# Patient Record
Sex: MALE | Race: WHITE | NOT HISPANIC OR LATINO | Employment: OTHER | URBAN - METROPOLITAN AREA
[De-identification: names, ages, dates, MRNs, and addresses within clinical notes are randomized per-mention and may not be internally consistent; named-entity substitution may affect disease eponyms.]

---

## 2021-06-25 ENCOUNTER — APPOINTMENT (OUTPATIENT)
Dept: GENERAL RADIOLOGY | Facility: HOSPITAL | Age: 46
End: 2021-06-25

## 2021-06-25 ENCOUNTER — APPOINTMENT (OUTPATIENT)
Dept: CT IMAGING | Facility: HOSPITAL | Age: 46
End: 2021-06-25

## 2021-06-25 ENCOUNTER — HOSPITAL ENCOUNTER (INPATIENT)
Facility: HOSPITAL | Age: 46
LOS: 6 days | Discharge: HOME | End: 2021-07-01
Attending: EMERGENCY MEDICINE | Admitting: INTERNAL MEDICINE

## 2021-06-25 DIAGNOSIS — D72.829 LEUKOCYTOSIS, UNSPECIFIED TYPE: ICD-10-CM

## 2021-06-25 DIAGNOSIS — N17.9 ACUTE KIDNEY INJURY (HCC): ICD-10-CM

## 2021-06-25 DIAGNOSIS — R41.82 ALTERED MENTAL STATUS, UNSPECIFIED ALTERED MENTAL STATUS TYPE: Primary | ICD-10-CM

## 2021-06-25 PROBLEM — R41.0 CONFUSION: Status: ACTIVE | Noted: 2021-06-25

## 2021-06-25 LAB
ALBUMIN SERPL-MCNC: 4.5 G/DL (ref 3.5–5.2)
ALBUMIN/GLOB SERPL: 1.5 G/DL
ALP SERPL-CCNC: 84 U/L (ref 39–117)
ALT SERPL W P-5'-P-CCNC: 21 U/L (ref 1–41)
AMPHET+METHAMPHET UR QL: NEGATIVE
ANION GAP SERPL CALCULATED.3IONS-SCNC: 22 MMOL/L (ref 5–15)
AST SERPL-CCNC: 18 U/L (ref 1–40)
BACTERIA UR QL AUTO: ABNORMAL /HPF
BARBITURATES UR QL SCN: NEGATIVE
BASOPHILS # BLD AUTO: 0.1 10*3/MM3 (ref 0–0.2)
BASOPHILS NFR BLD AUTO: 0.6 % (ref 0–1.5)
BENZODIAZ UR QL SCN: NEGATIVE
BILIRUB SERPL-MCNC: 0.5 MG/DL (ref 0–1.2)
BILIRUB UR QL STRIP: NEGATIVE
BUN SERPL-MCNC: 29 MG/DL (ref 6–20)
BUN/CREAT SERPL: 8.9 (ref 7–25)
CALCIUM SPEC-SCNC: 9.3 MG/DL (ref 8.6–10.5)
CANNABINOIDS SERPL QL: NEGATIVE
CHLORIDE SERPL-SCNC: 94 MMOL/L (ref 98–107)
CLARITY UR: CLEAR
CO2 SERPL-SCNC: 19 MMOL/L (ref 22–29)
COCAINE UR QL: NEGATIVE
COLOR UR: YELLOW
CREAT SERPL-MCNC: 3.26 MG/DL (ref 0.76–1.27)
DEPRECATED RDW RBC AUTO: 39.8 FL (ref 37–54)
EOSINOPHIL # BLD AUTO: 0 10*3/MM3 (ref 0–0.4)
EOSINOPHIL NFR BLD AUTO: 0.1 % (ref 0.3–6.2)
ERYTHROCYTE [DISTWIDTH] IN BLOOD BY AUTOMATED COUNT: 13.3 % (ref 12.3–15.4)
ETHANOL UR QL: <0.01 %
GFR SERPL CREATININE-BSD FRML MDRD: 21 ML/MIN/1.73
GLOBULIN UR ELPH-MCNC: 3.1 GM/DL
GLUCOSE SERPL-MCNC: 85 MG/DL (ref 65–99)
GLUCOSE UR STRIP-MCNC: NEGATIVE MG/DL
HCT VFR BLD AUTO: 44.4 % (ref 37.5–51)
HGB BLD-MCNC: 15.4 G/DL (ref 13–17.7)
HGB UR QL STRIP.AUTO: NEGATIVE
HYALINE CASTS UR QL AUTO: ABNORMAL /LPF
KETONES UR QL STRIP: ABNORMAL
LEUKOCYTE ESTERASE UR QL STRIP.AUTO: NEGATIVE
LYMPHOCYTES # BLD AUTO: 1.7 10*3/MM3 (ref 0.7–3.1)
LYMPHOCYTES NFR BLD AUTO: 8.4 % (ref 19.6–45.3)
MCH RBC QN AUTO: 29.8 PG (ref 26.6–33)
MCHC RBC AUTO-ENTMCNC: 34.6 G/DL (ref 31.5–35.7)
MCV RBC AUTO: 86.1 FL (ref 79–97)
METHADONE UR QL SCN: NEGATIVE
MONOCYTES # BLD AUTO: 1.5 10*3/MM3 (ref 0.1–0.9)
MONOCYTES NFR BLD AUTO: 7.3 % (ref 5–12)
MUCOUS THREADS URNS QL MICRO: ABNORMAL /HPF
NEUTROPHILS NFR BLD AUTO: 16.8 10*3/MM3 (ref 1.7–7)
NEUTROPHILS NFR BLD AUTO: 83.6 % (ref 42.7–76)
NITRITE UR QL STRIP: NEGATIVE
NRBC BLD AUTO-RTO: 0 /100 WBC (ref 0–0.2)
OPIATES UR QL: POSITIVE
OXYCODONE UR QL SCN: NEGATIVE
PH UR STRIP.AUTO: 5.5 [PH] (ref 5–8)
PLATELET # BLD AUTO: 261 10*3/MM3 (ref 140–450)
PMV BLD AUTO: 8.6 FL (ref 6–12)
POTASSIUM SERPL-SCNC: 3.9 MMOL/L (ref 3.5–5.2)
PROT SERPL-MCNC: 7.6 G/DL (ref 6–8.5)
PROT UR QL STRIP: ABNORMAL
RBC # BLD AUTO: 5.16 10*6/MM3 (ref 4.14–5.8)
RBC # UR: ABNORMAL /HPF
REF LAB TEST METHOD: ABNORMAL
SODIUM SERPL-SCNC: 135 MMOL/L (ref 136–145)
SP GR UR STRIP: 1.02 (ref 1–1.03)
SQUAMOUS #/AREA URNS HPF: ABNORMAL /HPF
TROPONIN T SERPL-MCNC: <0.01 NG/ML (ref 0–0.03)
UROBILINOGEN UR QL STRIP: ABNORMAL
WBC # BLD AUTO: 20.1 10*3/MM3 (ref 3.4–10.8)
WBC UR QL AUTO: ABNORMAL /HPF

## 2021-06-25 PROCEDURE — 80307 DRUG TEST PRSMV CHEM ANLYZR: CPT | Performed by: EMERGENCY MEDICINE

## 2021-06-25 PROCEDURE — 82077 ASSAY SPEC XCP UR&BREATH IA: CPT | Performed by: EMERGENCY MEDICINE

## 2021-06-25 PROCEDURE — 99285 EMERGENCY DEPT VISIT HI MDM: CPT

## 2021-06-25 PROCEDURE — 81001 URINALYSIS AUTO W/SCOPE: CPT | Performed by: EMERGENCY MEDICINE

## 2021-06-25 PROCEDURE — 99223 1ST HOSP IP/OBS HIGH 75: CPT | Performed by: INTERNAL MEDICINE

## 2021-06-25 PROCEDURE — 87086 URINE CULTURE/COLONY COUNT: CPT | Performed by: EMERGENCY MEDICINE

## 2021-06-25 PROCEDURE — 93005 ELECTROCARDIOGRAM TRACING: CPT | Performed by: EMERGENCY MEDICINE

## 2021-06-25 PROCEDURE — 71045 X-RAY EXAM CHEST 1 VIEW: CPT

## 2021-06-25 PROCEDURE — 85025 COMPLETE CBC W/AUTO DIFF WBC: CPT | Performed by: EMERGENCY MEDICINE

## 2021-06-25 PROCEDURE — 80053 COMPREHEN METABOLIC PANEL: CPT | Performed by: EMERGENCY MEDICINE

## 2021-06-25 PROCEDURE — 70450 CT HEAD/BRAIN W/O DYE: CPT

## 2021-06-25 PROCEDURE — 84484 ASSAY OF TROPONIN QUANT: CPT | Performed by: EMERGENCY MEDICINE

## 2021-06-25 RX ORDER — SODIUM CHLORIDE 0.9 % (FLUSH) 0.9 %
3-10 SYRINGE (ML) INJECTION AS NEEDED
Status: DISCONTINUED | OUTPATIENT
Start: 2021-06-25 | End: 2021-07-01 | Stop reason: HOSPADM

## 2021-06-25 RX ORDER — MORPHINE SULFATE 60 MG/1
30 TABLET, FILM COATED, EXTENDED RELEASE ORAL 3 TIMES DAILY
COMMUNITY
End: 2022-05-13

## 2021-06-25 RX ORDER — NORTRIPTYLINE HYDROCHLORIDE 25 MG/1
25 CAPSULE ORAL NIGHTLY
COMMUNITY
End: 2022-03-07

## 2021-06-25 RX ORDER — BISACODYL 5 MG/1
5 TABLET, DELAYED RELEASE ORAL DAILY PRN
Status: DISCONTINUED | OUTPATIENT
Start: 2021-06-25 | End: 2021-07-01 | Stop reason: HOSPADM

## 2021-06-25 RX ORDER — AMOXICILLIN 250 MG
2 CAPSULE ORAL 2 TIMES DAILY
Status: DISCONTINUED | OUTPATIENT
Start: 2021-06-25 | End: 2021-07-01 | Stop reason: HOSPADM

## 2021-06-25 RX ORDER — RENAGEL 800 MG/1
14 TABLET ORAL DAILY
COMMUNITY
End: 2022-03-07

## 2021-06-25 RX ORDER — POLYETHYLENE GLYCOL 3350 17 G/17G
17 POWDER, FOR SOLUTION ORAL DAILY PRN
Status: DISCONTINUED | OUTPATIENT
Start: 2021-06-25 | End: 2021-07-01 | Stop reason: HOSPADM

## 2021-06-25 RX ORDER — RIZATRIPTAN BENZOATE 10 MG/1
10 TABLET ORAL ONCE AS NEEDED
COMMUNITY

## 2021-06-25 RX ORDER — SODIUM CHLORIDE 0.9 % (FLUSH) 0.9 %
10 SYRINGE (ML) INJECTION AS NEEDED
Status: DISCONTINUED | OUTPATIENT
Start: 2021-06-25 | End: 2021-07-01 | Stop reason: HOSPADM

## 2021-06-25 RX ORDER — SODIUM CHLORIDE 9 MG/ML
125 INJECTION, SOLUTION INTRAVENOUS CONTINUOUS
Status: DISCONTINUED | OUTPATIENT
Start: 2021-06-25 | End: 2021-06-29

## 2021-06-25 RX ORDER — MELATONIN
2000 DAILY
COMMUNITY
End: 2021-06-26

## 2021-06-25 RX ORDER — PHENOL 1.4 %
600 AEROSOL, SPRAY (ML) MUCOUS MEMBRANE DAILY
COMMUNITY
End: 2021-06-26

## 2021-06-25 RX ORDER — BACLOFEN 20 MG/1
20 TABLET ORAL 3 TIMES DAILY
COMMUNITY
End: 2021-07-01 | Stop reason: HOSPADM

## 2021-06-25 RX ORDER — LISINOPRIL 10 MG/1
10 TABLET ORAL DAILY
COMMUNITY
End: 2021-07-01 | Stop reason: HOSPADM

## 2021-06-25 RX ORDER — AMLODIPINE BESYLATE 10 MG/1
10 TABLET ORAL DAILY
COMMUNITY

## 2021-06-25 RX ORDER — SODIUM CHLORIDE 0.9 % (FLUSH) 0.9 %
3 SYRINGE (ML) INJECTION EVERY 12 HOURS SCHEDULED
Status: DISCONTINUED | OUTPATIENT
Start: 2021-06-25 | End: 2021-07-01 | Stop reason: HOSPADM

## 2021-06-25 RX ORDER — MORPHINE SULFATE 15 MG/1
15 TABLET ORAL EVERY 6 HOURS PRN
COMMUNITY
End: 2022-05-13 | Stop reason: SDUPTHER

## 2021-06-25 RX ORDER — ONDANSETRON 2 MG/ML
4 INJECTION INTRAMUSCULAR; INTRAVENOUS EVERY 6 HOURS PRN
Status: DISCONTINUED | OUTPATIENT
Start: 2021-06-25 | End: 2021-06-28

## 2021-06-25 RX ORDER — BISACODYL 10 MG
10 SUPPOSITORY, RECTAL RECTAL DAILY PRN
Status: DISCONTINUED | OUTPATIENT
Start: 2021-06-25 | End: 2021-07-01 | Stop reason: HOSPADM

## 2021-06-25 RX ORDER — BETHANECHOL CHLORIDE 25 MG/1
25 TABLET ORAL 3 TIMES DAILY
COMMUNITY
End: 2023-01-02 | Stop reason: SDDI

## 2021-06-25 RX ORDER — ONDANSETRON 4 MG/1
4 TABLET, FILM COATED ORAL EVERY 6 HOURS PRN
Status: DISCONTINUED | OUTPATIENT
Start: 2021-06-25 | End: 2021-07-01 | Stop reason: HOSPADM

## 2021-06-25 RX ORDER — MECLIZINE HCL 12.5 MG/1
12.5 TABLET ORAL 3 TIMES DAILY PRN
COMMUNITY

## 2021-06-25 RX ORDER — PREGABALIN 225 MG/1
225 CAPSULE ORAL 2 TIMES DAILY
COMMUNITY
End: 2021-06-26

## 2021-06-25 RX ORDER — CYCLOBENZAPRINE HCL 10 MG
10 TABLET ORAL 3 TIMES DAILY PRN
COMMUNITY
End: 2021-06-26

## 2021-06-25 RX ORDER — ALUMINA, MAGNESIA, AND SIMETHICONE 2400; 2400; 240 MG/30ML; MG/30ML; MG/30ML
15 SUSPENSION ORAL EVERY 6 HOURS PRN
Status: DISCONTINUED | OUTPATIENT
Start: 2021-06-25 | End: 2021-07-01 | Stop reason: HOSPADM

## 2021-06-25 RX ADMIN — SODIUM CHLORIDE 1000 ML: 9 INJECTION, SOLUTION INTRAVENOUS at 22:22

## 2021-06-26 ENCOUNTER — APPOINTMENT (OUTPATIENT)
Dept: GENERAL RADIOLOGY | Facility: HOSPITAL | Age: 46
End: 2021-06-26

## 2021-06-26 ENCOUNTER — APPOINTMENT (OUTPATIENT)
Dept: ULTRASOUND IMAGING | Facility: HOSPITAL | Age: 46
End: 2021-06-26

## 2021-06-26 PROBLEM — R65.10 SIRS (SYSTEMIC INFLAMMATORY RESPONSE SYNDROME) (HCC): Status: ACTIVE | Noted: 2021-06-26

## 2021-06-26 PROBLEM — G93.41 ACUTE METABOLIC ENCEPHALOPATHY: Status: ACTIVE | Noted: 2021-06-26

## 2021-06-26 PROBLEM — F32.A DEPRESSION: Status: ACTIVE | Noted: 2021-06-26

## 2021-06-26 PROBLEM — G89.29 CHRONIC PAIN: Status: ACTIVE | Noted: 2021-06-26

## 2021-06-26 PROBLEM — N17.9 AKI (ACUTE KIDNEY INJURY): Status: ACTIVE | Noted: 2021-06-26

## 2021-06-26 LAB
AMMONIA BLD-SCNC: 21 UMOL/L (ref 16–60)
ANION GAP SERPL CALCULATED.3IONS-SCNC: 19 MMOL/L (ref 5–15)
BUN SERPL-MCNC: 28 MG/DL (ref 6–20)
BUN/CREAT SERPL: 10.9 (ref 7–25)
CALCIUM SPEC-SCNC: 8.8 MG/DL (ref 8.6–10.5)
CHLORIDE SERPL-SCNC: 95 MMOL/L (ref 98–107)
CK SERPL-CCNC: 379 U/L (ref 20–200)
CO2 SERPL-SCNC: 21 MMOL/L (ref 22–29)
CREAT SERPL-MCNC: 2.58 MG/DL (ref 0.76–1.27)
CRP SERPL-MCNC: 1.92 MG/DL (ref 0–0.5)
D-LACTATE SERPL-SCNC: 2 MMOL/L (ref 0.5–2)
D-LACTATE SERPL-SCNC: 3.4 MMOL/L (ref 0.5–2)
DEPRECATED RDW RBC AUTO: 40.7 FL (ref 37–54)
EOSINOPHIL # BLD MANUAL: 0.18 10*3/MM3 (ref 0–0.4)
EOSINOPHIL NFR BLD MANUAL: 1 % (ref 0.3–6.2)
ERYTHROCYTE [DISTWIDTH] IN BLOOD BY AUTOMATED COUNT: 13.4 % (ref 12.3–15.4)
ERYTHROCYTE [SEDIMENTATION RATE] IN BLOOD: 25 MM/HR (ref 0–15)
GFR SERPL CREATININE-BSD FRML MDRD: 27 ML/MIN/1.73
GLUCOSE SERPL-MCNC: 99 MG/DL (ref 65–99)
HCT VFR BLD AUTO: 43.3 % (ref 37.5–51)
HGB BLD-MCNC: 14.6 G/DL (ref 13–17.7)
LYMPHOCYTES # BLD MANUAL: 1.1 10*3/MM3 (ref 0.7–3.1)
LYMPHOCYTES NFR BLD MANUAL: 3 % (ref 5–12)
LYMPHOCYTES NFR BLD MANUAL: 6 % (ref 19.6–45.3)
MCH RBC QN AUTO: 29.1 PG (ref 26.6–33)
MCHC RBC AUTO-ENTMCNC: 33.7 G/DL (ref 31.5–35.7)
MCV RBC AUTO: 86.4 FL (ref 79–97)
MONOCYTES # BLD AUTO: 0.55 10*3/MM3 (ref 0.1–0.9)
NEUTROPHILS # BLD AUTO: 16.56 10*3/MM3 (ref 1.7–7)
NEUTROPHILS NFR BLD MANUAL: 83 % (ref 42.7–76)
NEUTS BAND NFR BLD MANUAL: 7 % (ref 0–5)
NEUTS VAC BLD QL SMEAR: ABNORMAL
PLAT MORPH BLD: NORMAL
PLATELET # BLD AUTO: 258 10*3/MM3 (ref 140–450)
PMV BLD AUTO: 8.7 FL (ref 6–12)
POTASSIUM SERPL-SCNC: 3.4 MMOL/L (ref 3.5–5.2)
QT INTERVAL: 296 MS
RBC # BLD AUTO: 5.01 10*6/MM3 (ref 4.14–5.8)
RBC MORPH BLD: NORMAL
SARS-COV-2 RNA PNL SPEC NAA+PROBE: NOT DETECTED
SODIUM SERPL-SCNC: 135 MMOL/L (ref 136–145)
URATE SERPL-MCNC: 9.2 MG/DL (ref 3.4–7)
VIT B12 BLD-MCNC: 413 PG/ML (ref 211–946)
WBC # BLD AUTO: 18.4 10*3/MM3 (ref 3.4–10.8)

## 2021-06-26 PROCEDURE — 82140 ASSAY OF AMMONIA: CPT | Performed by: HOSPITALIST

## 2021-06-26 PROCEDURE — 80048 BASIC METABOLIC PNL TOTAL CA: CPT | Performed by: INTERNAL MEDICINE

## 2021-06-26 PROCEDURE — 99222 1ST HOSP IP/OBS MODERATE 55: CPT | Performed by: PSYCHIATRY & NEUROLOGY

## 2021-06-26 PROCEDURE — 25010000002 ONDANSETRON PER 1 MG: Performed by: INTERNAL MEDICINE

## 2021-06-26 PROCEDURE — 85652 RBC SED RATE AUTOMATED: CPT | Performed by: HOSPITALIST

## 2021-06-26 PROCEDURE — 99232 SBSQ HOSP IP/OBS MODERATE 35: CPT | Performed by: HOSPITALIST

## 2021-06-26 PROCEDURE — 87635 SARS-COV-2 COVID-19 AMP PRB: CPT | Performed by: EMERGENCY MEDICINE

## 2021-06-26 PROCEDURE — 85007 BL SMEAR W/DIFF WBC COUNT: CPT | Performed by: INTERNAL MEDICINE

## 2021-06-26 PROCEDURE — 84550 ASSAY OF BLOOD/URIC ACID: CPT | Performed by: HOSPITALIST

## 2021-06-26 PROCEDURE — 82550 ASSAY OF CK (CPK): CPT | Performed by: HOSPITALIST

## 2021-06-26 PROCEDURE — 86140 C-REACTIVE PROTEIN: CPT | Performed by: HOSPITALIST

## 2021-06-26 PROCEDURE — G0378 HOSPITAL OBSERVATION PER HR: HCPCS

## 2021-06-26 PROCEDURE — 85027 COMPLETE CBC AUTOMATED: CPT | Performed by: INTERNAL MEDICINE

## 2021-06-26 PROCEDURE — 25010000002 CEFTRIAXONE PER 250 MG: Performed by: HOSPITALIST

## 2021-06-26 PROCEDURE — 76775 US EXAM ABDO BACK WALL LIM: CPT

## 2021-06-26 PROCEDURE — 82607 VITAMIN B-12: CPT | Performed by: HOSPITALIST

## 2021-06-26 PROCEDURE — 83605 ASSAY OF LACTIC ACID: CPT | Performed by: HOSPITALIST

## 2021-06-26 PROCEDURE — 87040 BLOOD CULTURE FOR BACTERIA: CPT | Performed by: HOSPITALIST

## 2021-06-26 PROCEDURE — 36415 COLL VENOUS BLD VENIPUNCTURE: CPT | Performed by: INTERNAL MEDICINE

## 2021-06-26 PROCEDURE — 74018 RADEX ABDOMEN 1 VIEW: CPT

## 2021-06-26 RX ORDER — AMLODIPINE BESYLATE 5 MG/1
10 TABLET ORAL DAILY
Status: DISCONTINUED | OUTPATIENT
Start: 2021-06-26 | End: 2021-07-01 | Stop reason: HOSPADM

## 2021-06-26 RX ORDER — PREGABALIN 150 MG/1
150 CAPSULE ORAL 3 TIMES DAILY
COMMUNITY
End: 2022-05-13 | Stop reason: SDUPTHER

## 2021-06-26 RX ORDER — SUMATRIPTAN 25 MG/1
25 TABLET, FILM COATED ORAL
Status: COMPLETED | OUTPATIENT
Start: 2021-06-26 | End: 2021-06-30

## 2021-06-26 RX ORDER — LORAZEPAM 2 MG/ML
INJECTION INTRAMUSCULAR
Status: COMPLETED
Start: 2021-06-26 | End: 2021-06-27

## 2021-06-26 RX ORDER — NORTRIPTYLINE HYDROCHLORIDE 25 MG/1
25 CAPSULE ORAL NIGHTLY
Status: DISCONTINUED | OUTPATIENT
Start: 2021-06-26 | End: 2021-07-01 | Stop reason: HOSPADM

## 2021-06-26 RX ORDER — PREGABALIN 100 MG/1
100 CAPSULE ORAL EVERY 8 HOURS SCHEDULED
Status: DISCONTINUED | OUTPATIENT
Start: 2021-06-26 | End: 2021-07-01 | Stop reason: HOSPADM

## 2021-06-26 RX ORDER — MORPHINE SULFATE 15 MG/1
15 TABLET ORAL EVERY 6 HOURS PRN
Status: DISCONTINUED | OUTPATIENT
Start: 2021-06-26 | End: 2021-07-01 | Stop reason: HOSPADM

## 2021-06-26 RX ORDER — LORAZEPAM 2 MG/ML
0.5 INJECTION INTRAMUSCULAR ONCE
Status: COMPLETED | OUTPATIENT
Start: 2021-06-27 | End: 2021-06-27

## 2021-06-26 RX ORDER — CHOLECALCIFEROL (VITAMIN D3) 125 MCG
5 CAPSULE ORAL NIGHTLY PRN
Status: DISCONTINUED | OUTPATIENT
Start: 2021-06-26 | End: 2021-07-01 | Stop reason: HOSPADM

## 2021-06-26 RX ORDER — MORPHINE SULFATE 15 MG/1
60 TABLET, FILM COATED, EXTENDED RELEASE ORAL 2 TIMES DAILY
Status: DISCONTINUED | OUTPATIENT
Start: 2021-06-26 | End: 2021-07-01 | Stop reason: HOSPADM

## 2021-06-26 RX ORDER — SUMATRIPTAN 50 MG/1
25 TABLET, FILM COATED ORAL ONCE AS NEEDED
Status: COMPLETED | OUTPATIENT
Start: 2021-06-26 | End: 2021-06-26

## 2021-06-26 RX ORDER — MECLIZINE HYDROCHLORIDE 25 MG/1
12.5 TABLET ORAL 3 TIMES DAILY PRN
Status: DISCONTINUED | OUTPATIENT
Start: 2021-06-26 | End: 2021-07-01 | Stop reason: HOSPADM

## 2021-06-26 RX ADMIN — MORPHINE SULFATE 60 MG: 15 TABLET, FILM COATED, EXTENDED RELEASE ORAL at 02:59

## 2021-06-26 RX ADMIN — PREGABALIN 100 MG: 100 CAPSULE ORAL at 21:00

## 2021-06-26 RX ADMIN — SODIUM CHLORIDE 125 ML/HR: 9 INJECTION, SOLUTION INTRAVENOUS at 20:36

## 2021-06-26 RX ADMIN — PREGABALIN 100 MG: 100 CAPSULE ORAL at 08:38

## 2021-06-26 RX ADMIN — DOCUSATE SODIUM 50 MG AND SENNOSIDES 8.6 MG 2 TABLET: 8.6; 5 TABLET, FILM COATED ORAL at 20:34

## 2021-06-26 RX ADMIN — MORPHINE SULFATE 15 MG: 15 TABLET ORAL at 08:07

## 2021-06-26 RX ADMIN — MORPHINE SULFATE 60 MG: 15 TABLET, FILM COATED, EXTENDED RELEASE ORAL at 15:25

## 2021-06-26 RX ADMIN — NORTRIPTYLINE HYDROCHLORIDE 25 MG: 25 CAPSULE ORAL at 06:52

## 2021-06-26 RX ADMIN — Medication 5 MG: at 21:00

## 2021-06-26 RX ADMIN — AMLODIPINE BESYLATE 10 MG: 5 TABLET ORAL at 08:07

## 2021-06-26 RX ADMIN — BISACODYL 5 MG: 5 TABLET, COATED ORAL at 02:22

## 2021-06-26 RX ADMIN — Medication 3 ML: at 20:37

## 2021-06-26 RX ADMIN — DOCUSATE SODIUM 50 MG AND SENNOSIDES 8.6 MG 2 TABLET: 8.6; 5 TABLET, FILM COATED ORAL at 11:17

## 2021-06-26 RX ADMIN — ONDANSETRON 4 MG: 2 INJECTION INTRAMUSCULAR; INTRAVENOUS at 02:24

## 2021-06-26 RX ADMIN — SUMATRIPTAN SUCCINATE 25 MG: 50 TABLET ORAL at 05:48

## 2021-06-26 RX ADMIN — NORTRIPTYLINE HYDROCHLORIDE 25 MG: 25 CAPSULE ORAL at 20:37

## 2021-06-26 RX ADMIN — PREGABALIN 100 MG: 100 CAPSULE ORAL at 15:25

## 2021-06-26 RX ADMIN — SODIUM CHLORIDE 125 ML/HR: 9 INJECTION, SOLUTION INTRAVENOUS at 20:34

## 2021-06-26 RX ADMIN — SODIUM CHLORIDE 125 ML/HR: 9 INJECTION, SOLUTION INTRAVENOUS at 02:25

## 2021-06-26 RX ADMIN — WATER 1 G: 1000 INJECTION, SOLUTION INTRAVENOUS at 08:07

## 2021-06-26 RX ADMIN — MECLIZINE HYDROCHLORIDE 12.5 MG: 25 TABLET ORAL at 20:34

## 2021-06-27 PROBLEM — R41.82 ALTERED MENTAL STATUS: Status: ACTIVE | Noted: 2021-06-27

## 2021-06-27 LAB
ALBUMIN SERPL-MCNC: 3.9 G/DL (ref 3.5–5.2)
ALBUMIN/GLOB SERPL: 1.6 G/DL
ALP SERPL-CCNC: 63 U/L (ref 39–117)
ALT SERPL W P-5'-P-CCNC: 15 U/L (ref 1–41)
ANION GAP SERPL CALCULATED.3IONS-SCNC: 15 MMOL/L (ref 5–15)
AST SERPL-CCNC: 20 U/L (ref 1–40)
BACTERIA SPEC AEROBE CULT: NO GROWTH
BASOPHILS # BLD AUTO: 0.1 10*3/MM3 (ref 0–0.2)
BASOPHILS NFR BLD AUTO: 0.5 % (ref 0–1.5)
BILIRUB SERPL-MCNC: 0.4 MG/DL (ref 0–1.2)
BUN SERPL-MCNC: 24 MG/DL (ref 6–20)
BUN/CREAT SERPL: 13 (ref 7–25)
CALCIUM SPEC-SCNC: 7.9 MG/DL (ref 8.6–10.5)
CHLORIDE SERPL-SCNC: 98 MMOL/L (ref 98–107)
CO2 SERPL-SCNC: 21 MMOL/L (ref 22–29)
CREAT SERPL-MCNC: 1.85 MG/DL (ref 0.76–1.27)
CRP SERPL-MCNC: 1.99 MG/DL (ref 0–0.5)
D-LACTATE SERPL-SCNC: 3.5 MMOL/L (ref 0.5–2)
DEPRECATED RDW RBC AUTO: 40.7 FL (ref 37–54)
EOSINOPHIL # BLD AUTO: 0.1 10*3/MM3 (ref 0–0.4)
EOSINOPHIL NFR BLD AUTO: 0.6 % (ref 0.3–6.2)
ERYTHROCYTE [DISTWIDTH] IN BLOOD BY AUTOMATED COUNT: 13.4 % (ref 12.3–15.4)
ERYTHROCYTE [SEDIMENTATION RATE] IN BLOOD: 18 MM/HR (ref 0–15)
GFR SERPL CREATININE-BSD FRML MDRD: 40 ML/MIN/1.73
GLOBULIN UR ELPH-MCNC: 2.5 GM/DL
GLUCOSE BLDC GLUCOMTR-MCNC: 95 MG/DL (ref 70–105)
GLUCOSE SERPL-MCNC: 98 MG/DL (ref 65–99)
HCT VFR BLD AUTO: 37.1 % (ref 37.5–51)
HGB BLD-MCNC: 12.4 G/DL (ref 13–17.7)
LYMPHOCYTES # BLD AUTO: 1.8 10*3/MM3 (ref 0.7–3.1)
LYMPHOCYTES NFR BLD AUTO: 11.5 % (ref 19.6–45.3)
MAGNESIUM SERPL-MCNC: 1.7 MG/DL (ref 1.6–2.6)
MCH RBC QN AUTO: 29.1 PG (ref 26.6–33)
MCHC RBC AUTO-ENTMCNC: 33.5 G/DL (ref 31.5–35.7)
MCV RBC AUTO: 86.9 FL (ref 79–97)
MONOCYTES # BLD AUTO: 1.7 10*3/MM3 (ref 0.1–0.9)
MONOCYTES NFR BLD AUTO: 10.9 % (ref 5–12)
NEUTROPHILS NFR BLD AUTO: 12.2 10*3/MM3 (ref 1.7–7)
NEUTROPHILS NFR BLD AUTO: 76.5 % (ref 42.7–76)
NRBC BLD AUTO-RTO: 0.1 /100 WBC (ref 0–0.2)
PLATELET # BLD AUTO: 199 10*3/MM3 (ref 140–450)
PMV BLD AUTO: 9.3 FL (ref 6–12)
POTASSIUM SERPL-SCNC: 3.6 MMOL/L (ref 3.5–5.2)
PROCALCITONIN SERPL-MCNC: 0.38 NG/ML (ref 0–0.25)
PROT SERPL-MCNC: 6.4 G/DL (ref 6–8.5)
RBC # BLD AUTO: 4.27 10*6/MM3 (ref 4.14–5.8)
SODIUM SERPL-SCNC: 134 MMOL/L (ref 136–145)
WBC # BLD AUTO: 15.9 10*3/MM3 (ref 3.4–10.8)

## 2021-06-27 PROCEDURE — 25010000002 VANCOMYCIN PER 500 MG: Performed by: HOSPITALIST

## 2021-06-27 PROCEDURE — 82962 GLUCOSE BLOOD TEST: CPT

## 2021-06-27 PROCEDURE — 80053 COMPREHEN METABOLIC PANEL: CPT | Performed by: INTERNAL MEDICINE

## 2021-06-27 PROCEDURE — 25010000002 HALOPERIDOL LACTATE PER 5 MG: Performed by: INTERNAL MEDICINE

## 2021-06-27 PROCEDURE — 99232 SBSQ HOSP IP/OBS MODERATE 35: CPT | Performed by: HOSPITALIST

## 2021-06-27 PROCEDURE — 94799 UNLISTED PULMONARY SVC/PX: CPT

## 2021-06-27 PROCEDURE — 83735 ASSAY OF MAGNESIUM: CPT | Performed by: HOSPITALIST

## 2021-06-27 PROCEDURE — 25010000002 VANCOMYCIN 10 G RECONSTITUTED SOLUTION: Performed by: HOSPITALIST

## 2021-06-27 PROCEDURE — 84145 PROCALCITONIN (PCT): CPT | Performed by: HOSPITALIST

## 2021-06-27 PROCEDURE — 85652 RBC SED RATE AUTOMATED: CPT | Performed by: INTERNAL MEDICINE

## 2021-06-27 PROCEDURE — 85025 COMPLETE CBC W/AUTO DIFF WBC: CPT | Performed by: HOSPITALIST

## 2021-06-27 PROCEDURE — 99232 SBSQ HOSP IP/OBS MODERATE 35: CPT | Performed by: PSYCHIATRY & NEUROLOGY

## 2021-06-27 PROCEDURE — 25010000002 CEFTRIAXONE PER 250 MG: Performed by: HOSPITALIST

## 2021-06-27 PROCEDURE — 99222 1ST HOSP IP/OBS MODERATE 55: CPT | Performed by: PSYCHIATRY & NEUROLOGY

## 2021-06-27 PROCEDURE — 25010000002 LORAZEPAM PER 2 MG: Performed by: HOSPITALIST

## 2021-06-27 PROCEDURE — 83605 ASSAY OF LACTIC ACID: CPT | Performed by: INTERNAL MEDICINE

## 2021-06-27 PROCEDURE — 25010000002 LORAZEPAM PER 2 MG

## 2021-06-27 PROCEDURE — 86140 C-REACTIVE PROTEIN: CPT | Performed by: HOSPITALIST

## 2021-06-27 PROCEDURE — 25010000002 LORAZEPAM PER 2 MG: Performed by: INTERNAL MEDICINE

## 2021-06-27 RX ORDER — LORAZEPAM 2 MG/ML
1 INJECTION INTRAMUSCULAR EVERY 4 HOURS PRN
Status: DISCONTINUED | OUTPATIENT
Start: 2021-06-27 | End: 2021-06-28

## 2021-06-27 RX ORDER — LORAZEPAM 2 MG/ML
2 INJECTION INTRAMUSCULAR
Status: DISCONTINUED | OUTPATIENT
Start: 2021-06-27 | End: 2021-06-27

## 2021-06-27 RX ORDER — LORAZEPAM 2 MG/ML
2 INJECTION INTRAMUSCULAR ONCE
Status: COMPLETED | OUTPATIENT
Start: 2021-06-27 | End: 2021-06-27

## 2021-06-27 RX ORDER — LORAZEPAM 2 MG/ML
1 INJECTION INTRAMUSCULAR ONCE
Status: COMPLETED | OUTPATIENT
Start: 2021-06-27 | End: 2021-06-27

## 2021-06-27 RX ORDER — VANCOMYCIN 1.75 GRAM/500 ML IN 0.9 % SODIUM CHLORIDE INTRAVENOUS
20 ONCE
Status: COMPLETED | OUTPATIENT
Start: 2021-06-27 | End: 2021-06-27

## 2021-06-27 RX ORDER — HALOPERIDOL 5 MG/ML
5 INJECTION INTRAMUSCULAR ONCE
Status: COMPLETED | OUTPATIENT
Start: 2021-06-27 | End: 2021-06-27

## 2021-06-27 RX ORDER — OLANZAPINE 10 MG/2ML
5 INJECTION, POWDER, LYOPHILIZED, FOR SOLUTION INTRAMUSCULAR 2 TIMES DAILY PRN
Status: DISCONTINUED | OUTPATIENT
Start: 2021-06-27 | End: 2021-06-28

## 2021-06-27 RX ORDER — LORAZEPAM 2 MG/ML
INJECTION INTRAMUSCULAR
Status: COMPLETED
Start: 2021-06-27 | End: 2021-06-27

## 2021-06-27 RX ORDER — LORAZEPAM 2 MG/ML
INJECTION INTRAMUSCULAR
Status: DISPENSED
Start: 2021-06-27 | End: 2021-06-27

## 2021-06-27 RX ADMIN — WATER 1 G: 1000 INJECTION, SOLUTION INTRAVENOUS at 12:24

## 2021-06-27 RX ADMIN — LORAZEPAM 2 MG: 2 INJECTION INTRAMUSCULAR; INTRAVENOUS at 01:29

## 2021-06-27 RX ADMIN — LORAZEPAM 0.5 MG: 2 INJECTION INTRAMUSCULAR at 00:07

## 2021-06-27 RX ADMIN — PREGABALIN 100 MG: 100 CAPSULE ORAL at 14:13

## 2021-06-27 RX ADMIN — HALOPERIDOL LACTATE 5 MG: 5 INJECTION INTRAMUSCULAR at 00:30

## 2021-06-27 RX ADMIN — WATER 1 G: 1000 INJECTION, SOLUTION INTRAVENOUS at 08:35

## 2021-06-27 RX ADMIN — LORAZEPAM 2 MG: 2 INJECTION INTRAMUSCULAR; INTRAVENOUS at 02:41

## 2021-06-27 RX ADMIN — WATER 5 MG: 1 INJECTION INTRAMUSCULAR; INTRAVENOUS; SUBCUTANEOUS at 14:07

## 2021-06-27 RX ADMIN — LORAZEPAM 2 MG: 2 INJECTION INTRAMUSCULAR; INTRAVENOUS at 12:24

## 2021-06-27 RX ADMIN — VANCOMYCIN HYDROCHLORIDE 1750 MG: 500 INJECTION, POWDER, LYOPHILIZED, FOR SOLUTION INTRAVENOUS at 17:46

## 2021-06-27 RX ADMIN — NORTRIPTYLINE HYDROCHLORIDE 25 MG: 25 CAPSULE ORAL at 21:34

## 2021-06-27 RX ADMIN — CEFTRIAXONE SODIUM 2 G: 2 INJECTION, POWDER, FOR SOLUTION INTRAMUSCULAR; INTRAVENOUS at 20:16

## 2021-06-27 RX ADMIN — SODIUM CHLORIDE 125 ML/HR: 9 INJECTION, SOLUTION INTRAVENOUS at 14:11

## 2021-06-27 RX ADMIN — LORAZEPAM 0.5 MG: 2 INJECTION INTRAMUSCULAR; INTRAVENOUS at 00:07

## 2021-06-27 RX ADMIN — Medication 3 ML: at 08:58

## 2021-06-27 RX ADMIN — MORPHINE SULFATE 60 MG: 15 TABLET, FILM COATED, EXTENDED RELEASE ORAL at 14:13

## 2021-06-27 RX ADMIN — LORAZEPAM 2 MG: 2 INJECTION INTRAMUSCULAR at 01:29

## 2021-06-27 RX ADMIN — LORAZEPAM 1 MG: 2 INJECTION INTRAMUSCULAR; INTRAVENOUS at 17:56

## 2021-06-27 RX ADMIN — SODIUM CHLORIDE 125 ML/HR: 9 INJECTION, SOLUTION INTRAVENOUS at 06:12

## 2021-06-27 RX ADMIN — Medication 3 ML: at 21:34

## 2021-06-27 RX ADMIN — DOCUSATE SODIUM 50 MG AND SENNOSIDES 8.6 MG 2 TABLET: 8.6; 5 TABLET, FILM COATED ORAL at 21:34

## 2021-06-27 RX ADMIN — PREGABALIN 100 MG: 100 CAPSULE ORAL at 21:34

## 2021-06-28 LAB
ANION GAP SERPL CALCULATED.3IONS-SCNC: 12 MMOL/L (ref 5–15)
BASOPHILS # BLD AUTO: 0 10*3/MM3 (ref 0–0.2)
BASOPHILS NFR BLD AUTO: 0.6 % (ref 0–1.5)
BUN SERPL-MCNC: 12 MG/DL (ref 6–20)
BUN/CREAT SERPL: 11.2 (ref 7–25)
CALCIUM SPEC-SCNC: 7.6 MG/DL (ref 8.6–10.5)
CHLORIDE SERPL-SCNC: 105 MMOL/L (ref 98–107)
CO2 SERPL-SCNC: 23 MMOL/L (ref 22–29)
CREAT SERPL-MCNC: 1.07 MG/DL (ref 0.76–1.27)
CRP SERPL-MCNC: 1.76 MG/DL (ref 0–0.5)
DEPRECATED RDW RBC AUTO: 41.6 FL (ref 37–54)
EOSINOPHIL # BLD AUTO: 0.1 10*3/MM3 (ref 0–0.4)
EOSINOPHIL NFR BLD AUTO: 1.7 % (ref 0.3–6.2)
ERYTHROCYTE [DISTWIDTH] IN BLOOD BY AUTOMATED COUNT: 13.5 % (ref 12.3–15.4)
GFR SERPL CREATININE-BSD FRML MDRD: 75 ML/MIN/1.73
GLUCOSE SERPL-MCNC: 88 MG/DL (ref 65–99)
HCT VFR BLD AUTO: 35.2 % (ref 37.5–51)
HGB BLD-MCNC: 11.8 G/DL (ref 13–17.7)
LYMPHOCYTES # BLD AUTO: 1.4 10*3/MM3 (ref 0.7–3.1)
LYMPHOCYTES NFR BLD AUTO: 17 % (ref 19.6–45.3)
MAGNESIUM SERPL-MCNC: 2 MG/DL (ref 1.6–2.6)
MCH RBC QN AUTO: 29.2 PG (ref 26.6–33)
MCHC RBC AUTO-ENTMCNC: 33.4 G/DL (ref 31.5–35.7)
MCV RBC AUTO: 87.5 FL (ref 79–97)
MONOCYTES # BLD AUTO: 1 10*3/MM3 (ref 0.1–0.9)
MONOCYTES NFR BLD AUTO: 11.8 % (ref 5–12)
NEUTROPHILS NFR BLD AUTO: 5.6 10*3/MM3 (ref 1.7–7)
NEUTROPHILS NFR BLD AUTO: 68.9 % (ref 42.7–76)
NRBC BLD AUTO-RTO: 0.1 /100 WBC (ref 0–0.2)
PLATELET # BLD AUTO: 161 10*3/MM3 (ref 140–450)
PMV BLD AUTO: 8.2 FL (ref 6–12)
POTASSIUM SERPL-SCNC: 3.4 MMOL/L (ref 3.5–5.2)
RBC # BLD AUTO: 4.03 10*6/MM3 (ref 4.14–5.8)
SODIUM SERPL-SCNC: 140 MMOL/L (ref 136–145)
VANCOMYCIN SERPL-MCNC: 13.4 MCG/ML (ref 5–40)
VANCOMYCIN SERPL-MCNC: 14.7 MCG/ML (ref 5–40)
WBC # BLD AUTO: 8.1 10*3/MM3 (ref 3.4–10.8)

## 2021-06-28 PROCEDURE — 25010000002 ZIPRASIDONE MESYLATE PER 10 MG: Performed by: PHYSICIAN ASSISTANT

## 2021-06-28 PROCEDURE — 25010000002 VANCOMYCIN 10 G RECONSTITUTED SOLUTION: Performed by: HOSPITALIST

## 2021-06-28 PROCEDURE — 25010000002 CEFTRIAXONE PER 250 MG: Performed by: HOSPITALIST

## 2021-06-28 PROCEDURE — 85025 COMPLETE CBC W/AUTO DIFF WBC: CPT | Performed by: HOSPITALIST

## 2021-06-28 PROCEDURE — 99232 SBSQ HOSP IP/OBS MODERATE 35: CPT | Performed by: NURSE PRACTITIONER

## 2021-06-28 PROCEDURE — 99232 SBSQ HOSP IP/OBS MODERATE 35: CPT | Performed by: PHYSICIAN ASSISTANT

## 2021-06-28 PROCEDURE — 25010000002 LORAZEPAM PER 2 MG: Performed by: INTERNAL MEDICINE

## 2021-06-28 PROCEDURE — 83735 ASSAY OF MAGNESIUM: CPT | Performed by: HOSPITALIST

## 2021-06-28 PROCEDURE — 80048 BASIC METABOLIC PNL TOTAL CA: CPT | Performed by: HOSPITALIST

## 2021-06-28 PROCEDURE — 80202 ASSAY OF VANCOMYCIN: CPT | Performed by: HOSPITALIST

## 2021-06-28 PROCEDURE — 25010000002 VANCOMYCIN PER 500 MG: Performed by: HOSPITALIST

## 2021-06-28 PROCEDURE — 86140 C-REACTIVE PROTEIN: CPT | Performed by: HOSPITALIST

## 2021-06-28 PROCEDURE — 25010000002 LORAZEPAM PER 2 MG: Performed by: HOSPITALIST

## 2021-06-28 PROCEDURE — 99233 SBSQ HOSP IP/OBS HIGH 50: CPT | Performed by: INTERNAL MEDICINE

## 2021-06-28 RX ORDER — LORAZEPAM 2 MG/ML
1 INJECTION INTRAMUSCULAR
Status: DISCONTINUED | OUTPATIENT
Start: 2021-06-28 | End: 2021-07-01 | Stop reason: HOSPADM

## 2021-06-28 RX ORDER — OLANZAPINE 10 MG/2ML
2.5 INJECTION, POWDER, LYOPHILIZED, FOR SOLUTION INTRAMUSCULAR ONCE
Status: COMPLETED | OUTPATIENT
Start: 2021-06-28 | End: 2021-06-28

## 2021-06-28 RX ORDER — POTASSIUM CHLORIDE 20 MEQ/1
40 TABLET, EXTENDED RELEASE ORAL EVERY 4 HOURS
Status: COMPLETED | OUTPATIENT
Start: 2021-06-28 | End: 2021-06-28

## 2021-06-28 RX ADMIN — POTASSIUM CHLORIDE 40 MEQ: 1500 TABLET, EXTENDED RELEASE ORAL at 08:57

## 2021-06-28 RX ADMIN — CEFTRIAXONE SODIUM 2 G: 2 INJECTION, POWDER, FOR SOLUTION INTRAMUSCULAR; INTRAVENOUS at 16:23

## 2021-06-28 RX ADMIN — LORAZEPAM 1 MG: 2 INJECTION INTRAMUSCULAR; INTRAVENOUS at 23:35

## 2021-06-28 RX ADMIN — DOCUSATE SODIUM 50 MG AND SENNOSIDES 8.6 MG 2 TABLET: 8.6; 5 TABLET, FILM COATED ORAL at 08:56

## 2021-06-28 RX ADMIN — VANCOMYCIN HYDROCHLORIDE 1500 MG: 500 INJECTION, POWDER, LYOPHILIZED, FOR SOLUTION INTRAVENOUS at 10:06

## 2021-06-28 RX ADMIN — AMLODIPINE BESYLATE 10 MG: 5 TABLET ORAL at 08:57

## 2021-06-28 RX ADMIN — CEFTRIAXONE SODIUM 2 G: 2 INJECTION, POWDER, FOR SOLUTION INTRAMUSCULAR; INTRAVENOUS at 05:55

## 2021-06-28 RX ADMIN — MORPHINE SULFATE 60 MG: 15 TABLET, FILM COATED, EXTENDED RELEASE ORAL at 15:08

## 2021-06-28 RX ADMIN — NORTRIPTYLINE HYDROCHLORIDE 25 MG: 25 CAPSULE ORAL at 21:33

## 2021-06-28 RX ADMIN — PREGABALIN 100 MG: 100 CAPSULE ORAL at 13:41

## 2021-06-28 RX ADMIN — POTASSIUM CHLORIDE 40 MEQ: 1500 TABLET, EXTENDED RELEASE ORAL at 13:41

## 2021-06-28 RX ADMIN — TERIFLUNOMIDE 14 MG: 14 TABLET, FILM COATED ORAL at 08:57

## 2021-06-28 RX ADMIN — LORAZEPAM 1 MG: 2 INJECTION INTRAMUSCULAR; INTRAVENOUS at 18:01

## 2021-06-28 RX ADMIN — WATER 20 MG: 1 INJECTION INTRAMUSCULAR; INTRAVENOUS; SUBCUTANEOUS at 11:45

## 2021-06-28 RX ADMIN — VANCOMYCIN HYDROCHLORIDE 1250 MG: 100 INJECTION, POWDER, LYOPHILIZED, FOR SOLUTION INTRAVENOUS at 23:35

## 2021-06-28 RX ADMIN — MORPHINE SULFATE 60 MG: 15 TABLET, FILM COATED, EXTENDED RELEASE ORAL at 02:52

## 2021-06-28 RX ADMIN — PREGABALIN 100 MG: 100 CAPSULE ORAL at 05:55

## 2021-06-28 RX ADMIN — LORAZEPAM 1 MG: 2 INJECTION INTRAMUSCULAR; INTRAVENOUS at 08:57

## 2021-06-28 RX ADMIN — WATER 20 MG: 1 INJECTION INTRAMUSCULAR; INTRAVENOUS; SUBCUTANEOUS at 16:23

## 2021-06-28 RX ADMIN — PREGABALIN 100 MG: 100 CAPSULE ORAL at 21:33

## 2021-06-28 RX ADMIN — WATER 5 MG: 1 INJECTION INTRAMUSCULAR; INTRAVENOUS; SUBCUTANEOUS at 07:38

## 2021-06-28 RX ADMIN — Medication 3 ML: at 08:57

## 2021-06-28 RX ADMIN — SODIUM CHLORIDE 125 ML/HR: 9 INJECTION, SOLUTION INTRAVENOUS at 13:41

## 2021-06-28 RX ADMIN — Medication 3 ML: at 21:34

## 2021-06-28 RX ADMIN — WATER 2.5 MG: 1 INJECTION INTRAMUSCULAR; INTRAVENOUS; SUBCUTANEOUS at 10:06

## 2021-06-29 ENCOUNTER — APPOINTMENT (OUTPATIENT)
Dept: MRI IMAGING | Facility: HOSPITAL | Age: 46
End: 2021-06-29

## 2021-06-29 LAB
ANION GAP SERPL CALCULATED.3IONS-SCNC: 13 MMOL/L (ref 5–15)
BUN SERPL-MCNC: 7 MG/DL (ref 6–20)
BUN/CREAT SERPL: 6.4 (ref 7–25)
CALCIUM SPEC-SCNC: 8.1 MG/DL (ref 8.6–10.5)
CHLORIDE SERPL-SCNC: 108 MMOL/L (ref 98–107)
CO2 SERPL-SCNC: 22 MMOL/L (ref 22–29)
CREAT SERPL-MCNC: 1.1 MG/DL (ref 0.76–1.27)
DEPRECATED RDW RBC AUTO: 42.4 FL (ref 37–54)
ERYTHROCYTE [DISTWIDTH] IN BLOOD BY AUTOMATED COUNT: 13.5 % (ref 12.3–15.4)
GFR SERPL CREATININE-BSD FRML MDRD: 73 ML/MIN/1.73
GLUCOSE SERPL-MCNC: 82 MG/DL (ref 65–99)
HCT VFR BLD AUTO: 34.9 % (ref 37.5–51)
HGB BLD-MCNC: 11.5 G/DL (ref 13–17.7)
MCH RBC QN AUTO: 29.3 PG (ref 26.6–33)
MCHC RBC AUTO-ENTMCNC: 33.1 G/DL (ref 31.5–35.7)
MCV RBC AUTO: 88.7 FL (ref 79–97)
PLATELET # BLD AUTO: 181 10*3/MM3 (ref 140–450)
PMV BLD AUTO: 8.9 FL (ref 6–12)
POTASSIUM SERPL-SCNC: 3.9 MMOL/L (ref 3.5–5.2)
RBC # BLD AUTO: 3.94 10*6/MM3 (ref 4.14–5.8)
SODIUM SERPL-SCNC: 143 MMOL/L (ref 136–145)
TSH SERPL DL<=0.05 MIU/L-ACNC: 1.05 UIU/ML (ref 0.27–4.2)
VANCOMYCIN TROUGH SERPL-MCNC: 14.2 MCG/ML (ref 5–20)
WBC # BLD AUTO: 8.3 10*3/MM3 (ref 3.4–10.8)

## 2021-06-29 PROCEDURE — 70551 MRI BRAIN STEM W/O DYE: CPT

## 2021-06-29 PROCEDURE — 80048 BASIC METABOLIC PNL TOTAL CA: CPT | Performed by: INTERNAL MEDICINE

## 2021-06-29 PROCEDURE — 80202 ASSAY OF VANCOMYCIN: CPT | Performed by: INTERNAL MEDICINE

## 2021-06-29 PROCEDURE — 25010000002 CEFTRIAXONE PER 250 MG: Performed by: INTERNAL MEDICINE

## 2021-06-29 PROCEDURE — 25010000002 LORAZEPAM PER 2 MG: Performed by: INTERNAL MEDICINE

## 2021-06-29 PROCEDURE — 25010000002 ZIPRASIDONE MESYLATE PER 10 MG: Performed by: PHYSICIAN ASSISTANT

## 2021-06-29 PROCEDURE — 99231 SBSQ HOSP IP/OBS SF/LOW 25: CPT | Performed by: PHYSICIAN ASSISTANT

## 2021-06-29 PROCEDURE — 25010000002 CEFTRIAXONE PER 250 MG: Performed by: HOSPITALIST

## 2021-06-29 PROCEDURE — 84443 ASSAY THYROID STIM HORMONE: CPT | Performed by: NURSE PRACTITIONER

## 2021-06-29 PROCEDURE — 99232 SBSQ HOSP IP/OBS MODERATE 35: CPT | Performed by: NURSE PRACTITIONER

## 2021-06-29 PROCEDURE — 85027 COMPLETE CBC AUTOMATED: CPT | Performed by: INTERNAL MEDICINE

## 2021-06-29 PROCEDURE — 25010000002 VANCOMYCIN 10 G RECONSTITUTED SOLUTION: Performed by: HOSPITALIST

## 2021-06-29 PROCEDURE — 99233 SBSQ HOSP IP/OBS HIGH 50: CPT | Performed by: INTERNAL MEDICINE

## 2021-06-29 RX ADMIN — PREGABALIN 100 MG: 100 CAPSULE ORAL at 21:22

## 2021-06-29 RX ADMIN — CEFTRIAXONE SODIUM 2 G: 2 INJECTION, POWDER, FOR SOLUTION INTRAMUSCULAR; INTRAVENOUS at 16:24

## 2021-06-29 RX ADMIN — VANCOMYCIN HYDROCHLORIDE 1250 MG: 100 INJECTION, POWDER, LYOPHILIZED, FOR SOLUTION INTRAVENOUS at 23:30

## 2021-06-29 RX ADMIN — TERIFLUNOMIDE 14 MG: 14 TABLET, FILM COATED ORAL at 09:52

## 2021-06-29 RX ADMIN — CEFTRIAXONE SODIUM 2 G: 2 INJECTION, POWDER, FOR SOLUTION INTRAMUSCULAR; INTRAVENOUS at 04:41

## 2021-06-29 RX ADMIN — LORAZEPAM 1 MG: 2 INJECTION INTRAMUSCULAR; INTRAVENOUS at 04:45

## 2021-06-29 RX ADMIN — VANCOMYCIN HYDROCHLORIDE 1250 MG: 100 INJECTION, POWDER, LYOPHILIZED, FOR SOLUTION INTRAVENOUS at 11:54

## 2021-06-29 RX ADMIN — NORTRIPTYLINE HYDROCHLORIDE 25 MG: 25 CAPSULE ORAL at 21:22

## 2021-06-29 RX ADMIN — WATER 20 MG: 1 INJECTION INTRAMUSCULAR; INTRAVENOUS; SUBCUTANEOUS at 10:32

## 2021-06-29 RX ADMIN — Medication 3 ML: at 21:21

## 2021-06-29 RX ADMIN — MORPHINE SULFATE 15 MG: 15 TABLET ORAL at 21:26

## 2021-06-29 RX ADMIN — AMLODIPINE BESYLATE 10 MG: 5 TABLET ORAL at 09:52

## 2021-06-29 RX ADMIN — LORAZEPAM 1 MG: 2 INJECTION INTRAMUSCULAR; INTRAVENOUS at 12:10

## 2021-06-29 RX ADMIN — PREGABALIN 100 MG: 100 CAPSULE ORAL at 04:48

## 2021-06-30 LAB
ALBUMIN SERPL-MCNC: 3.5 G/DL (ref 3.5–5.2)
ALBUMIN/GLOB SERPL: 1.5 G/DL
ALP SERPL-CCNC: 60 U/L (ref 39–117)
ALT SERPL W P-5'-P-CCNC: 14 U/L (ref 1–41)
ANION GAP SERPL CALCULATED.3IONS-SCNC: 13 MMOL/L (ref 5–15)
APPEARANCE CSF: CLEAR
AST SERPL-CCNC: 11 U/L (ref 1–40)
BILIRUB SERPL-MCNC: 0.3 MG/DL (ref 0–1.2)
BUN SERPL-MCNC: 7 MG/DL (ref 6–20)
BUN/CREAT SERPL: 7 (ref 7–25)
C GATTII+NEOFOR DNA CSF QL NAA+NON-PROBE: NOT DETECTED
CALCIUM SPEC-SCNC: 8.2 MG/DL (ref 8.6–10.5)
CHLORIDE SERPL-SCNC: 106 MMOL/L (ref 98–107)
CMV DNA CSF QL NAA+PROBE: NOT DETECTED
CO2 SERPL-SCNC: 22 MMOL/L (ref 22–29)
COLOR CSF: COLORLESS
COLOR SPUN CSF: COLORLESS
CREAT SERPL-MCNC: 1 MG/DL (ref 0.76–1.27)
E COLI K1 DNA CSF QL NAA+NON-PROBE: NOT DETECTED
EV RNA CSF QL NAA+PROBE: NOT DETECTED
GFR SERPL CREATININE-BSD FRML MDRD: 81 ML/MIN/1.73
GLOBULIN UR ELPH-MCNC: 2.3 GM/DL
GLUCOSE CSF-MCNC: 60 MG/DL (ref 40–70)
GLUCOSE SERPL-MCNC: 77 MG/DL (ref 65–99)
GP B STREP DNA SPEC QL NAA+PROBE: NOT DETECTED
HAEM INFLU SEROTYP DNA SPEC NAA+PROBE: NOT DETECTED
HHV6 DNA CSF QL NAA+PROBE: NOT DETECTED
HSV1 DNA CSF QL NAA+PROBE: NOT DETECTED
HSV2 DNA CSF QL NAA+PROBE: NOT DETECTED
L MONOCYTOG RRNA SPEC QL PROBE: NOT DETECTED
METHOD: NORMAL
N MEN DNA SPEC QL NAA+PROBE: NOT DETECTED
NUC CELL # CSF MANUAL: 2 /MM3 (ref 0–5)
PARECHOVIRUS A RNA CSF QL NAA+NON-PROBE: NOT DETECTED
POTASSIUM SERPL-SCNC: 3.3 MMOL/L (ref 3.5–5.2)
PROT CSF-MCNC: 45.3 MG/DL (ref 15–45)
PROT SERPL-MCNC: 5.8 G/DL (ref 6–8.5)
RBC # CSF MANUAL: 0 /MM3 (ref 0–0)
S PNEUM DNA CSF QL NAA+NON-PROBE: NOT DETECTED
SODIUM SERPL-SCNC: 141 MMOL/L (ref 136–145)
SPECIMEN VOL CSF: 4 ML
TUBE # CSF: 3
VZV DNA CSF QL NAA+PROBE: NOT DETECTED
WHOLE BLOOD HOLD SPECIMEN: NORMAL

## 2021-06-30 PROCEDURE — 87483 CNS DNA AMP PROBE TYPE 12-25: CPT | Performed by: NURSE PRACTITIONER

## 2021-06-30 PROCEDURE — 87070 CULTURE OTHR SPECIMN AEROBIC: CPT | Performed by: NURSE PRACTITIONER

## 2021-06-30 PROCEDURE — 87015 SPECIMEN INFECT AGNT CONCNTJ: CPT | Performed by: NURSE PRACTITIONER

## 2021-06-30 PROCEDURE — 87102 FUNGUS ISOLATION CULTURE: CPT | Performed by: NURSE PRACTITIONER

## 2021-06-30 PROCEDURE — 99231 SBSQ HOSP IP/OBS SF/LOW 25: CPT | Performed by: PHYSICIAN ASSISTANT

## 2021-06-30 PROCEDURE — 99232 SBSQ HOSP IP/OBS MODERATE 35: CPT | Performed by: INTERNAL MEDICINE

## 2021-06-30 PROCEDURE — 84157 ASSAY OF PROTEIN OTHER: CPT | Performed by: NURSE PRACTITIONER

## 2021-06-30 PROCEDURE — 99233 SBSQ HOSP IP/OBS HIGH 50: CPT | Performed by: NURSE PRACTITIONER

## 2021-06-30 PROCEDURE — 87205 SMEAR GRAM STAIN: CPT | Performed by: NURSE PRACTITIONER

## 2021-06-30 PROCEDURE — 62270 DX LMBR SPI PNXR: CPT | Performed by: NURSE PRACTITIONER

## 2021-06-30 PROCEDURE — 87798 DETECT AGENT NOS DNA AMP: CPT | Performed by: NURSE PRACTITIONER

## 2021-06-30 PROCEDURE — 89050 BODY FLUID CELL COUNT: CPT | Performed by: NURSE PRACTITIONER

## 2021-06-30 PROCEDURE — 009U3ZX DRAINAGE OF SPINAL CANAL, PERCUTANEOUS APPROACH, DIAGNOSTIC: ICD-10-PCS | Performed by: NURSE PRACTITIONER

## 2021-06-30 PROCEDURE — 25010000002 CEFTRIAXONE PER 250 MG: Performed by: INTERNAL MEDICINE

## 2021-06-30 PROCEDURE — 80053 COMPREHEN METABOLIC PANEL: CPT | Performed by: INTERNAL MEDICINE

## 2021-06-30 PROCEDURE — 82945 GLUCOSE OTHER FLUID: CPT | Performed by: NURSE PRACTITIONER

## 2021-06-30 PROCEDURE — 25010000002 VANCOMYCIN 10 G RECONSTITUTED SOLUTION: Performed by: HOSPITALIST

## 2021-06-30 RX ADMIN — TERIFLUNOMIDE 14 MG: 14 TABLET, FILM COATED ORAL at 09:31

## 2021-06-30 RX ADMIN — MORPHINE SULFATE 15 MG: 15 TABLET ORAL at 05:35

## 2021-06-30 RX ADMIN — MORPHINE SULFATE 15 MG: 15 TABLET ORAL at 11:37

## 2021-06-30 RX ADMIN — DOCUSATE SODIUM 50 MG AND SENNOSIDES 8.6 MG 2 TABLET: 8.6; 5 TABLET, FILM COATED ORAL at 20:19

## 2021-06-30 RX ADMIN — AMLODIPINE BESYLATE 10 MG: 5 TABLET ORAL at 09:32

## 2021-06-30 RX ADMIN — CEFTRIAXONE SODIUM 2 G: 2 INJECTION, POWDER, FOR SOLUTION INTRAMUSCULAR; INTRAVENOUS at 05:32

## 2021-06-30 RX ADMIN — CEFTRIAXONE SODIUM 2 G: 2 INJECTION, POWDER, FOR SOLUTION INTRAMUSCULAR; INTRAVENOUS at 17:16

## 2021-06-30 RX ADMIN — DOCUSATE SODIUM 50 MG AND SENNOSIDES 8.6 MG 2 TABLET: 8.6; 5 TABLET, FILM COATED ORAL at 09:32

## 2021-06-30 RX ADMIN — VANCOMYCIN HYDROCHLORIDE 1250 MG: 100 INJECTION, POWDER, LYOPHILIZED, FOR SOLUTION INTRAVENOUS at 23:26

## 2021-06-30 RX ADMIN — MORPHINE SULFATE 15 MG: 15 TABLET ORAL at 20:19

## 2021-06-30 RX ADMIN — MORPHINE SULFATE 60 MG: 15 TABLET, FILM COATED, EXTENDED RELEASE ORAL at 15:27

## 2021-06-30 RX ADMIN — Medication 3 ML: at 20:19

## 2021-06-30 RX ADMIN — VANCOMYCIN HYDROCHLORIDE 1250 MG: 100 INJECTION, POWDER, LYOPHILIZED, FOR SOLUTION INTRAVENOUS at 11:37

## 2021-06-30 RX ADMIN — PREGABALIN 100 MG: 100 CAPSULE ORAL at 13:38

## 2021-06-30 RX ADMIN — SUMATRIPTAN SUCCINATE 25 MG: 25 TABLET ORAL at 13:38

## 2021-06-30 RX ADMIN — PREGABALIN 100 MG: 100 CAPSULE ORAL at 23:25

## 2021-06-30 RX ADMIN — PREGABALIN 100 MG: 100 CAPSULE ORAL at 05:32

## 2021-06-30 RX ADMIN — Medication 3 ML: at 09:33

## 2021-06-30 RX ADMIN — MORPHINE SULFATE 60 MG: 15 TABLET, FILM COATED, EXTENDED RELEASE ORAL at 03:35

## 2021-06-30 RX ADMIN — NORTRIPTYLINE HYDROCHLORIDE 25 MG: 25 CAPSULE ORAL at 20:19

## 2021-06-30 RX ADMIN — Medication 5 MG: at 20:19

## 2021-07-01 VITALS
OXYGEN SATURATION: 96 % | WEIGHT: 250.88 LBS | BODY MASS INDEX: 37.16 KG/M2 | HEIGHT: 69 IN | DIASTOLIC BLOOD PRESSURE: 91 MMHG | SYSTOLIC BLOOD PRESSURE: 127 MMHG | HEART RATE: 98 BPM | RESPIRATION RATE: 15 BRPM | TEMPERATURE: 97.9 F

## 2021-07-01 PROBLEM — R41.82 ALTERED MENTAL STATUS: Status: RESOLVED | Noted: 2021-06-27 | Resolved: 2021-07-01

## 2021-07-01 PROBLEM — G93.41 ACUTE METABOLIC ENCEPHALOPATHY: Status: RESOLVED | Noted: 2021-06-26 | Resolved: 2021-07-01

## 2021-07-01 PROBLEM — R65.10 SIRS (SYSTEMIC INFLAMMATORY RESPONSE SYNDROME) (HCC): Status: RESOLVED | Noted: 2021-06-26 | Resolved: 2021-07-01

## 2021-07-01 PROBLEM — N17.9 AKI (ACUTE KIDNEY INJURY) (HCC): Status: RESOLVED | Noted: 2021-06-26 | Resolved: 2021-07-01

## 2021-07-01 PROBLEM — R41.0 CONFUSION: Status: RESOLVED | Noted: 2021-06-25 | Resolved: 2021-07-01

## 2021-07-01 LAB
ANION GAP SERPL CALCULATED.3IONS-SCNC: 13 MMOL/L (ref 5–15)
BACTERIA SPEC AEROBE CULT: NORMAL
BACTERIA SPEC AEROBE CULT: NORMAL
BUN SERPL-MCNC: 8 MG/DL (ref 6–20)
BUN/CREAT SERPL: 8.1 (ref 7–25)
CALCIUM SPEC-SCNC: 8.4 MG/DL (ref 8.6–10.5)
CHLORIDE SERPL-SCNC: 104 MMOL/L (ref 98–107)
CO2 SERPL-SCNC: 23 MMOL/L (ref 22–29)
CREAT SERPL-MCNC: 0.99 MG/DL (ref 0.76–1.27)
DEPRECATED RDW RBC AUTO: 40.3 FL (ref 37–54)
ERYTHROCYTE [DISTWIDTH] IN BLOOD BY AUTOMATED COUNT: 13.5 % (ref 12.3–15.4)
GFR SERPL CREATININE-BSD FRML MDRD: 82 ML/MIN/1.73
GLUCOSE SERPL-MCNC: 79 MG/DL (ref 65–99)
HCT VFR BLD AUTO: 34.5 % (ref 37.5–51)
HGB BLD-MCNC: 11.9 G/DL (ref 13–17.7)
MCH RBC QN AUTO: 29.6 PG (ref 26.6–33)
MCHC RBC AUTO-ENTMCNC: 34.4 G/DL (ref 31.5–35.7)
MCV RBC AUTO: 86.1 FL (ref 79–97)
PLATELET # BLD AUTO: 185 10*3/MM3 (ref 140–450)
PMV BLD AUTO: 8.5 FL (ref 6–12)
POTASSIUM SERPL-SCNC: 3.1 MMOL/L (ref 3.5–5.2)
RBC # BLD AUTO: 4.01 10*6/MM3 (ref 4.14–5.8)
SODIUM SERPL-SCNC: 140 MMOL/L (ref 136–145)
VANCOMYCIN TROUGH SERPL-MCNC: 25 MCG/ML (ref 5–20)
WBC # BLD AUTO: 6.7 10*3/MM3 (ref 3.4–10.8)

## 2021-07-01 PROCEDURE — 99239 HOSP IP/OBS DSCHRG MGMT >30: CPT | Performed by: INTERNAL MEDICINE

## 2021-07-01 PROCEDURE — 80202 ASSAY OF VANCOMYCIN: CPT | Performed by: HOSPITALIST

## 2021-07-01 PROCEDURE — 80048 BASIC METABOLIC PNL TOTAL CA: CPT | Performed by: INTERNAL MEDICINE

## 2021-07-01 PROCEDURE — 25010000002 CEFTRIAXONE PER 250 MG: Performed by: INTERNAL MEDICINE

## 2021-07-01 PROCEDURE — 99233 SBSQ HOSP IP/OBS HIGH 50: CPT | Performed by: NURSE PRACTITIONER

## 2021-07-01 PROCEDURE — 85027 COMPLETE CBC AUTOMATED: CPT | Performed by: INTERNAL MEDICINE

## 2021-07-01 RX ORDER — POTASSIUM CHLORIDE 20 MEQ/1
40 TABLET, EXTENDED RELEASE ORAL EVERY 4 HOURS
Status: COMPLETED | OUTPATIENT
Start: 2021-07-01 | End: 2021-07-01

## 2021-07-01 RX ORDER — POTASSIUM CHLORIDE 20 MEQ/1
40 TABLET, EXTENDED RELEASE ORAL ONCE
Status: DISCONTINUED | OUTPATIENT
Start: 2021-07-01 | End: 2021-07-01

## 2021-07-01 RX ADMIN — MORPHINE SULFATE 60 MG: 15 TABLET, FILM COATED, EXTENDED RELEASE ORAL at 03:59

## 2021-07-01 RX ADMIN — POTASSIUM CHLORIDE 40 MEQ: 1500 TABLET, EXTENDED RELEASE ORAL at 12:15

## 2021-07-01 RX ADMIN — POTASSIUM CHLORIDE 40 MEQ: 1500 TABLET, EXTENDED RELEASE ORAL at 09:19

## 2021-07-01 RX ADMIN — Medication 3 ML: at 09:20

## 2021-07-01 RX ADMIN — MORPHINE SULFATE 15 MG: 15 TABLET ORAL at 09:25

## 2021-07-01 RX ADMIN — PREGABALIN 100 MG: 100 CAPSULE ORAL at 05:54

## 2021-07-01 RX ADMIN — TERIFLUNOMIDE 14 MG: 14 TABLET, FILM COATED ORAL at 09:20

## 2021-07-01 RX ADMIN — MORPHINE SULFATE 60 MG: 15 TABLET, FILM COATED, EXTENDED RELEASE ORAL at 13:47

## 2021-07-01 RX ADMIN — PREGABALIN 100 MG: 100 CAPSULE ORAL at 13:47

## 2021-07-01 RX ADMIN — CEFTRIAXONE SODIUM 2 G: 2 INJECTION, POWDER, FOR SOLUTION INTRAMUSCULAR; INTRAVENOUS at 05:54

## 2021-07-01 RX ADMIN — AMLODIPINE BESYLATE 10 MG: 5 TABLET ORAL at 09:19

## 2021-07-01 NOTE — PROGRESS NOTES
LOS: 4 days     Chief Complaint: Confusion and hallucinations       SUBJECTIVE:  History taken from: chart RN    Interval History: Pt with hx of chronic pain and MS admitted on 6/25 with confusion and hallucinations. Pt reported N/V and diarrhea prior to admission. He was found to have a low grade fever, leukocytosis, SULTANA, elevated ESR/CRP/lactate. Workup has not revealed a clear source of infection. Plans were made for LP on Sunday, but pt was too delirious and not willing to do the LP. He has been in restraints at times due to combative behavior and concerns for safety. Per outside records, pt has been on steroids in the past, but the last documented steroid pack was in April 2021. No recent med changes that are noted. Pt has been a poor historian so further history has been limited.     Pt treated with Geodon on Monday and Tuesday. We were able to obtain an MRI brain which showed chronic changes from MS, but no acute abnormalities.      Pt has improved and feels he is neurologically at baseline. It is noted that he has been taking nortriptyline which can cause hallucinations and psychosis, but it appears he has been taking this for well over a year with no recent dose changes documented. Pt states he has not taken any extra than the prescribed dose and it has not changed.     Review of Systems   Unable to perform ROS: Mental status change   Constitutional: Positive for fatigue.   Neurological: Positive for numbness (chronic). Negative for dizziness, tremors, seizures, syncope, facial asymmetry, speech difficulty, weakness, light-headedness and headaches.        Pertinent PMH:  has a past medical history of Hypertension, Migraine, and MS (multiple sclerosis) (CMS/Formerly [facility]) (2015).   ________________________________________________     OBJECTIVE:  Pt remains awake and alert today. Encephalopathy resolved.     On exam:  GENERAL: NAD, awake and alert  HEENT: Normocephalic, Atraumatic. Oral mucosa clear and moist.    RESP: Breathing unlabored, breath sounds normal  CARDIO: RRR  SKIN: Warm, dry. No apparent rash.   PSYCH: Mood/affect normal    NEURO:  Oriented x3  EOMI, PERRL, no visual field deficits  No facial asymmetry  Speech clear without dysarthria  Sensations decreased bilaterally, especially the left (chronic from MS)  Strength 5/5 and equal in all extremities  No ataxia      ________________________________________________   RESULTS REVIEW    VITAL SIGNS:  Temp:  [97.9 °F (36.6 °C)-98.6 °F (37 °C)] 98.6 °F (37 °C)  Heart Rate:  [] 96  Resp:  [16-20] 16  BP: (131-147)/(89-98) 131/89    LABS:   Lab Results   Component Value Date    WBC 6.70 07/01/2021    HGB 11.9 (L) 07/01/2021    HCT 34.5 (L) 07/01/2021    MCV 86.1 07/01/2021     07/01/2021     Lab Results   Component Value Date    GLUCOSE 79 07/01/2021    BUN 8 07/01/2021    CREATININE 0.99 07/01/2021    EGFRIFNONA 82 07/01/2021    BCR 8.1 07/01/2021    K 3.1 (L) 07/01/2021    CO2 23.0 07/01/2021    CALCIUM 8.4 (L) 07/01/2021    ALBUMIN 3.50 06/30/2021    AST 11 06/30/2021    ALT 14 06/30/2021       Lab Results   Component Value Date    TSH 1.050 06/29/2021    HOVROMFX50 413 06/26/2021         IMAGING STUDIES:  MRI Brain Without Contrast    Result Date: 6/29/2021  1.Patchy areas of periventricular and subcortical white matter FLAIR hyperintensities, compatible with clinical history of multiple sclerosis. 2.No acute infarction, intracranial hemorrhage, herniation, or hydrocephalus.  Electronically Signed By-Dusty Sibley MD On:6/29/2021 1:42 PM This report was finalized on [id number] by  Dusty Sibley MD.      I reviewed the patient's new clinical results.    ________________________________________________      PROBLEM LIST:    SULTANA (acute kidney injury) (CMS/HCC)    Confusion    SIRS (systemic inflammatory response syndrome) (CMS/HCC)    Acute metabolic encephalopathy    Depression    Chronic pain    Migraine    Hypertension    Altered mental  status        Assessment/Plan   ASSESSMENT/PLAN:  1. Delirium, psychosis, hallucinations, and multifocal myoclonus--resolved. This is not an MS exacerbation.  Patient had acute kidney injury with elevated lactic and leukocytosis on admission and had been sleep deprived. No signs of CNS infection on LP.   - Labs: B12: 413, TSH: 1.050, UA neg for infection, Ammonia: 21, CRP 1.76, ESR: 18  - Medications & labs reviewed  - Correction from previous documentation: It had been reported that pt was taking steroids prior to this admission, but record review from pt's outpt neurologist shows his last steroid dose pack was in April 2021.  - Psychiatry following  - LP done 7/1: CSF unremarkable except mildly elevated protein. Meningitis/encephalitis  panel negative. Cultures and toxoplasma pending.   - Pt is taking nortriptyline outpt which can cause hallucinations/psychosis, but no recent changes and pt denies taking any extra doses.   - Pt has improved and appears at baseline.      2. Recent history of diarrhea and vomiting, resolved   - Monitor, IVF as necessary, KUB normal      3. Leukocytosis (resolved) & elevated lactic   - Blood cultures negative at 3 days  - White count 20 on admission, now 8.3  - Afebrile today  - Antibiotics started prophylactically to cover potential CNS infection     4. Chronic relapsing-remitting MS  - Continue routine follow up with Neurologist, continue home meds     5. SULTANA--- resolved  - Cr >3 on admission, now 1.0  - nephrology following    6. Tachycardia   - Maintaining sinus tachycardia with HR  this admission  - Telemetry  - per primary    **Please refer to previous notes for further details and recommendations.     Will sign off. Please call with questions or concerns.       I discussed the patient's findings and my recommendations with patient, nursing staff and Dr. Dunbar.     Deanne Romero, KATHY  07/01/21  10:24 EDT

## 2021-07-01 NOTE — PROGRESS NOTES
Name: Jorge Luis Nuno  Age: 46 y.o.  : 1975  Sex: male    23    Subjective   Pt admitted with altered mental status      Interval History   Chart reviewed.  S/P LP.      Objective:    Vital Signs  Temp:  [97.9 °F (36.6 °C)-98.6 °F (37 °C)] 97.9 °F (36.6 °C)  Heart Rate:  [] 98  Resp:  [15-20] 15  BP: (127-147)/(89-98) 127/91        Intake/Output Summary (Last 24 hours) at 2021 1344  Last data filed at 2021 0554  Gross per 24 hour   Intake 570 ml   Output --   Net 570 ml           Physical Exam  Physical Exam  Constitutional:       General: He is not in acute distress.     Appearance: He is not diaphoretic.   HENT:      Head: Normocephalic and atraumatic.      Nose: Nose normal.   Eyes:      Pupils: Pupils are equal, round, and reactive to light.   Neck:      Thyroid: No thyromegaly.      Vascular: No JVD.      Trachea: No tracheal deviation.   Cardiovascular:      Rate and Rhythm: Normal rate and regular rhythm.      Heart sounds: No murmur heard.   No friction rub. No gallop.    Pulmonary:      Effort: Pulmonary effort is normal. No respiratory distress.      Breath sounds: No stridor. No wheezing or rales.   Chest:      Chest wall: No tenderness.   Abdominal:      General: Bowel sounds are normal.      Palpations: Abdomen is soft. There is no mass.      Tenderness: There is no abdominal tenderness. There is no guarding or rebound.      Hernia: No hernia is present.   Musculoskeletal:         General: No tenderness or deformity. Normal range of motion.      Cervical back: Normal range of motion and neck supple.   Skin:     General: Skin is warm and dry.      Coloration: Skin is not pale.      Findings: No rash.   Neurological:      Mental Status: He is alert and oriented to person, place, and time.      Cranial Nerves: No cranial nerve deficit.      Motor: No abnormal muscle tone.      Coordination: Coordination normal.      Deep Tendon Reflexes: Reflexes are normal and symmetric. Reflexes  normal.   Psychiatric:         Behavior: Behavior normal.         Thought Content: Thought content normal.         Judgment: Judgment normal.            Results Review:      Results from last 7 days   Lab Units 07/01/21  0326 06/30/21  0706 06/29/21  0630 06/28/21  0406 06/27/21  0124 06/25/21  2219   SODIUM mmol/L 140 141 143 140 134* 135*   CO2 mmol/L 23.0 22.0 22.0 23.0 21.0* 19.0*   BUN mg/dL 8 7 7 12 24* 29*   CREATININE mg/dL 0.99 1.00 1.10 1.07 1.85* 3.26*   CALCIUM mg/dL 8.4* 8.2* 8.1* 7.6* 7.9* 9.3   ALBUMIN g/dL  --  3.50  --   --  3.90 4.50   AST (SGOT) U/L  --  11  --   --  20 18   ALT (SGPT) U/L  --  14  --   --  15 21       Results from last 7 days   Lab Units 07/01/21  0326 06/29/21  0256 06/28/21  0406 06/27/21  0124 06/26/21  0907 06/25/21  2219   WBC 10*3/mm3 6.70 8.30 8.10 15.90* 18.40* 20.10*       Imaging studies: I personally reviewed the patient's most recent pertinent imaging studies       Medication Review:   amLODIPine, 10 mg, Oral, Daily  Morphine, 60 mg, Oral, BID  nortriptyline, 25 mg, Oral, Nightly  pregabalin, 100 mg, Oral, Q8H  senna-docusate sodium, 2 tablet, Oral, BID  sodium chloride, 3 mL, Intravenous, Q12H  Teriflunomide, 14 mg, Oral, Daily          Assesment  SULTANA - Pre-renal + ACE    Hypokalemia     History of multiple sclerosis     Patient with confusion and hallucinations elevated white blood cell count sepsis being ruled out         Plan:  SULTANA resolved  Replace K  Encourage PO  S/P LP    Dusty Castelan MD  07/01/21  13:44 EDT  Tel: [phone]  Fax: [phone]

## 2021-07-01 NOTE — DISCHARGE SUMMARY
Date of Admission: 6/25/2021    Date of Discharge:  7/1/2021    Length of stay:  LOS: 4 days     Presenting Problem:   Confusion [R41.0]  Acute kidney injury (CMS/HCC) [N17.9]  Altered mental status, unspecified altered mental status type [R41.82]  Leukocytosis, unspecified type [D72.829]      Active Diagnosis During Hospital Stay/Discharge Diagnoses/Course by Diagnoses:    Acute toxic metabolic encephalopathy with psychosis -resolved and now seems at baseline  -possibly d/t SULTANA, medication (nortriptyline/baclofen), sleep deprivation  -MRI 6/29/21: patchy white matter densisites c/w h/o MS, no acute infarction, herniation, hydrocephalus, hemorrhage  -LP 7/1/21: no signs of CNS infection   -neurology followed appreciate assistance   -Psych followed     SIRS:  -blood culture no growth 4 days  -Chest x-ray 6/25/2021 -->without acute cardiopulmonary process  -KUB 6/26/2020--> no acute findings  -Urine culture no growth  -COVID-19 ruled out in the ED  -stopped abx based on LP above     AK d/t volume depletion +ACE inhibitor   -Cr now normal   -Nephrology followed     Hypokalemia  -Replaced prior to discharge    Chronic pain with opiate dependence:  -On MS Contin, Lyrica and morphine IR PRN at home  -Follows with pain management physician  -was recently started/increased baclofen but this will be stopped in case was contributing to mental status above, has not beed getting here   -Mostly housebound due to chronic pain and lower extremity weakness     Relapsing remitting multiple sclerosis gait dysfunction and chronic dizziness being treated with meclizine  -Follows with neurology at [facility]  -Claims to be compliant with Aubagio   -originally noted he had been on steroids but after more extensive chart review of outpt Neuro note 6/3/21 the steroid taper was from April '21     Hypertension:  -lisinopril stopped  -continue amlodipine     Depression:  -On nortriptyline     Migraine headache:  -On Maxalt        Active Hospital Problems    Diagnosis  POA   • Depression [F32.9]  Yes   • Chronic pain [G89.29]  Yes   • Migraine [G43.909]  Yes   • Hypertension [I10]  Yes      Resolved Hospital Problems    Diagnosis Date Resolved POA   • **SULTANA (acute kidney injury) (CMS/[facility]) [N17.9] 07/01/2021 Yes   • Altered mental status [R41.82] 07/01/2021 Yes   • SIRS (systemic inflammatory response syndrome) (CMS/[facility]) [R65.10] 07/01/2021 Yes   • Acute metabolic encephalopathy [G93.41] 07/01/2021 Yes   • Confusion [R41.0] 07/01/2021 Yes         Hospital Course  The patient is a 46-years-old male with relapsing remitting MS with 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  Although Bethanechol is listed on his home medication list, the patient claims not to take it regularly.     In addition, the patient usually has left-sided numbness/ pain and is on morphine at home and managed by pain management physician.  He was recently prescribed on 6/3/2021 Flexeril and has baclofen 4 times daily by his neurologist at [facility] and had complained of left-sided numbness and lower extremity tightness during that office visit.  He apparently was on a steroid taper during that visit.     The patient also has history of depression, chronic opiate use, gynecomastia, hypertension, low testosterone, migraine headache, osteopenia and T12 compression fraction.     The patient was brought to the [facility] emergency room on 6/25/2021 for evaluation of visual hallucination and confusion that was witnessed by his family.  The patient is a poor historian but claimed to have nausea, vomiting, fevers, chills and possible diarrhea at home.  He was cooperative but did not know the year or the month during my visit on 6/27/2021.     In the ED, CT of the head ruled out acute intracranial pathology. SIRS was met with leukocytosis and tachycardia.     Date:      6/26/21: Aunt  at the bedside.  Poor historian.  Consulted nephrology.  Ordered KUB.  Started on Rocephin.    6/27/21: Fast call for agitation.Received Ativan Haldol and then  transferred to PCU. Tmax 100.1F. Sitter at the bedside.  Neurology and psychiatry consulted.  Discussed case with the patient's aunt and mother.  No history of psychiatric hospitalization.  Vancomycin for possible CNS infection.  6/28/21: more agitated this am HPI not really obtainable. Nursing reports again issues with agitation and uncooperative, had to be placed in restraints     6/29/21:seemed slightly more calm today and was able to answer some questions. HPI still limited however      6/30/21: marked improvement in mental status, nurse reports has been stable after sleeping most of yesterday into today. Does not really remember the past few days. Feels weak. No fever.  Reports he feels at baseline now    7/1/2021: Still feels back to baseline.  Has been getting up to the bathroom with minimal help.  Mental status improved.  Patient wanting to be discharged home.  Since work-up has been unrevealing and he seems back to his mental status felt stable to do so but will need to continue close outpatient follow-up      Procedures Performed:    06/30 1434 BEDSIDE LUMBAR PUNCTURE    Consults:   Consults     Date and Time Order Name Status Description    6/26/2021 10:37 AM Inpatient Nephrology Consult      6/26/2021  3:11 AM Inpatient Neurology Consult General Completed     6/26/2021  3:11 AM Inpatient Psychiatrist Consult Completed     6/25/2021 11:32 PM Hospitalist (on-call MD unless specified) Completed           Pertinent Test Results:     Results from last 7 days   Lab Units 07/01/21  0326 06/29/21  0256 06/28/21  0406 06/26/21  0907   WBC 10*3/mm3 6.70 8.30 8.10 18.40*   HEMOGLOBIN g/dL 11.9* 11.5* 11.8* 14.6   HEMATOCRIT % 34.5* 34.9* 35.2* 43.3   PLATELETS 10*3/mm3 185 181 161 258   MONOCYTES % %  --   --   --  3.0*     Results from last 7 days   Lab  Units 07/01/21  0326 06/30/21  0706 06/29/21  0630 06/27/21  0124 06/25/21  2219   SODIUM mmol/L 140 141 143 134* 135*   POTASSIUM mmol/L 3.1* 3.3* 3.9 3.6 3.9   CHLORIDE mmol/L 104 106 108* 98 94*   CO2 mmol/L 23.0 22.0 22.0 21.0* 19.0*   BUN mg/dL 8 7 7 24* 29*   CREATININE mg/dL 0.99 1.00 1.10 1.85* 3.26*   CALCIUM mg/dL 8.4* 8.2* 8.1* 7.9* 9.3   BILIRUBIN mg/dL  --  0.3  --  0.4 0.5   ALK PHOS U/L  --  60  --  63 84   ALT (SGPT) U/L  --  14  --  15 21   AST (SGOT) U/L  --  11  --  20 18   GLUCOSE mg/dL 79 77 82 98 85       Microbiology Results (last 10 days)     Procedure Component Value - Date/Time    Culture, CSF - Cerebrospinal Fluid, Lumbar Puncture [[id number]] Collected: 06/30/21 1632    Lab Status: Preliminary result Specimen: Cerebrospinal Fluid from Lumbar Puncture Updated: 07/01/21 0812     CSF Culture No growth     Gram Stain No organisms seen    Meningitis / Encephalitis Panel, PCR - Cerebrospinal Fluid, Lumbar Puncture [[id number]]  (Normal) Collected: 06/30/21 1632    Lab Status: Final result Specimen: Cerebrospinal Fluid from Lumbar Puncture Updated: 06/30/21 1835     ESCHERICHIA COLI K1, PCR Not Detected     HAEMOPHILUS INFLUENZAE, PCR Not Detected     LISTERIA MONOCYTOGENES, PCR Not Detected     NEISSERIA MENINGITIDIS, PCR Not Detected     STREPTOCOCCUS AGALACTIAE, PCR Not Detected     STREPTOCOCCUS PNEUMONIAE, PCR Not Detected     CYTOMEGALOVIRUS (CMV), PCR Not Detected     ENTEROVIRUS, PCR Not Detected     HERPES SIMPLEX VIRUS 1 (HSV-1), PCR Not Detected     HERPES SIMPLEX VIRUS 2 (HSV-2), PCR Not Detected     HUMAN PARECHOVIRUS, PCR Not Detected     VARICELLA ZOSTER VIRUS (VZV), PCR Not Detected     CRYPTOCOCCUS NEOFORMANS / GATTII, PCR Not Detected     HUMAN HERPES VIRUS 6 PCR Not Detected    Blood Culture - Blood, Hand, Right [[id number]] Collected: 06/26/21 1346    Lab Status: Preliminary result Specimen: Blood from Hand, Right Updated: 06/30/21 1400     Blood Culture No growth at 4 days     Blood Culture - Blood, Arm, Right [[id number]] Collected: 06/26/21 1304    Lab Status: Preliminary result Specimen: Blood from Arm, Right Updated: 06/30/21 1315     Blood Culture No growth at 4 days    COVID PRE-OP / PRE-PROCEDURE SCREENING ORDER (NO ISOLATION) - Swab, Nasopharynx [[id number]]  (Normal) Collected: 06/26/21 0136    Lab Status: Final result Specimen: Swab from Nasopharynx Updated: 06/26/21 0200    Narrative:      The following orders were created for panel order COVID PRE-OP / PRE-PROCEDURE SCREENING ORDER (NO ISOLATION) - Swab, Nasopharynx.  Procedure                               Abnormality         Status                     ---------                               -----------         ------                     COVID-19,CEPHEID,COR/CHRISTY...[[id number]]  Normal              Final result                 Please view results for these tests on the individual orders.    COVID-19,CEPHEID,COR/CHRISTY/PAD/LINDA IN-HOUSE(OR EMERGENT/ADD-ON),NP SWAB IN TRANSPORT MEDIA 3-4 HR TAT, RT-PCR - Swab, Nasopharynx [[id number]]  (Normal) Collected: 06/26/21 0136    Lab Status: Final result Specimen: Swab from Nasopharynx Updated: 06/26/21 0200     COVID19 Not Detected    Narrative:      Fact sheet for providers: https://www.fda.gov/media/047052/download     Fact sheet for patients: https://www.fda.gov/media/236613/download  Fact sheet for providers: https://www.fda.gov/media/510001/download    Fact sheet for patients: https://www.fda.gov/media/056627/download    Test performed by PCR.    Urine Culture - Urine, Urine, Clean Catch [[id number]]  (Normal) Collected: 06/25/21 2229    Lab Status: Final result Specimen: Urine, Clean Catch Updated: 06/27/21 1432     Urine Culture No growth              Imaging Results (All)     Procedure Component Value Units Date/Time    MRI Brain Without Contrast [[id number]] Collected: 06/29/21 1335     Updated: 06/29/21 1344    Narrative:      MRI BRAIN WO CONTRAST-     Date of Exam: 6/29/2021 12:30  PM     Indication: Multiple sclerosis, new event.     Comparison Exams: CT head from June 25, 2021     Technique: MRI brain without IV contrast     FINDINGS:  No acute infarction, intracranial hemorrhage, or extra-axial collection  is identified. The ventricles appear stable in caliber, with no evidence  of mass effect or midline shift. The basal cisterns appear patent. There  are patchy areas of periventricular and subcortical white matter FLAIR  hyperintensities. Please note that IV contrast would be required to  evaluate for active demyelination.     The midline structures appear intact. The globes and orbits appear  intact. The intracranial vascular flow voids appear patent.       Impression:      1.Patchy areas of periventricular and subcortical white matter FLAIR  hyperintensities, compatible with clinical history of multiple  sclerosis.  2.No acute infarction, intracranial hemorrhage, herniation, or  hydrocephalus.     Electronically Signed By-Dusty Sibley MD On:6/29/2021 1:42 PM  This report was finalized on [id number] by  Dusty Sibley MD.    XR Abdomen KUB [[id number]] Collected: 06/26/21 1213     Updated: 06/26/21 1216    Narrative:      DATE OF EXAM:  6/26/2021 11:53 AM     PROCEDURE:  XR ABDOMEN KUB-     INDICATIONS:  Sepsis, leukocytosis, acute renal failure.     COMPARISON:  No Comparisons Available     TECHNIQUE:   Single radiographic view of the abdomen was obtained.        FINDINGS:  The bowel gas pattern is unremarkable. There is no pneumatosis or free  air. There are no significant intra-abdominal calcifications. The bony  structures are normal for age.       Impression:      Normal study.     Electronically Signed By-Frank Rodriguez MD On:6/26/2021 12:14 PM  This report was finalized on [id number] by  Frank Rodriguez MD.    US Renal Bilateral [[id number]] Collected: 06/26/21 0549     Updated: 06/26/21 0551    Narrative:      EXAMINATION: US RENAL BILATERAL    DATE:  6/26/2021 5:06  AM    HISTORY: Acute kidney insufficiency  COMPARISON:  None.]  FINDINGS:  Right Kidney:  Measures 10.9 cm.  Normal contour and sonographic appearance without hydronephrosis. Renal parenchymal echotexture is normal  Left Kidney:  Measures 10 cm.  Normal contour and sonographic appearance without hydronephrosis. Renal parenchymal echotexture is normal  The urinary bladder is partially distended and has an unremarkable sonographic appearance.      Impression:        1.  No acute findings.    Electronically signed by:  Chiquis Guadarrama M.D.    6/26/2021 3:50 AM    CT Head Without Contrast [[id number]] Collected: 06/25/21 2206     Updated: 06/25/21 2211    Narrative:      CT HEAD WO CONTRAST-     Date of Exam: 6/25/2021 10:04 PM     Indication: Altered mental status.     Comparison: None available.     Technique: CT scan of the head without IV contrast.  Automated exposure  control and iterative reconstruction methods were used.     FINDINGS  No acute intracranial hemorrhage or extra-axial collection is  identified. The ventricles appear normal in caliber, with no evidence of  mass effect or midline shift. The basal cisterns appear patent. The  gray-white differentiation appears preserved.     The calvarium appears intact. The visualized paranasal sinuses are  clear. The mastoid air cells are well-aerated.       Impression:      No acute intracranial process identified.     Electronically Signed By-Dusty Sibley MD On:6/25/2021 10:09 PM  This report was finalized on [id number] by  Dusty Sibley MD.    XR Chest 1 View [[id number]] Collected: 06/25/21 2055     Updated: 06/25/21 2119    Narrative:      XR CHEST 1 VW-     Date of Exam: 6/25/2021 8:50 PM     Indication: Altered mental status.     Comparison Exams: None available.     Technique: Single AP chest radiograph     FINDINGS:  The lungs are clear. The heart and mediastinal contours appear normal.  The pulmonary vasculature appears normal. The osseous  structures appear  intact.       Impression:      No acute cardiopulmonary process identified.     Electronically Signed By-Dusty Sibley MD On:6/25/2021 8:55 PM  This report was finalized on [id number] by  Dusty Sibley MD.            Condition on Discharge:  Stable     Vital Signs  Temp:  [97.9 °F (36.6 °C)-98.6 °F (37 °C)] 97.9 °F (36.6 °C)  Heart Rate:  [] 98  Resp:  [15-20] 15  BP: (127-147)/(89-98) 127/91    Physical Exam:  Physical Exam  Cardiovascular:      Rate and Rhythm: Normal rate.   Pulmonary:      Effort: Pulmonary effort is normal.   Abdominal:      General: There is no distension.      Palpations: Abdomen is soft.   Musculoskeletal:      Right lower leg: No edema.      Left lower leg: No edema.   Skin:     General: Skin is warm.   Neurological:      General: No focal deficit present.      Mental Status: He is alert and oriented to person, place, and time. Mental status is at baseline.         Discharge Disposition  Home or Self Care    Discharge Medications     Discharge Medications      Continue These Medications      Instructions Start Date   amLODIPine 10 MG tablet  Commonly known as: NORVASC   10 mg, Oral, Daily      Aubagio 14 MG tablet  Generic drug: Teriflunomide   14 mg, Oral, Daily      bethanechol 25 MG tablet  Commonly known as: URECHOLINE   25 mg, Oral, 3 Times Daily      lisinopril 10 MG tablet  Commonly known as: PRINIVIL,ZESTRIL   10 mg, Oral, Daily      meclizine 12.5 MG tablet  Commonly known as: ANTIVERT   12.5 mg, Oral, 3 Times Daily PRN      Morphine 15 MG tablet  Commonly known as: MSIR   15 mg, Oral, Every 6 Hours PRN      Morphine 60 MG 12 hr tablet  Commonly known as: MS CONTIN   60 mg, Oral, 2 Times Daily      nortriptyline 25 MG capsule  Commonly known as: PAMELOR   25 mg, Oral, Nightly      pregabalin 150 MG capsule  Commonly known as: LYRICA   150 mg, Oral, 3 Times Daily      rizatriptan 10 MG tablet  Commonly known as: MAXALT   10 mg, Oral, Once As  Needed, May repeat in 2 hours if needed          Stop These Medications    baclofen 20 MG tablet  Commonly known as: LIORESAL            Discharge Diet:   Diet Instructions     Diet: Regular      Discharge Diet: Regular          Activity at Discharge:   Activity Instructions     Gradually Increase Activity Until at Pre-Hospitalization Level            Follow-up Appointments  No future appointments.  Additional Instructions for the Follow-ups that You Need to Schedule     Discharge Follow-up with PCP   As directed       Currently Documented PCP:    Provider, No Known    PCP Phone Number:    None     Follow Up Details: one week         Discharge Follow-up with Specified Provider: nephrology; 2 Weeks   As directed      To: nephrology    Follow Up: 2 Weeks               Test Results Pending at Discharge  Pending Labs     Order Current Status    Fungus Culture - Cerebrospinal Fluid, CSF Reservoir In process    Toxoplasma Gondii, PCR - Blood, CSF Reservoir In process    Blood Culture - Blood, Arm, Right Preliminary result    Blood Culture - Blood, Hand, Right Preliminary result    Culture, CSF - Cerebrospinal Fluid, Lumbar Puncture Preliminary result           Risk for Readmission (LACE) Score: 7 (7/1/2021  6:01 AM)       This patient has been examined wearing appropriate Personal Protective Equipment . 07/01/21       Time: Discharge 36 min with face-to-face history exam, writing of prescriptions, and documenting discharge data including care coordination with the nursing staff.      Electronically signed by Edgard Dunbar DO, 07/01/21, 13:03 EDT.

## 2021-07-02 ENCOUNTER — READMISSION MANAGEMENT (OUTPATIENT)
Dept: CALL CENTER | Facility: HOSPITAL | Age: 46
End: 2021-07-02

## 2021-07-02 NOTE — OUTREACH NOTE
Prep Survey      Responses   [facility] facility patient discharged from?  [facility]   Is LACE score < 7 ?  Yes   Emergency Room discharge w/ pulse ox?  No   Eligibility  Readm Mgmt   Discharge diagnosis  Acute kidney injury    Does the patient have one of the following disease processes/diagnoses(primary or secondary)?  Other   Does the patient have Home health ordered?  No   Is there a DME ordered?  No   Prep survey completed?  Yes          Obdulia Ashby RN

## 2021-07-02 NOTE — CASE MANAGEMENT/SOCIAL WORK
Case Management Discharge Note      Final Note: home    Provided Post Acute Provider List?: N/A  Provided Post Acute Provider Quality & Resource List?: N/A    Selected Continued Care - Discharged on 7/1/2021 Admission date: 6/25/2021 - Discharge disposition: Home or Self Care              Final Discharge Disposition Code: 01 - home or self-care

## 2021-07-03 LAB
BACTERIA SPEC AEROBE CULT: NORMAL
GRAM STN SPEC: NORMAL

## 2021-07-07 LAB — T GONDII DNA SPEC QL NAA+PROBE: NEGATIVE

## 2021-07-13 ENCOUNTER — TRANSCRIBE ORDERS (OUTPATIENT)
Dept: ADMINISTRATIVE | Facility: HOSPITAL | Age: 46
End: 2021-07-13

## 2021-07-13 DIAGNOSIS — R42 DIZZINESS AND GIDDINESS: Primary | ICD-10-CM

## 2021-07-26 ENCOUNTER — APPOINTMENT (OUTPATIENT)
Dept: CARDIOLOGY | Facility: HOSPITAL | Age: 46
End: 2021-07-26

## 2021-07-28 LAB — FUNGUS WND CULT: NORMAL

## 2021-07-30 ENCOUNTER — APPOINTMENT (OUTPATIENT)
Dept: CARDIOLOGY | Facility: HOSPITAL | Age: 46
End: 2021-07-30

## 2021-08-05 ENCOUNTER — HOSPITAL ENCOUNTER (EMERGENCY)
Facility: HOSPITAL | Age: 46
Discharge: HOME | End: 2021-08-05
Attending: EMERGENCY MEDICINE | Admitting: EMERGENCY MEDICINE

## 2021-08-05 ENCOUNTER — HOSPITAL ENCOUNTER (OUTPATIENT)
Dept: CARDIOLOGY | Facility: HOSPITAL | Age: 46
Discharge: HOME | End: 2021-08-05

## 2021-08-05 VITALS
SYSTOLIC BLOOD PRESSURE: 174 MMHG | RESPIRATION RATE: 16 BRPM | HEIGHT: 69 IN | DIASTOLIC BLOOD PRESSURE: 119 MMHG | BODY MASS INDEX: 37.94 KG/M2 | WEIGHT: 256.17 LBS | TEMPERATURE: 98 F | HEART RATE: 100 BPM | OXYGEN SATURATION: 98 %

## 2021-08-05 DIAGNOSIS — R51.9 NONINTRACTABLE HEADACHE, UNSPECIFIED CHRONICITY PATTERN, UNSPECIFIED HEADACHE TYPE: ICD-10-CM

## 2021-08-05 DIAGNOSIS — I10 HYPERTENSION, UNSPECIFIED TYPE: Primary | ICD-10-CM

## 2021-08-05 DIAGNOSIS — R42 DIZZINESS AND GIDDINESS: ICD-10-CM

## 2021-08-05 LAB
ANION GAP SERPL CALCULATED.3IONS-SCNC: 9 MMOL/L (ref 5–15)
BASOPHILS # BLD AUTO: 0 10*3/MM3 (ref 0–0.2)
BASOPHILS NFR BLD AUTO: 0.4 % (ref 0–1.5)
BUN SERPL-MCNC: 16 MG/DL (ref 6–20)
BUN/CREAT SERPL: 15.5 (ref 7–25)
CALCIUM SPEC-SCNC: 8.9 MG/DL (ref 8.6–10.5)
CHLORIDE SERPL-SCNC: 104 MMOL/L (ref 98–107)
CO2 SERPL-SCNC: 27 MMOL/L (ref 22–29)
CREAT SERPL-MCNC: 1.03 MG/DL (ref 0.76–1.27)
DEPRECATED RDW RBC AUTO: 45.9 FL (ref 37–54)
EOSINOPHIL # BLD AUTO: 0.2 10*3/MM3 (ref 0–0.4)
EOSINOPHIL NFR BLD AUTO: 1.5 % (ref 0.3–6.2)
ERYTHROCYTE [DISTWIDTH] IN BLOOD BY AUTOMATED COUNT: 14.7 % (ref 12.3–15.4)
GFR SERPL CREATININE-BSD FRML MDRD: 78 ML/MIN/1.73
GLUCOSE SERPL-MCNC: 102 MG/DL (ref 65–99)
HCT VFR BLD AUTO: 43.2 % (ref 37.5–51)
HGB BLD-MCNC: 14.4 G/DL (ref 13–17.7)
HOLD SPECIMEN: NORMAL
HOLD SPECIMEN: NORMAL
LYMPHOCYTES # BLD AUTO: 1.7 10*3/MM3 (ref 0.7–3.1)
LYMPHOCYTES NFR BLD AUTO: 14.8 % (ref 19.6–45.3)
MAXIMAL PREDICTED HEART RATE: 176 BPM
MCH RBC QN AUTO: 29.8 PG (ref 26.6–33)
MCHC RBC AUTO-ENTMCNC: 33.4 G/DL (ref 31.5–35.7)
MCV RBC AUTO: 89.1 FL (ref 79–97)
MONOCYTES # BLD AUTO: 0.9 10*3/MM3 (ref 0.1–0.9)
MONOCYTES NFR BLD AUTO: 8.1 % (ref 5–12)
NEUTROPHILS NFR BLD AUTO: 75.2 % (ref 42.7–76)
NEUTROPHILS NFR BLD AUTO: 8.5 10*3/MM3 (ref 1.7–7)
NRBC BLD AUTO-RTO: 0 /100 WBC (ref 0–0.2)
PLATELET # BLD AUTO: 229 10*3/MM3 (ref 140–450)
PMV BLD AUTO: 9.2 FL (ref 6–12)
POTASSIUM SERPL-SCNC: 4 MMOL/L (ref 3.5–5.2)
RBC # BLD AUTO: 4.85 10*6/MM3 (ref 4.14–5.8)
SODIUM SERPL-SCNC: 140 MMOL/L (ref 136–145)
STRESS TARGET HR: 150 BPM
WBC # BLD AUTO: 11.4 10*3/MM3 (ref 3.4–10.8)
WHOLE BLOOD HOLD SPECIMEN: NORMAL

## 2021-08-05 PROCEDURE — 93005 ELECTROCARDIOGRAM TRACING: CPT | Performed by: EMERGENCY MEDICINE

## 2021-08-05 PROCEDURE — 85025 COMPLETE CBC W/AUTO DIFF WBC: CPT | Performed by: EMERGENCY MEDICINE

## 2021-08-05 PROCEDURE — 99284 EMERGENCY DEPT VISIT MOD MDM: CPT

## 2021-08-05 PROCEDURE — 96374 THER/PROPH/DIAG INJ IV PUSH: CPT

## 2021-08-05 PROCEDURE — 96376 TX/PRO/DX INJ SAME DRUG ADON: CPT

## 2021-08-05 PROCEDURE — 80048 BASIC METABOLIC PNL TOTAL CA: CPT | Performed by: EMERGENCY MEDICINE

## 2021-08-05 RX ORDER — AMLODIPINE BESYLATE 5 MG/1
5 TABLET ORAL DAILY
Qty: 15 TABLET | Refills: 0 | Status: ON HOLD | OUTPATIENT
Start: 2021-08-05 | End: 2022-04-30

## 2021-08-05 RX ORDER — ENALAPRILAT 2.5 MG/2ML
1.25 INJECTION INTRAVENOUS ONCE
Status: COMPLETED | OUTPATIENT
Start: 2021-08-05 | End: 2021-08-05

## 2021-08-05 RX ORDER — SODIUM CHLORIDE 0.9 % (FLUSH) 0.9 %
10 SYRINGE (ML) INJECTION AS NEEDED
Status: DISCONTINUED | OUTPATIENT
Start: 2021-08-05 | End: 2021-08-05 | Stop reason: HOSPADM

## 2021-08-05 RX ADMIN — ENALAPRILAT 1.25 MG: 2.5 INJECTION INTRAVENOUS at 16:31

## 2021-08-05 RX ADMIN — ENALAPRILAT 1.25 MG: 2.5 INJECTION INTRAVENOUS at 17:17

## 2021-08-05 NOTE — ED PROVIDER NOTES
Subjective   Patient is a 46-year-old male complaint of elevated blood pressure over the past several days.  Complains of mild headache.  He states his medications for his hypertension have been adjusted recently.  He denies ears throat neck pain dizziness vomiting or other complaint          Review of Systems  Negative previous headache ears throat neck pain dizziness vomiting cough fever chest pain or other complaint  Past Medical History:   Diagnosis Date   • Hypertension    • Migraine    • MS (multiple sclerosis) (CMS/[facility]) 2015       No Known Allergies    Past Surgical History:   Procedure Laterality Date   • FOOT FRACTURE SURGERY Left 2002   • FOOT FRACTURE SURGERY Left    • LEG SURGERY Right     Fibula plates and screws       No family history on file.    Social History     Socioeconomic History   • Marital status:      Spouse name: Not on file   • Number of children: Not on file   • Years of education: Not on file   • Highest education level: Not on file   Tobacco Use   • Smoking status: Never Smoker   • Smokeless tobacco: Never Used   Vaping Use   • Vaping Use: Never used   Substance and Sexual Activity   • Alcohol use: Never   • Drug use: Never   • Sexual activity: Defer           Objective   Physical Exam  Neurologic exam is nonfocal.  HEENT exam shows TMs to be clear from oropharynx comers.  Neck has no adenopathy.  Lungs are clear.  Heart is rate rhythm.  M soft nontender.  Extremity exam unremarkable.  Procedures  My EKG interpretation shows normal sinus rhythm with no acute ST change         ED Course      Results for orders placed or performed during the hospital encounter of 08/05/21   Basic Metabolic Panel    Specimen: Blood   Result Value Ref Range    Glucose 102 (H) 65 - 99 mg/dL    BUN 16 6 - 20 mg/dL    Creatinine 1.03 0.76 - 1.27 mg/dL    Sodium 140 136 - 145 mmol/L    Potassium 4.0 3.5 - 5.2 mmol/L    Chloride 104 98 - 107 mmol/L    CO2 27.0 22.0 - 29.0 mmol/L    Calcium 8.9 8.6 - 10.5  mg/dL    eGFR Non African Amer 78 >60 mL/min/1.73    BUN/Creatinine Ratio 15.5 7.0 - 25.0    Anion Gap 9.0 5.0 - 15.0 mmol/L   CBC Auto Differential    Specimen: Blood   Result Value Ref Range    WBC 11.40 (H) 3.40 - 10.80 10*3/mm3    RBC 4.85 4.14 - 5.80 10*6/mm3    Hemoglobin 14.4 13.0 - 17.7 g/dL    Hematocrit 43.2 37.5 - 51.0 %    MCV 89.1 79.0 - 97.0 fL    MCH 29.8 26.6 - 33.0 pg    MCHC 33.4 31.5 - 35.7 g/dL    RDW 14.7 12.3 - 15.4 %    RDW-SD 45.9 37.0 - 54.0 fl    MPV 9.2 6.0 - 12.0 fL    Platelets 229 140 - 450 10*3/mm3    Neutrophil % 75.2 42.7 - 76.0 %    Lymphocyte % 14.8 (L) 19.6 - 45.3 %    Monocyte % 8.1 5.0 - 12.0 %    Eosinophil % 1.5 0.3 - 6.2 %    Basophil % 0.4 0.0 - 1.5 %    Neutrophils, Absolute 8.50 (H) 1.70 - 7.00 10*3/mm3    Lymphocytes, Absolute 1.70 0.70 - 3.10 10*3/mm3    Monocytes, Absolute 0.90 0.10 - 0.90 10*3/mm3    Eosinophils, Absolute 0.20 0.00 - 0.40 10*3/mm3    Basophils, Absolute 0.00 0.00 - 0.20 10*3/mm3    nRBC 0.0 0.0 - 0.2 /100 WBC   ECG 12 Lead   Result Value Ref Range    QT Interval 348 ms   Green Top (Gel)   Result Value Ref Range    Extra Tube Hold for add-ons.    Lavender Top   Result Value Ref Range    Extra Tube hold for add-on    Gold Top - SST   Result Value Ref Range    Extra Tube Hold for add-ons.                                           MDM  Number of Diagnoses or Management Options  Diagnosis management comments: Patient was given Vasotec IV.  Current blood pressure 180/100.  Patient's headache is improving.  Patient has no metabolic abnormalities.  There is no evidence of acute change on his EKG.  Patient will be discharged.  He will be started on Vasotec and will see his MD for recheck tomorrow as scheduled.       Amount and/or Complexity of Data Reviewed  Clinical lab tests: reviewed  Tests in the medicine section of CPT®: reviewed    Risk of Complications, Morbidity, and/or Mortality  Presenting problems: high  Diagnostic procedures: high  Management  "options: high    Patient Progress  Patient progress: stable      Final diagnoses:   Hypertension, unspecified type   Nonintractable headache, unspecified chronicity pattern, unspecified headache type       ED Disposition  ED Disposition     ED Disposition Condition Comment    Discharge Stable           No follow-up provider specified.       Medication List      Changed    * amLODIPine 10 MG tablet  Commonly known as: NORVASC  What changed: Another medication with the same name was added. Make sure you understand how and when to take each.     * amLODIPine 5 MG tablet  Commonly known as: NORVASC  Take 1 tablet by mouth Daily.  What changed: You were already taking a medication with the same name, and this prescription was added. Make sure you understand how and when to take each.         * This list has 2 medication(s) that are the same as other medications prescribed for you. Read the directions carefully, and ask your doctor or other care provider to review them with you.               Where to Get Your Medications      These medications were sent to [facility]/pharmacy #[id number] - [place], [place] - [address] [facility] - [phone] [facility] [phone]   [address], [place] [address]    Phone: [phone]   · amLODIPine 5 MG tablet          Anirudh Rosario MD  08/05/21 2216    "

## 2021-08-05 NOTE — ED NOTES
Pt reports a history of MS states he has left sided numbness but it is not more than normal     Isabela York  08/05/21 2787

## 2021-08-08 LAB — QT INTERVAL: 348 MS

## 2021-10-11 ENCOUNTER — TRANSCRIBE ORDERS (OUTPATIENT)
Dept: CARDIOLOGY | Facility: HOSPITAL | Age: 46
End: 2021-10-11

## 2022-03-07 ENCOUNTER — HOSPITAL ENCOUNTER (OUTPATIENT)
Dept: INFUSION THERAPY | Facility: HOSPITAL | Age: 47
Discharge: HOME | End: 2022-03-07
Admitting: NURSE PRACTITIONER

## 2022-03-07 VITALS
RESPIRATION RATE: 16 BRPM | DIASTOLIC BLOOD PRESSURE: 83 MMHG | SYSTOLIC BLOOD PRESSURE: 111 MMHG | TEMPERATURE: 97.1 F | HEART RATE: 82 BPM | OXYGEN SATURATION: 93 %

## 2022-03-07 DIAGNOSIS — U07.1 COVID-19: Primary | ICD-10-CM

## 2022-03-07 PROCEDURE — 25010000002 SOTROVIMAB 500 MG/8ML SOLUTION: Performed by: NURSE PRACTITIONER

## 2022-03-07 PROCEDURE — M0247 HC INTRAVENOUS INFUSION SOTROVIMAB: HCPCS

## 2022-03-07 PROCEDURE — 36415 COLL VENOUS BLD VENIPUNCTURE: CPT

## 2022-03-07 PROCEDURE — 96365 THER/PROPH/DIAG IV INF INIT: CPT

## 2022-03-07 PROCEDURE — 96361 HYDRATE IV INFUSION ADD-ON: CPT

## 2022-03-07 PROCEDURE — M0247 HC INTRAVENOUS INFUSION SOTROVIMAB: HCPCS | Performed by: NURSE PRACTITIONER

## 2022-03-07 RX ORDER — EPINEPHRINE 1 MG/ML
0.3 INJECTION, SOLUTION INTRAMUSCULAR; SUBCUTANEOUS AS NEEDED
OUTPATIENT
Start: 2022-03-07

## 2022-03-07 RX ORDER — DIPHENHYDRAMINE HCL 25 MG
50 CAPSULE ORAL ONCE AS NEEDED
OUTPATIENT
Start: 2022-03-07

## 2022-03-07 RX ORDER — METHYLPREDNISOLONE SODIUM SUCCINATE 125 MG/2ML
125 INJECTION, POWDER, LYOPHILIZED, FOR SOLUTION INTRAMUSCULAR; INTRAVENOUS AS NEEDED
OUTPATIENT
Start: 2022-03-07

## 2022-03-07 RX ORDER — SODIUM CHLORIDE 9 MG/ML
30 INJECTION, SOLUTION INTRAVENOUS ONCE
Status: CANCELLED | OUTPATIENT
Start: 2022-03-07

## 2022-03-07 RX ORDER — SODIUM CHLORIDE 9 MG/ML
30 INJECTION, SOLUTION INTRAVENOUS ONCE
Status: COMPLETED | OUTPATIENT
Start: 2022-03-07 | End: 2022-03-07

## 2022-03-07 RX ORDER — DIPHENHYDRAMINE HYDROCHLORIDE 50 MG/ML
50 INJECTION INTRAMUSCULAR; INTRAVENOUS ONCE AS NEEDED
OUTPATIENT
Start: 2022-03-07

## 2022-03-07 RX ORDER — EPINEPHRINE 1 MG/ML
0.3 INJECTION, SOLUTION, CONCENTRATE INTRAVENOUS AS NEEDED
OUTPATIENT
Start: 2022-03-07

## 2022-03-07 RX ORDER — DIPHENHYDRAMINE HCL 25 MG
50 TABLET ORAL ONCE AS NEEDED
OUTPATIENT
Start: 2022-03-07

## 2022-03-07 RX ADMIN — SODIUM CHLORIDE 500 MG: 9 INJECTION, SOLUTION INTRAVENOUS at 14:32

## 2022-03-07 RX ADMIN — SODIUM CHLORIDE 30 ML/HR: 9 INJECTION, SOLUTION INTRAVENOUS at 15:03

## 2022-03-08 ENCOUNTER — TELEPHONE (OUTPATIENT)
Dept: INFUSION THERAPY | Facility: HOSPITAL | Age: 47
End: 2022-03-08

## 2022-03-10 ENCOUNTER — TELEPHONE (OUTPATIENT)
Dept: INFUSION THERAPY | Facility: HOSPITAL | Age: 47
End: 2022-03-10

## 2022-03-10 NOTE — TELEPHONE ENCOUNTER
Patient was here on 3/7/22 for Sotrovimab. Unfortunately, he did not actually get the medication because the tubing was clamped. I called and explained this to the patient's mother because we had been unsuccessful in getting a hold of the patient. I explained that because of our error we will void this encounter so that he will not be billed for it.

## 2022-04-30 ENCOUNTER — APPOINTMENT (OUTPATIENT)
Dept: CT IMAGING | Facility: HOSPITAL | Age: 47
End: 2022-04-30

## 2022-04-30 ENCOUNTER — APPOINTMENT (OUTPATIENT)
Dept: GENERAL RADIOLOGY | Facility: HOSPITAL | Age: 47
End: 2022-04-30

## 2022-04-30 ENCOUNTER — HOSPITAL ENCOUNTER (OUTPATIENT)
Facility: HOSPITAL | Age: 47
Setting detail: OBSERVATION
Discharge: HOME | End: 2022-05-02
Attending: EMERGENCY MEDICINE | Admitting: HOSPITALIST

## 2022-04-30 DIAGNOSIS — I95.1 ORTHOSTASIS: ICD-10-CM

## 2022-04-30 DIAGNOSIS — R42 DIZZINESS: Primary | ICD-10-CM

## 2022-04-30 LAB
ALBUMIN SERPL-MCNC: 4.2 G/DL (ref 3.5–5.2)
ALBUMIN/GLOB SERPL: 1.4 G/DL
ALP SERPL-CCNC: 72 U/L (ref 39–117)
ALT SERPL W P-5'-P-CCNC: 18 U/L (ref 1–41)
ANION GAP SERPL CALCULATED.3IONS-SCNC: 12 MMOL/L (ref 5–15)
ANION GAP SERPL CALCULATED.3IONS-SCNC: 12 MMOL/L (ref 5–15)
APTT PPP: 27.8 SECONDS (ref 61–76.5)
ARTERIAL PATENCY WRIST A: POSITIVE
AST SERPL-CCNC: 12 U/L (ref 1–40)
ATMOSPHERIC PRESS: ABNORMAL MM[HG]
BASE EXCESS BLDA CALC-SCNC: 1.6 MMOL/L (ref 0–3)
BASOPHILS # BLD AUTO: 0 10*3/MM3 (ref 0–0.2)
BASOPHILS NFR BLD AUTO: 0.2 % (ref 0–1.5)
BDY SITE: ABNORMAL
BILIRUB SERPL-MCNC: 0.4 MG/DL (ref 0–1.2)
BILIRUB UR QL STRIP: NEGATIVE
BUN SERPL-MCNC: 15 MG/DL (ref 6–20)
BUN SERPL-MCNC: 15 MG/DL (ref 6–20)
BUN/CREAT SERPL: 16.5 (ref 7–25)
BUN/CREAT SERPL: 16.5 (ref 7–25)
CALCIUM SPEC-SCNC: 9.1 MG/DL (ref 8.6–10.5)
CALCIUM SPEC-SCNC: 9.1 MG/DL (ref 8.6–10.5)
CHLORIDE SERPL-SCNC: 98 MMOL/L (ref 98–107)
CHLORIDE SERPL-SCNC: 98 MMOL/L (ref 98–107)
CLARITY UR: CLEAR
CO2 BLDA-SCNC: 30.3 MMOL/L (ref 22–29)
CO2 SERPL-SCNC: 27 MMOL/L (ref 22–29)
CO2 SERPL-SCNC: 27 MMOL/L (ref 22–29)
COLOR UR: YELLOW
CREAT SERPL-MCNC: 0.91 MG/DL (ref 0.76–1.27)
CREAT SERPL-MCNC: 0.91 MG/DL (ref 0.76–1.27)
D DIMER PPP FEU-MCNC: 0.42 MG/L (FEU) (ref 0–0.59)
D-LACTATE SERPL-SCNC: 1.1 MMOL/L (ref 0.5–2)
DEPRECATED RDW RBC AUTO: 46.8 FL (ref 37–54)
EGFRCR SERPLBLD CKD-EPI 2021: 106.6 ML/MIN/1.73
EGFRCR SERPLBLD CKD-EPI 2021: 106.6 ML/MIN/1.73
EOSINOPHIL # BLD AUTO: 0 10*3/MM3 (ref 0–0.4)
EOSINOPHIL NFR BLD AUTO: 0.1 % (ref 0.3–6.2)
ERYTHROCYTE [DISTWIDTH] IN BLOOD BY AUTOMATED COUNT: 15.1 % (ref 12.3–15.4)
GLOBULIN UR ELPH-MCNC: 3.1 GM/DL
GLUCOSE BLDC GLUCOMTR-MCNC: 131 MG/DL (ref 70–105)
GLUCOSE SERPL-MCNC: 113 MG/DL (ref 65–99)
GLUCOSE SERPL-MCNC: 113 MG/DL (ref 65–99)
GLUCOSE UR STRIP-MCNC: NEGATIVE MG/DL
HCO3 BLDA-SCNC: 28.6 MMOL/L (ref 21–28)
HCT VFR BLD AUTO: 44.4 % (ref 37.5–51)
HEMODILUTION: NO
HGB BLD-MCNC: 14.4 G/DL (ref 13–17.7)
HGB UR QL STRIP.AUTO: NEGATIVE
INHALED O2 CONCENTRATION: 28 %
INR PPP: 1.04 (ref 0.93–1.1)
KETONES UR QL STRIP: ABNORMAL
LEUKOCYTE ESTERASE UR QL STRIP.AUTO: NEGATIVE
LYMPHOCYTES # BLD AUTO: 1.8 10*3/MM3 (ref 0.7–3.1)
LYMPHOCYTES NFR BLD AUTO: 10.4 % (ref 19.6–45.3)
MAGNESIUM SERPL-MCNC: 2 MG/DL (ref 1.6–2.6)
MCH RBC QN AUTO: 28.8 PG (ref 26.6–33)
MCHC RBC AUTO-ENTMCNC: 32.4 G/DL (ref 31.5–35.7)
MCV RBC AUTO: 89.1 FL (ref 79–97)
MODALITY: ABNORMAL
MONOCYTES # BLD AUTO: 1 10*3/MM3 (ref 0.1–0.9)
MONOCYTES NFR BLD AUTO: 6 % (ref 5–12)
NEUTROPHILS NFR BLD AUTO: 14.6 10*3/MM3 (ref 1.7–7)
NEUTROPHILS NFR BLD AUTO: 83.3 % (ref 42.7–76)
NITRITE UR QL STRIP: NEGATIVE
NRBC BLD AUTO-RTO: 0.1 /100 WBC (ref 0–0.2)
NT-PROBNP SERPL-MCNC: 96 PG/ML (ref 0–450)
PCO2 BLDA: 53.5 MM HG (ref 35–48)
PH BLDA: 7.34 PH UNITS (ref 7.35–7.45)
PH UR STRIP.AUTO: 6.5 [PH] (ref 5–8)
PLATELET # BLD AUTO: 177 10*3/MM3 (ref 140–450)
PMV BLD AUTO: 8.8 FL (ref 6–12)
PO2 BLDA: 112.4 MM HG (ref 83–108)
POTASSIUM SERPL-SCNC: 4.5 MMOL/L (ref 3.5–5.2)
POTASSIUM SERPL-SCNC: 4.5 MMOL/L (ref 3.5–5.2)
PROT SERPL-MCNC: 7.3 G/DL (ref 6–8.5)
PROT UR QL STRIP: ABNORMAL
PROTHROMBIN TIME: 10.7 SECONDS (ref 9.6–11.7)
RBC # BLD AUTO: 4.98 10*6/MM3 (ref 4.14–5.8)
SAO2 % BLDCOA: 98 % (ref 94–98)
SODIUM SERPL-SCNC: 137 MMOL/L (ref 136–145)
SODIUM SERPL-SCNC: 137 MMOL/L (ref 136–145)
SP GR UR STRIP: 1.02 (ref 1–1.03)
TROPONIN T SERPL-MCNC: <0.01 NG/ML (ref 0–0.03)
UROBILINOGEN UR QL STRIP: ABNORMAL
WBC NRBC COR # BLD: 17.6 10*3/MM3 (ref 3.4–10.8)

## 2022-04-30 PROCEDURE — 85730 THROMBOPLASTIN TIME PARTIAL: CPT | Performed by: EMERGENCY MEDICINE

## 2022-04-30 PROCEDURE — G0378 HOSPITAL OBSERVATION PER HR: HCPCS

## 2022-04-30 PROCEDURE — 85610 PROTHROMBIN TIME: CPT | Performed by: EMERGENCY MEDICINE

## 2022-04-30 PROCEDURE — 85025 COMPLETE CBC W/AUTO DIFF WBC: CPT | Performed by: EMERGENCY MEDICINE

## 2022-04-30 PROCEDURE — 80053 COMPREHEN METABOLIC PANEL: CPT | Performed by: EMERGENCY MEDICINE

## 2022-04-30 PROCEDURE — 36415 COLL VENOUS BLD VENIPUNCTURE: CPT

## 2022-04-30 PROCEDURE — 96372 THER/PROPH/DIAG INJ SC/IM: CPT

## 2022-04-30 PROCEDURE — 25010000002 HEPARIN (PORCINE) PER 1000 UNITS: Performed by: HOSPITALIST

## 2022-04-30 PROCEDURE — 93005 ELECTROCARDIOGRAM TRACING: CPT

## 2022-04-30 PROCEDURE — 70450 CT HEAD/BRAIN W/O DYE: CPT

## 2022-04-30 PROCEDURE — 81003 URINALYSIS AUTO W/O SCOPE: CPT | Performed by: HOSPITALIST

## 2022-04-30 PROCEDURE — 85379 FIBRIN DEGRADATION QUANT: CPT | Performed by: EMERGENCY MEDICINE

## 2022-04-30 PROCEDURE — 94761 N-INVAS EAR/PLS OXIMETRY MLT: CPT

## 2022-04-30 PROCEDURE — 36600 WITHDRAWAL OF ARTERIAL BLOOD: CPT

## 2022-04-30 PROCEDURE — 83735 ASSAY OF MAGNESIUM: CPT | Performed by: EMERGENCY MEDICINE

## 2022-04-30 PROCEDURE — 71045 X-RAY EXAM CHEST 1 VIEW: CPT

## 2022-04-30 PROCEDURE — 83605 ASSAY OF LACTIC ACID: CPT

## 2022-04-30 PROCEDURE — 99284 EMERGENCY DEPT VISIT MOD MDM: CPT

## 2022-04-30 PROCEDURE — 94799 UNLISTED PULMONARY SVC/PX: CPT

## 2022-04-30 PROCEDURE — 84484 ASSAY OF TROPONIN QUANT: CPT | Performed by: EMERGENCY MEDICINE

## 2022-04-30 PROCEDURE — 82803 BLOOD GASES ANY COMBINATION: CPT

## 2022-04-30 PROCEDURE — 82962 GLUCOSE BLOOD TEST: CPT

## 2022-04-30 PROCEDURE — 83880 ASSAY OF NATRIURETIC PEPTIDE: CPT | Performed by: EMERGENCY MEDICINE

## 2022-04-30 PROCEDURE — 99219 PR INITIAL OBSERVATION CARE/DAY 50 MINUTES: CPT | Performed by: HOSPITALIST

## 2022-04-30 RX ORDER — SODIUM CHLORIDE 0.9 % (FLUSH) 0.9 %
10 SYRINGE (ML) INJECTION AS NEEDED
Status: DISCONTINUED | OUTPATIENT
Start: 2022-04-30 | End: 2022-05-02 | Stop reason: HOSPADM

## 2022-04-30 RX ORDER — MORPHINE SULFATE 30 MG/1
60 TABLET, FILM COATED, EXTENDED RELEASE ORAL 2 TIMES DAILY
Status: DISCONTINUED | OUTPATIENT
Start: 2022-04-30 | End: 2022-04-30

## 2022-04-30 RX ORDER — LISINOPRIL 5 MG/1
10 TABLET ORAL DAILY
Status: DISCONTINUED | OUTPATIENT
Start: 2022-05-01 | End: 2022-05-02 | Stop reason: HOSPADM

## 2022-04-30 RX ORDER — MORPHINE SULFATE 30 MG/1
30 TABLET, FILM COATED, EXTENDED RELEASE ORAL ONCE
Status: COMPLETED | OUTPATIENT
Start: 2022-04-30 | End: 2022-04-30

## 2022-04-30 RX ORDER — MECLIZINE HYDROCHLORIDE 25 MG/1
12.5 TABLET ORAL 3 TIMES DAILY PRN
Status: DISCONTINUED | OUTPATIENT
Start: 2022-04-30 | End: 2022-05-02 | Stop reason: HOSPADM

## 2022-04-30 RX ORDER — PREGABALIN 75 MG/1
150 CAPSULE ORAL 3 TIMES DAILY
Status: DISCONTINUED | OUTPATIENT
Start: 2022-04-30 | End: 2022-05-02 | Stop reason: HOSPADM

## 2022-04-30 RX ORDER — SODIUM CHLORIDE 0.9 % (FLUSH) 0.9 %
10 SYRINGE (ML) INJECTION EVERY 12 HOURS SCHEDULED
Status: DISCONTINUED | OUTPATIENT
Start: 2022-04-30 | End: 2022-05-02 | Stop reason: HOSPADM

## 2022-04-30 RX ORDER — HEPARIN SODIUM 5000 [USP'U]/ML
5000 INJECTION, SOLUTION INTRAVENOUS; SUBCUTANEOUS EVERY 12 HOURS SCHEDULED
Status: DISCONTINUED | OUTPATIENT
Start: 2022-04-30 | End: 2022-05-02 | Stop reason: HOSPADM

## 2022-04-30 RX ORDER — MORPHINE SULFATE 15 MG/1
15 TABLET ORAL EVERY 6 HOURS PRN
Status: DISCONTINUED | OUTPATIENT
Start: 2022-04-30 | End: 2022-05-02 | Stop reason: HOSPADM

## 2022-04-30 RX ORDER — SODIUM CHLORIDE 9 MG/ML
100 INJECTION, SOLUTION INTRAVENOUS CONTINUOUS
Status: DISCONTINUED | OUTPATIENT
Start: 2022-04-30 | End: 2022-05-02 | Stop reason: HOSPADM

## 2022-04-30 RX ORDER — BACLOFEN 20 MG/1
20 TABLET ORAL 3 TIMES DAILY
COMMUNITY
End: 2022-05-13 | Stop reason: SDUPTHER

## 2022-04-30 RX ORDER — LISINOPRIL 10 MG/1
10 TABLET ORAL DAILY
COMMUNITY

## 2022-04-30 RX ORDER — SUMATRIPTAN 25 MG/1
25 TABLET, FILM COATED ORAL ONCE AS NEEDED
Status: DISCONTINUED | OUTPATIENT
Start: 2022-04-30 | End: 2022-05-02 | Stop reason: HOSPADM

## 2022-04-30 RX ORDER — AMLODIPINE BESYLATE 5 MG/1
5 TABLET ORAL DAILY
Status: DISCONTINUED | OUTPATIENT
Start: 2022-04-30 | End: 2022-05-02 | Stop reason: HOSPADM

## 2022-04-30 RX ADMIN — MORPHINE SULFATE 30 MG: 30 TABLET, FILM COATED, EXTENDED RELEASE ORAL at 20:40

## 2022-04-30 RX ADMIN — HEPARIN SODIUM 5000 UNITS: 5000 INJECTION, SOLUTION INTRAVENOUS; SUBCUTANEOUS at 20:40

## 2022-04-30 RX ADMIN — Medication 10 ML: at 19:51

## 2022-04-30 RX ADMIN — AMLODIPINE BESYLATE 5 MG: 5 TABLET ORAL at 20:41

## 2022-04-30 RX ADMIN — SODIUM CHLORIDE 100 ML/HR: 9 INJECTION, SOLUTION INTRAVENOUS at 19:51

## 2022-04-30 RX ADMIN — PREGABALIN 150 MG: 75 CAPSULE ORAL at 20:40

## 2022-04-30 RX ADMIN — MORPHINE SULFATE 15 MG: 15 TABLET ORAL at 23:47

## 2022-04-30 RX ADMIN — SODIUM CHLORIDE 1000 ML: 9 INJECTION, SOLUTION INTRAVENOUS at 16:26

## 2022-05-01 LAB
ANION GAP SERPL CALCULATED.3IONS-SCNC: 12 MMOL/L (ref 5–15)
BASOPHILS # BLD AUTO: 0 10*3/MM3 (ref 0–0.2)
BASOPHILS NFR BLD AUTO: 0.4 % (ref 0–1.5)
BUN SERPL-MCNC: 16 MG/DL (ref 6–20)
BUN/CREAT SERPL: 19 (ref 7–25)
CALCIUM SPEC-SCNC: 8.8 MG/DL (ref 8.6–10.5)
CHLORIDE SERPL-SCNC: 103 MMOL/L (ref 98–107)
CO2 SERPL-SCNC: 24 MMOL/L (ref 22–29)
CREAT SERPL-MCNC: 0.84 MG/DL (ref 0.76–1.27)
DEPRECATED RDW RBC AUTO: 48.6 FL (ref 37–54)
EGFRCR SERPLBLD CKD-EPI 2021: 110.3 ML/MIN/1.73
EOSINOPHIL # BLD AUTO: 0.1 10*3/MM3 (ref 0–0.4)
EOSINOPHIL NFR BLD AUTO: 1.1 % (ref 0.3–6.2)
ERYTHROCYTE [DISTWIDTH] IN BLOOD BY AUTOMATED COUNT: 15.5 % (ref 12.3–15.4)
GLUCOSE SERPL-MCNC: 101 MG/DL (ref 65–99)
HCT VFR BLD AUTO: 40.4 % (ref 37.5–51)
HGB BLD-MCNC: 13.3 G/DL (ref 13–17.7)
LYMPHOCYTES # BLD AUTO: 3.2 10*3/MM3 (ref 0.7–3.1)
LYMPHOCYTES NFR BLD AUTO: 24.5 % (ref 19.6–45.3)
MCH RBC QN AUTO: 29.2 PG (ref 26.6–33)
MCHC RBC AUTO-ENTMCNC: 32.9 G/DL (ref 31.5–35.7)
MCV RBC AUTO: 88.9 FL (ref 79–97)
MONOCYTES # BLD AUTO: 0.8 10*3/MM3 (ref 0.1–0.9)
MONOCYTES NFR BLD AUTO: 6 % (ref 5–12)
NEUTROPHILS NFR BLD AUTO: 68 % (ref 42.7–76)
NEUTROPHILS NFR BLD AUTO: 9 10*3/MM3 (ref 1.7–7)
NRBC BLD AUTO-RTO: 0 /100 WBC (ref 0–0.2)
PLATELET # BLD AUTO: 176 10*3/MM3 (ref 140–450)
PMV BLD AUTO: 8.9 FL (ref 6–12)
POTASSIUM SERPL-SCNC: 4.1 MMOL/L (ref 3.5–5.2)
RBC # BLD AUTO: 4.55 10*6/MM3 (ref 4.14–5.8)
SODIUM SERPL-SCNC: 139 MMOL/L (ref 136–145)
WBC NRBC COR # BLD: 13.2 10*3/MM3 (ref 3.4–10.8)

## 2022-05-01 PROCEDURE — 85025 COMPLETE CBC W/AUTO DIFF WBC: CPT | Performed by: HOSPITALIST

## 2022-05-01 PROCEDURE — 96361 HYDRATE IV INFUSION ADD-ON: CPT

## 2022-05-01 PROCEDURE — 25010000002 HEPARIN (PORCINE) PER 1000 UNITS: Performed by: HOSPITALIST

## 2022-05-01 PROCEDURE — 80048 BASIC METABOLIC PNL TOTAL CA: CPT | Performed by: HOSPITALIST

## 2022-05-01 PROCEDURE — G0378 HOSPITAL OBSERVATION PER HR: HCPCS

## 2022-05-01 PROCEDURE — 99225 PR SBSQ OBSERVATION CARE/DAY 25 MINUTES: CPT | Performed by: HOSPITALIST

## 2022-05-01 PROCEDURE — 96360 HYDRATION IV INFUSION INIT: CPT

## 2022-05-01 PROCEDURE — 96372 THER/PROPH/DIAG INJ SC/IM: CPT

## 2022-05-01 RX ORDER — BACLOFEN 10 MG/1
20 TABLET ORAL 3 TIMES DAILY
Status: DISCONTINUED | OUTPATIENT
Start: 2022-05-01 | End: 2022-05-02 | Stop reason: HOSPADM

## 2022-05-01 RX ORDER — MORPHINE SULFATE 30 MG/1
60 TABLET, FILM COATED, EXTENDED RELEASE ORAL 3 TIMES DAILY
Status: DISCONTINUED | OUTPATIENT
Start: 2022-05-01 | End: 2022-05-02 | Stop reason: HOSPADM

## 2022-05-01 RX ADMIN — MORPHINE SULFATE 15 MG: 15 TABLET ORAL at 23:37

## 2022-05-01 RX ADMIN — PREGABALIN 150 MG: 75 CAPSULE ORAL at 08:34

## 2022-05-01 RX ADMIN — Medication 10 ML: at 08:34

## 2022-05-01 RX ADMIN — HEPARIN SODIUM 5000 UNITS: 5000 INJECTION, SOLUTION INTRAVENOUS; SUBCUTANEOUS at 21:11

## 2022-05-01 RX ADMIN — PREGABALIN 150 MG: 75 CAPSULE ORAL at 16:02

## 2022-05-01 RX ADMIN — PREGABALIN 150 MG: 75 CAPSULE ORAL at 21:11

## 2022-05-01 RX ADMIN — BACLOFEN 20 MG: 10 TABLET ORAL at 16:03

## 2022-05-01 RX ADMIN — LISINOPRIL 10 MG: 5 TABLET ORAL at 08:34

## 2022-05-01 RX ADMIN — MORPHINE SULFATE 60 MG: 30 TABLET, FILM COATED, EXTENDED RELEASE ORAL at 10:17

## 2022-05-01 RX ADMIN — MORPHINE SULFATE 15 MG: 15 TABLET ORAL at 16:49

## 2022-05-01 RX ADMIN — MORPHINE SULFATE 60 MG: 30 TABLET, FILM COATED, EXTENDED RELEASE ORAL at 16:02

## 2022-05-01 RX ADMIN — Medication 10 ML: at 21:11

## 2022-05-01 RX ADMIN — BACLOFEN 20 MG: 10 TABLET ORAL at 21:11

## 2022-05-01 RX ADMIN — MORPHINE SULFATE 60 MG: 30 TABLET, FILM COATED, EXTENDED RELEASE ORAL at 21:12

## 2022-05-01 RX ADMIN — AMLODIPINE BESYLATE 5 MG: 5 TABLET ORAL at 08:34

## 2022-05-01 RX ADMIN — MORPHINE SULFATE 15 MG: 15 TABLET ORAL at 08:43

## 2022-05-01 RX ADMIN — BACLOFEN 20 MG: 10 TABLET ORAL at 10:17

## 2022-05-01 RX ADMIN — SODIUM CHLORIDE 100 ML/HR: 9 INJECTION, SOLUTION INTRAVENOUS at 16:49

## 2022-05-01 RX ADMIN — HEPARIN SODIUM 5000 UNITS: 5000 INJECTION, SOLUTION INTRAVENOUS; SUBCUTANEOUS at 08:34

## 2022-05-01 NOTE — PROGRESS NOTES
[facility] Medicine Services Daily Progress Note    Patient Name: Jorge Luis Nnuo  : 1975  MRN: [id number]  Primary Care Physician:  Tyrone Kendrick MD  Date of admission: 2022      Subjective      Chief Complaint: Dizziness    Subjective  Patient Reports feeling better with dizziness, but still there.  No other active complaint.    Review of Systems   All other systems reviewed and are negative.       Objective      Vitals:   Temp:  [97.7 °F (36.5 °C)-98.2 °F (36.8 °C)] 98.2 °F (36.8 °C)  Heart Rate:  [] 100  Resp:  [17-32] 18  BP: ()/(54-84) 103/74  Flow (L/min):  [2-3] 3    Physical Exam  Vitals and nursing note reviewed.   Constitutional:       General: He is not in acute distress.     Appearance: Normal appearance. He is well-developed. He is not ill-appearing, toxic-appearing or diaphoretic.   HENT:      Head: Normocephalic and atraumatic.      Right Ear: Ear canal and external ear normal.      Left Ear: Ear canal and external ear normal.      Nose: Nose normal. No congestion or rhinorrhea.      Mouth/Throat:      Mouth: Mucous membranes are moist.      Pharynx: No oropharyngeal exudate.   Eyes:      General: No scleral icterus.        Right eye: No discharge.         Left eye: No discharge.      Extraocular Movements: Extraocular movements intact.      Conjunctiva/sclera: Conjunctivae normal.      Pupils: Pupils are equal, round, and reactive to light.   Neck:      Thyroid: No thyromegaly.      Vascular: No carotid bruit or JVD.      Trachea: No tracheal deviation.   Cardiovascular:      Rate and Rhythm: Normal rate and regular rhythm.      Pulses: Normal pulses.      Heart sounds: Normal heart sounds. No murmur heard.    No friction rub. No gallop.   Pulmonary:      Effort: Pulmonary effort is normal. No respiratory distress.      Breath sounds: Normal breath sounds. No stridor. No wheezing, rhonchi or rales.   Chest:      Chest wall: No tenderness.   Abdominal:       General: Bowel sounds are normal. There is no distension.      Palpations: Abdomen is soft. There is no mass.      Tenderness: There is no abdominal tenderness. There is no guarding or rebound.      Hernia: No hernia is present.   Musculoskeletal:         General: No swelling, tenderness, deformity or signs of injury. Normal range of motion.      Cervical back: Normal range of motion and neck supple. No rigidity. No muscular tenderness.      Right lower leg: No edema.      Left lower leg: No edema.   Lymphadenopathy:      Cervical: No cervical adenopathy.   Skin:     General: Skin is warm and dry.      Coloration: Skin is not jaundiced or pale.      Findings: No bruising, erythema or rash.   Neurological:      General: No focal deficit present.      Mental Status: He is alert and oriented to person, place, and time. Mental status is at baseline.      Cranial Nerves: No cranial nerve deficit.      Sensory: No sensory deficit.      Motor: No weakness or abnormal muscle tone.      Coordination: Coordination normal.   Psychiatric:         Mood and Affect: Mood normal.         Behavior: Behavior normal.         Thought Content: Thought content normal.         Judgment: Judgment normal.                 Result Review    Result Review:  I have personally reviewed the results from the time of this admission to 5/1/2022 14:56 EDT and agree with these findings:  []  Laboratory  []  Microbiology  []  Radiology  []  EKG/Telemetry   []  Cardiology/Vascular   []  Pathology  []  Old records  []  Other:  Most notable findings include:           Assessment/Plan      Brief Patient Summary:  Jorge Luis Nuno is a 46 y.o. male wh      amLODIPine, 5 mg, Oral, Daily  baclofen, 20 mg, Oral, TID  heparin (porcine), 5,000 Units, Subcutaneous, Q12H  lisinopril, 10 mg, Oral, Daily  Morphine, 60 mg, Oral, TID  pregabalin, 150 mg, Oral, TID  sodium chloride, 10 mL, Intravenous, Q12H       sodium chloride, 100 mL/hr, Last Rate: 100 mL/hr  (05/01/22 7952)         Active Hospital Problems:  Active Hospital Problems    Diagnosis    • Dizziness    • Orthostasis    • Depression    • Hypertension    • Migraine      Plan:     Orthostatic hypotension secondary to dehydration, will check orthostatics,..... iv fluids, monitor BP, anticipate discharge in next 24 hours based on clinical progress.    History of recent MS flare as per patient ..... Outpatient follow-up with neurology service.    Hypertension,.... continue home regimen, monitor vitals.    Neuropathy ...... noted on lyrica.    Dvt prophylaxis with SCDs.          DVT prophylaxis:  Medical DVT prophylaxis orders are present.      DVT prophylaxis:  Medical DVT prophylaxis orders are present.    CODE STATUS:    Level Of Support Discussed With: Patient  Code Status (Patient has no pulse and is not breathing): CPR (Attempt to Resuscitate)  Medical Interventions (Patient has pulse or is breathing): Full Support      Disposition:  I expect patient to be discharged observation.    This patient has been examined wearing appropriate Personal Protective Equipment and discussed with hospital infection control department. 05/01/22      Electronically signed by Naveen Kimble MD, 05/01/22, 14:56 EDT.  [facility]ist Team

## 2022-05-01 NOTE — PLAN OF CARE
Goal Outcome Evaluation:           Progress: no change  Outcome Evaluation: Patient with complaints of chronic pain today- see MAR. Orthostatics obtained. IV fluids infusing. Pt weaned off oxygen. No further complaints at this time

## 2022-05-01 NOTE — PLAN OF CARE
Goal Outcome Evaluation:  Plan of Care Reviewed With: patient        Progress: no change  Outcome Evaluation: Patient came in for dizziness. Patient's BP is on the lower side. IV fluids been running all night. Patient has MS and is on MS Contin for pain control.

## 2022-05-01 NOTE — NURSING NOTE
Patient's med rec was completed after Dr. Kimble ordered his home meds. I placed a call to on-call MD and he said to put a 1 time dose of 30mg of MS Contin for tonight and d/c the other order. He said to have Dr. Kimble look at it tomorrow.

## 2022-05-02 VITALS
BODY MASS INDEX: 37.22 KG/M2 | HEIGHT: 69 IN | OXYGEN SATURATION: 96 % | TEMPERATURE: 98 F | SYSTOLIC BLOOD PRESSURE: 109 MMHG | RESPIRATION RATE: 17 BRPM | HEART RATE: 65 BPM | DIASTOLIC BLOOD PRESSURE: 77 MMHG | WEIGHT: 251.32 LBS

## 2022-05-02 LAB
ANION GAP SERPL CALCULATED.3IONS-SCNC: 11 MMOL/L (ref 5–15)
BASOPHILS # BLD AUTO: 0 10*3/MM3 (ref 0–0.2)
BASOPHILS NFR BLD AUTO: 0.6 % (ref 0–1.5)
BUN SERPL-MCNC: 9 MG/DL (ref 6–20)
BUN/CREAT SERPL: 11.7 (ref 7–25)
CALCIUM SPEC-SCNC: 8.2 MG/DL (ref 8.6–10.5)
CHLORIDE SERPL-SCNC: 105 MMOL/L (ref 98–107)
CO2 SERPL-SCNC: 25 MMOL/L (ref 22–29)
CREAT SERPL-MCNC: 0.77 MG/DL (ref 0.76–1.27)
DEPRECATED RDW RBC AUTO: 48.1 FL (ref 37–54)
EGFRCR SERPLBLD CKD-EPI 2021: 113.2 ML/MIN/1.73
EOSINOPHIL # BLD AUTO: 0.2 10*3/MM3 (ref 0–0.4)
EOSINOPHIL NFR BLD AUTO: 1.9 % (ref 0.3–6.2)
ERYTHROCYTE [DISTWIDTH] IN BLOOD BY AUTOMATED COUNT: 15.3 % (ref 12.3–15.4)
GLUCOSE SERPL-MCNC: 87 MG/DL (ref 65–99)
HCT VFR BLD AUTO: 37.4 % (ref 37.5–51)
HGB BLD-MCNC: 12.1 G/DL (ref 13–17.7)
LYMPHOCYTES # BLD AUTO: 2 10*3/MM3 (ref 0.7–3.1)
LYMPHOCYTES NFR BLD AUTO: 24.2 % (ref 19.6–45.3)
MCH RBC QN AUTO: 29.2 PG (ref 26.6–33)
MCHC RBC AUTO-ENTMCNC: 32.4 G/DL (ref 31.5–35.7)
MCV RBC AUTO: 89.9 FL (ref 79–97)
MONOCYTES # BLD AUTO: 0.7 10*3/MM3 (ref 0.1–0.9)
MONOCYTES NFR BLD AUTO: 8.9 % (ref 5–12)
NEUTROPHILS NFR BLD AUTO: 5.3 10*3/MM3 (ref 1.7–7)
NEUTROPHILS NFR BLD AUTO: 64.4 % (ref 42.7–76)
NRBC BLD AUTO-RTO: 0 /100 WBC (ref 0–0.2)
PLATELET # BLD AUTO: 139 10*3/MM3 (ref 140–450)
PMV BLD AUTO: 8.8 FL (ref 6–12)
POTASSIUM SERPL-SCNC: 3.7 MMOL/L (ref 3.5–5.2)
RBC # BLD AUTO: 4.16 10*6/MM3 (ref 4.14–5.8)
SODIUM SERPL-SCNC: 141 MMOL/L (ref 136–145)
WBC NRBC COR # BLD: 8.2 10*3/MM3 (ref 3.4–10.8)

## 2022-05-02 PROCEDURE — G0378 HOSPITAL OBSERVATION PER HR: HCPCS

## 2022-05-02 PROCEDURE — 85025 COMPLETE CBC W/AUTO DIFF WBC: CPT | Performed by: HOSPITALIST

## 2022-05-02 PROCEDURE — 80048 BASIC METABOLIC PNL TOTAL CA: CPT | Performed by: HOSPITALIST

## 2022-05-02 PROCEDURE — 96372 THER/PROPH/DIAG INJ SC/IM: CPT

## 2022-05-02 PROCEDURE — 25010000002 HEPARIN (PORCINE) PER 1000 UNITS: Performed by: HOSPITALIST

## 2022-05-02 PROCEDURE — 99217 PR OBSERVATION CARE DISCHARGE MANAGEMENT: CPT | Performed by: HOSPITALIST

## 2022-05-02 PROCEDURE — 96361 HYDRATE IV INFUSION ADD-ON: CPT

## 2022-05-02 RX ADMIN — HEPARIN SODIUM 5000 UNITS: 5000 INJECTION, SOLUTION INTRAVENOUS; SUBCUTANEOUS at 08:36

## 2022-05-02 RX ADMIN — Medication 10 ML: at 08:36

## 2022-05-02 RX ADMIN — PREGABALIN 150 MG: 75 CAPSULE ORAL at 08:35

## 2022-05-02 RX ADMIN — MORPHINE SULFATE 60 MG: 30 TABLET, FILM COATED, EXTENDED RELEASE ORAL at 08:35

## 2022-05-02 RX ADMIN — BACLOFEN 20 MG: 10 TABLET ORAL at 08:35

## 2022-05-02 RX ADMIN — AMLODIPINE BESYLATE 5 MG: 5 TABLET ORAL at 08:35

## 2022-05-02 RX ADMIN — SODIUM CHLORIDE 100 ML/HR: 9 INJECTION, SOLUTION INTRAVENOUS at 03:59

## 2022-05-02 RX ADMIN — LISINOPRIL 10 MG: 5 TABLET ORAL at 08:36

## 2022-05-02 NOTE — CASE MANAGEMENT/SOCIAL WORK
Case Management Discharge Note      Final Note: Pt discharged prior to CM assessment.       Selected Continued Care - Discharged on 5/2/2022 Admission date: 4/30/2022 - Discharge disposition: Home or Self Care     Transportation Services  Private: Car    Final Discharge Disposition Code: 01 - home or self-care

## 2022-05-02 NOTE — DISCHARGE SUMMARY
[facility] Medicine Services  DISCHARGE SUMMARY    Patient Name: Jorge Luis Nuno  : 1975  MRN: [id number]    Date of Admission: 2022  Discharge Diagnosis:   Date of Discharge:  2022  Primary Care Physician: Tyrone Kendrick MD      Presenting Problem:   Dizziness [R42]  Orthostasis [I95.1]    Active and Resolved Hospital Problems:  Active Hospital Problems    Diagnosis POA   • Dizziness [R42] Yes   • Orthostasis [I95.1] Unknown   • Depression [F32.A] Yes   • Hypertension [I10] Yes   • Migraine [G43.909] Yes      Resolved Hospital Problems   No resolved problems to display.         Hospital Course     Hospital Course by problem list.         Orthostatic hypotension secondary to dehydration, patient positive for orthostatic on admission, treated with iv fluids, now orthostatic BP has improved significantly. Pt now will be discharge to follow with PCP in a week time.     History of recent MS flare as per patient ..... Outpatient follow-up with neurology service.     Hypertension,.... continue home regimen, monitor vitals.     Neuropathy ...... noted on lyrica.    Condition on discharge stable.    DISCHARGE Follow Up with PCP in a week time.        Reasons For Change In Medications and Indications for New Medications:      Day of Discharge     Vital Signs:  Temp:  [98 °F (36.7 °C)-98.4 °F (36.9 °C)] 98 °F (36.7 °C)  Heart Rate:  [60-94] 65  Resp:  [17-18] 17  BP: (104-124)/(70-80) 109/77    Physical Exam:  Physical Exam  Vitals and nursing note reviewed.   Constitutional:       General: He is not in acute distress.     Appearance: Normal appearance. He is well-developed. He is not ill-appearing, toxic-appearing or diaphoretic.   HENT:      Head: Normocephalic and atraumatic.      Right Ear: Ear canal and external ear normal.      Left Ear: Ear canal and external ear normal.      Nose: Nose normal. No congestion or rhinorrhea.      Mouth/Throat:      Mouth: Mucous membranes  are moist.      Pharynx: No oropharyngeal exudate.   Eyes:      General: No scleral icterus.        Right eye: No discharge.         Left eye: No discharge.      Extraocular Movements: Extraocular movements intact.      Conjunctiva/sclera: Conjunctivae normal.      Pupils: Pupils are equal, round, and reactive to light.   Neck:      Thyroid: No thyromegaly.      Vascular: No carotid bruit or JVD.      Trachea: No tracheal deviation.   Cardiovascular:      Rate and Rhythm: Normal rate and regular rhythm.      Pulses: Normal pulses.      Heart sounds: Normal heart sounds. No murmur heard.    No friction rub. No gallop.   Pulmonary:      Effort: Pulmonary effort is normal. No respiratory distress.      Breath sounds: Normal breath sounds. No stridor. No wheezing, rhonchi or rales.   Chest:      Chest wall: No tenderness.   Abdominal:      General: Bowel sounds are normal. There is no distension.      Palpations: Abdomen is soft. There is no mass.      Tenderness: There is no abdominal tenderness. There is no guarding or rebound.      Hernia: No hernia is present.   Musculoskeletal:         General: No swelling, tenderness, deformity or signs of injury. Normal range of motion.      Cervical back: Normal range of motion and neck supple. No rigidity. No muscular tenderness.      Right lower leg: No edema.      Left lower leg: No edema.   Lymphadenopathy:      Cervical: No cervical adenopathy.   Skin:     General: Skin is warm and dry.      Coloration: Skin is not jaundiced or pale.      Findings: No bruising, erythema or rash.   Neurological:      General: No focal deficit present.      Mental Status: He is alert and oriented to person, place, and time. Mental status is at baseline.      Cranial Nerves: No cranial nerve deficit.      Sensory: No sensory deficit.      Motor: No weakness or abnormal muscle tone.      Coordination: Coordination normal.   Psychiatric:         Mood and Affect: Mood normal.         Behavior:  Behavior normal.         Thought Content: Thought content normal.         Judgment: Judgment normal.                Pertinent  and/or Most Recent Results     LAB RESULTS:      Lab 05/02/22  0503 05/01/22  0425 04/30/22  1431 04/30/22  1428   WBC 8.20 13.20*  --  17.60*   HEMOGLOBIN 12.1* 13.3  --  14.4   HEMATOCRIT 37.4* 40.4  --  44.4   PLATELETS 139* 176  --  177   NEUTROS ABS 5.30 9.00*  --  14.60*   LYMPHS ABS 2.00 3.20*  --  1.80   MONOS ABS 0.70 0.80  --  1.00*   EOS ABS 0.20 0.10  --  0.00   MCV 89.9 88.9  --  89.1   LACTATE  --   --  1.1  --    PROTIME  --   --   --  10.7   APTT  --   --   --  27.8*         Lab 05/02/22  0503 05/01/22  0425 04/30/22  1428   SODIUM 141 139 137  137   POTASSIUM 3.7 4.1 4.5  4.5   CHLORIDE 105 103 98  98   CO2 25.0 24.0 27.0  27.0   ANION GAP 11.0 12.0 12.0  12.0   BUN 9 16 15  15   CREATININE 0.77 0.84 0.91  0.91   EGFR 113.2 110.3 106.6  106.6   GLUCOSE 87 101* 113*  113*   CALCIUM 8.2* 8.8 9.1  9.1   MAGNESIUM  --   --  2.0         Lab 04/30/22  1428   TOTAL PROTEIN 7.3   ALBUMIN 4.20   GLOBULIN 3.1   ALT (SGPT) 18   AST (SGOT) 12   BILIRUBIN 0.4   ALK PHOS 72         Lab 04/30/22  1428   PROBNP 96.0   TROPONIN T <0.010   PROTIME 10.7   INR 1.04                 Lab 04/30/22  1801   PH, ARTERIAL 7.337*   PCO2, ARTERIAL 53.5*   PO2 .4*   O2 SATURATION ART 98.0   FIO2 28   HCO3 ART 28.6*   BASE EXCESS ART 1.6     Brief Urine Lab Results  (Last result in the past 365 days)      Color   Clarity   Blood   Leuk Est   Nitrite   Protein   CREAT   Urine HCG        04/30/22 1946 Yellow   Clear   Negative   Negative   Negative   Trace               Microbiology Results (last 10 days)     ** No results found for the last 240 hours. **          CT Head Without Contrast    Result Date: 4/30/2022  Impression:  1.Stable, normal exam.  Electronically Signed By-Sofy Fitzgerald MD On:4/30/2022 3:27 PM This report was finalized on [id number] by  Sofy Fitzgerald MD.    XR Chest 1  View    Result Date: 4/30/2022  Impression: 1. Suboptimal inspiration. Otherwise negative exam.  Electronically Signed By-Sofy Fitzgerald MD On:4/30/2022 2:23 PM This report was finalized on [id number] by  Sofy Fitzgerald MD.                  Labs Pending at Discharge:      Procedures Performed           Consults:   Consults     Date and Time Order Name Status Description    4/30/2022  5:02 PM Hospitalist (on-call MD unless specified)              Discharge Details        Discharge Medications      Continue These Medications      Instructions Start Date   amLODIPine 10 MG tablet  Commonly known as: NORVASC   10 mg, Oral, Daily      baclofen 20 MG tablet  Commonly known as: LIORESAL   20 mg, Oral, 3 Times Daily      bethanechol 25 MG tablet  Commonly known as: URECHOLINE   25 mg, Oral, 3 Times Daily      lisinopril 10 MG tablet  Commonly known as: PRINIVIL,ZESTRIL   10 mg, Oral, Daily      meclizine 12.5 MG tablet  Commonly known as: ANTIVERT   12.5 mg, Oral, 3 Times Daily PRN      Morphine 15 MG tablet  Commonly known as: MSIR   15 mg, Oral, Every 6 Hours PRN      Morphine 60 MG 12 hr tablet  Commonly known as: MS CONTIN   30 mg, Oral, 3 Times Daily      pregabalin 150 MG capsule  Commonly known as: LYRICA   150 mg, Oral, 3 Times Daily      rizatriptan 10 MG tablet  Commonly known as: MAXALT   10 mg, Oral, Once As Needed, May repeat in 2 hours if needed              No Known Allergies      Discharge Disposition:   Home or Self Care    Diet:  Hospital:  Diet Order   Procedures   • Diet Cardiac, Diabetic/Consistent Carbs; Healthy Heart; Diabetic - Consistent Carb         Discharge Activity:         CODE STATUS:  Code Status and Medical Interventions:   Ordered at: 04/30/22 1124     Level Of Support Discussed With:    Patient     Code Status (Patient has no pulse and is not breathing):    CPR (Attempt to Resuscitate)     Medical Interventions (Patient has pulse or is breathing):    Full Support         Future  Appointments   Date Time Provider Department Center   5/13/2022  2:15 PM Yg Elliott MD [facility]       Additional Instructions for the Follow-ups that You Need to Schedule     Discharge Follow-up with PCP   As directed       Currently Documented PCP:    Tyrone Kendrick MD    PCP Phone Number:    [phone]     Follow Up Details: one week time.               Time spent on Discharge including face to face service 25 minutes    This patient has been examined wearing appropriate Personal Protective Equipment and discussed with hospital infection control department. 05/02/22      Signature:

## 2022-05-02 NOTE — PLAN OF CARE
Goal Outcome Evaluation:            Pt rested well during the night.  Will continue to monitor pt status.

## 2022-05-06 LAB — QT INTERVAL: 313 MS

## 2022-05-13 ENCOUNTER — OFFICE VISIT (OUTPATIENT)
Dept: PAIN MEDICINE | Facility: CLINIC | Age: 47
End: 2022-05-13

## 2022-05-13 VITALS
RESPIRATION RATE: 16 BRPM | TEMPERATURE: 96 F | DIASTOLIC BLOOD PRESSURE: 74 MMHG | HEART RATE: 135 BPM | BODY MASS INDEX: 37.18 KG/M2 | HEIGHT: 69 IN | SYSTOLIC BLOOD PRESSURE: 106 MMHG | WEIGHT: 251 LBS | OXYGEN SATURATION: 96 %

## 2022-05-13 DIAGNOSIS — Z79.899 ENCOUNTER FOR LONG-TERM (CURRENT) USE OF OTHER MEDICATIONS: Primary | ICD-10-CM

## 2022-05-13 DIAGNOSIS — G89.4 CHRONIC PAIN SYNDROME: ICD-10-CM

## 2022-05-13 PROCEDURE — 99204 OFFICE O/P NEW MOD 45 MIN: CPT | Performed by: STUDENT IN AN ORGANIZED HEALTH CARE EDUCATION/TRAINING PROGRAM

## 2022-05-13 PROCEDURE — G0463 HOSPITAL OUTPT CLINIC VISIT: HCPCS | Performed by: STUDENT IN AN ORGANIZED HEALTH CARE EDUCATION/TRAINING PROGRAM

## 2022-05-13 RX ORDER — BACLOFEN 20 MG/1
20 TABLET ORAL EVERY 6 HOURS PRN
Qty: 120 TABLET | Refills: 0 | Status: SHIPPED | OUTPATIENT
Start: 2022-05-13 | End: 2022-06-09 | Stop reason: SDUPTHER

## 2022-05-13 RX ORDER — MORPHINE SULFATE 15 MG/1
15 TABLET ORAL 3 TIMES DAILY PRN
Qty: 90 TABLET | Refills: 0 | Status: SHIPPED | OUTPATIENT
Start: 2022-05-13 | End: 2022-06-09 | Stop reason: SDUPTHER

## 2022-05-13 RX ORDER — MORPHINE SULFATE 15 MG/1
15 TABLET, FILM COATED, EXTENDED RELEASE ORAL 3 TIMES DAILY PRN
Qty: 90 TABLET | Refills: 0 | Status: SHIPPED | OUTPATIENT
Start: 2022-05-13 | End: 2022-06-09 | Stop reason: SDUPTHER

## 2022-05-13 RX ORDER — PREGABALIN 300 MG/1
300 CAPSULE ORAL 2 TIMES DAILY
Qty: 60 CAPSULE | Refills: 0 | Status: SHIPPED | OUTPATIENT
Start: 2022-05-13 | End: 2022-06-09 | Stop reason: SDUPTHER

## 2022-05-13 RX ORDER — MELOXICAM 15 MG/1
15 TABLET ORAL DAILY
Qty: 30 TABLET | Refills: 0 | Status: SHIPPED | OUTPATIENT
Start: 2022-05-13 | End: 2022-06-09 | Stop reason: SDUPTHER

## 2022-05-13 NOTE — PROGRESS NOTES
CHIEF COMPLAINT  Chief Complaint   Patient presents with   • Foot Pain     Morphine LD 5/13/22       Primary Care  Tyrone Kendrick MD    Subjective   Jorge Luis Nuno is a 46 y.o. male  who presents for chronic pain syndrome and multiple sclerosis pain.  He states that he has had pain ongoing for many years.  He initially suffered a fall approximately 20 years ago where he landed on his feet requiring multiple plates and screws to reconstruct the bottoms of his feet.  He states that since that time, he has been on chronic narcotics.  He states that after his diagnosis with MS, his narcotic dosage was escalated.  It appears that he had continual narcotic escalation to the point where he was on extremely high-dose narcotics.  At the present time, he is taking 30 mg of MS Contin 3 times daily as well as 15 mg of immediate release morphine 5 times daily.  He was also taking baclofen 20 mg 4 times daily and Lyrica 150 mg 3 times daily.  He states he gets moderate relief with this regimen.  He describes pain in both feet as well as generalized left-sided body pains consistent with his MS.    History of Present Illness     Location: Left-sided body pains, bilateral foot pain  Onset: 20 years ago  Duration: Progressively worsening  Timing: Constant throughout the day  Quality: Burning sharp, stabbing  Severity: Today: 6       Last Week: 6       Worst: 10  Modifying Factors: The pain is fairly consistent without any significant exacerbating relieving factors  Functional Deficit: The pain makes it difficult for him to walk and perform his activities of daily living    Physical Therapy: no    Interval Update 05/13/2022:     The following portions of the patient's history were reviewed and updated as appropriate: allergies, current medications, past family history, past medical history, past social history, past surgical history and problem list.      Current Outpatient Medications:   •  amLODIPine (NORVASC) 10 MG tablet, Take 10  "mg by mouth Daily., Disp: , Rfl:   •  baclofen (LIORESAL) 20 MG tablet, Take 1 tablet by mouth Every 6 (Six) Hours As Needed for Muscle Spasms., Disp: 120 tablet, Rfl: 0  •  bethanechol (URECHOLINE) 25 MG tablet, Take 25 mg by mouth 3 (Three) Times a Day., Disp: , Rfl:   •  Morphine (MSIR) 15 MG tablet, Take 1 tablet by mouth 3 (Three) Times a Day As Needed for Severe Pain ., Disp: 90 tablet, Rfl: 0  •  pregabalin (LYRICA) 300 MG capsule, Take 1 capsule by mouth 2 (Two) Times a Day., Disp: 60 capsule, Rfl: 0  •  lisinopril (PRINIVIL,ZESTRIL) 10 MG tablet, Take 10 mg by mouth Daily., Disp: , Rfl:   •  meclizine (ANTIVERT) 12.5 MG tablet, Take 12.5 mg by mouth 3 (Three) Times a Day As Needed for Dizziness., Disp: , Rfl:   •  meloxicam (MOBIC) 15 MG tablet, Take 1 tablet by mouth Daily., Disp: 30 tablet, Rfl: 0  •  Morphine (MS CONTIN) 15 MG 12 hr tablet, Take 1 tablet by mouth 3 (Three) Times a Day As Needed for Severe Pain ., Disp: 90 tablet, Rfl: 0  •  rizatriptan (MAXALT) 10 MG tablet, Take 10 mg by mouth 1 (One) Time As Needed for Migraine. May repeat in 2 hours if needed, Disp: , Rfl:     Review of Systems   Constitutional: Positive for activity change.   Musculoskeletal: Positive for arthralgias, back pain and gait problem.   Neurological: Positive for numbness. Negative for weakness.       Vitals:    05/13/22 1427   BP: 106/74   Pulse: (!) 135   Resp: 16   Temp: 96 °F (35.6 °C)   SpO2: 96%   Weight: 114 kg (251 lb)   Height: 175.3 cm (69\")   PainSc:   6       Urine Drug Screen: 5/13/2022  Appropriate: Pending    Objective   Physical Exam  Vitals and nursing note reviewed.   Constitutional:       General: He is not in acute distress.     Appearance: Normal appearance. He is obese.   Musculoskeletal:         General: Tenderness present. No swelling or deformity. Normal range of motion.   Skin:     General: Skin is warm and dry.   Neurological:      General: No focal deficit present.      Mental Status: He is " alert.      Sensory: No sensory deficit.      Motor: No weakness.           Assessment & Plan   Problems Addressed this Visit        Other    Chronic pain    Relevant Medications    pregabalin (LYRICA) 300 MG capsule    Morphine (MS CONTIN) 15 MG 12 hr tablet    Morphine (MSIR) 15 MG tablet      Other Visit Diagnoses     Encounter for long-term (current) use of other medications    -  Primary    Relevant Orders    Oral Fluid Drug Screen - Saliva, Oral Cavity      Diagnoses       Codes Comments    Encounter for long-term (current) use of other medications    -  Primary ICD-10-CM: Z79.899  ICD-9-CM: V58.69     Chronic pain syndrome     ICD-10-CM: G89.4  ICD-9-CM: 338.4           Plan:  1. Unfortunately, he ended up on extremely high doses of narcotic likely very inappropriately.  At this time, he is taking approximately 165 MME's daily in addition to very high-dose baclofen and pregabalin.  Given that he has a diagnosis of MS, it is likely that he has a significant component of central pain which would not be treated with traditional narcotics.  I had a long discussion with him regarding the effects of such high-dose narcotics and the importance of weaning down his narcotics and getting him on more appropriate therapies.  2. The patient is agreement and understands that his narcotic dosages will be weaned down.  3. We will switch MS Contin 30 mg 3 times daily to 50 mg 3 times daily  4. Decrease immediate release morphine to 50 mg 3 times daily  5. Increase Lyrica to 300 mg twice daily  6. Continue baclofen 20 mg 4 times daily  7. UDS and contract today.  Inspect reviewed  --- Follow-up 1 month           INSPECT REPORT    As part of the patient's treatment plan, I may be prescribing controlled substances. The patient has been made aware of appropriate use of such medications, including potential risk of somnolence, limited ability to drive and/or work safely, and the potential for dependence or overdose. It has also  bee made clear that these medications are for use by this patient only, without concomitant use of alcohol or other substances unless prescribed.     Patient has completed prescribing agreement detailing terms of continued prescribing of controlled substances, including monitoring [employer] reports, urine drug screening, and pill counts if necessary. The patient is aware that inappropriate use will results in cessation of prescribing such medications.    INSPECT report has been reviewed and scanned into the patient's chart.    As the clinician, I personally reviewed the INSPECT from 5/12/2020.    History and physical exam exhibit continued safe and appropriate use of controlled substances.      EMR Dragon/Transcription disclaimer:   Much of this encounter note is an electronic transcription/translation of spoken language to printed text. The electronic translation of spoken language may permit erroneous, or at times, nonsensical words or phrases to be inadvertently transcribed; Although I have reviewed the note for such errors, some may still exist.

## 2022-06-09 ENCOUNTER — OFFICE VISIT (OUTPATIENT)
Dept: PAIN MEDICINE | Facility: CLINIC | Age: 47
End: 2022-06-09

## 2022-06-09 VITALS
SYSTOLIC BLOOD PRESSURE: 138 MMHG | BODY MASS INDEX: 37.18 KG/M2 | HEIGHT: 69 IN | WEIGHT: 251 LBS | HEART RATE: 117 BPM | OXYGEN SATURATION: 96 % | TEMPERATURE: 97.1 F | RESPIRATION RATE: 16 BRPM | DIASTOLIC BLOOD PRESSURE: 90 MMHG

## 2022-06-09 DIAGNOSIS — G89.4 CHRONIC PAIN SYNDROME: Primary | ICD-10-CM

## 2022-06-09 PROCEDURE — G0463 HOSPITAL OUTPT CLINIC VISIT: HCPCS | Performed by: STUDENT IN AN ORGANIZED HEALTH CARE EDUCATION/TRAINING PROGRAM

## 2022-06-09 PROCEDURE — 99214 OFFICE O/P EST MOD 30 MIN: CPT | Performed by: STUDENT IN AN ORGANIZED HEALTH CARE EDUCATION/TRAINING PROGRAM

## 2022-06-09 RX ORDER — BACLOFEN 20 MG/1
20 TABLET ORAL EVERY 6 HOURS PRN
Qty: 120 TABLET | Refills: 1 | Status: SHIPPED | OUTPATIENT
Start: 2022-06-09 | End: 2022-07-11 | Stop reason: SDUPTHER

## 2022-06-09 RX ORDER — MELOXICAM 15 MG/1
15 TABLET ORAL DAILY
Qty: 30 TABLET | Refills: 1 | Status: SHIPPED | OUTPATIENT
Start: 2022-06-09 | End: 2022-08-10 | Stop reason: SDUPTHER

## 2022-06-09 RX ORDER — MORPHINE SULFATE 15 MG/1
15 TABLET ORAL 3 TIMES DAILY PRN
Qty: 90 TABLET | Refills: 0 | Status: SHIPPED | OUTPATIENT
Start: 2022-06-16 | End: 2022-08-12 | Stop reason: SDUPTHER

## 2022-06-09 RX ORDER — PREGABALIN 300 MG/1
300 CAPSULE ORAL 2 TIMES DAILY
Qty: 60 CAPSULE | Refills: 1 | Status: SHIPPED | OUTPATIENT
Start: 2022-06-09 | End: 2022-08-12 | Stop reason: SDUPTHER

## 2022-06-09 RX ORDER — CYCLOBENZAPRINE HCL 10 MG
10 TABLET ORAL 3 TIMES DAILY
Qty: 90 TABLET | Refills: 1 | Status: SHIPPED | OUTPATIENT
Start: 2022-06-09 | End: 2023-01-02

## 2022-06-09 RX ORDER — MORPHINE SULFATE 15 MG/1
15 TABLET, FILM COATED, EXTENDED RELEASE ORAL 3 TIMES DAILY
Qty: 90 TABLET | Refills: 0 | Status: SHIPPED | OUTPATIENT
Start: 2022-07-15 | End: 2022-08-12 | Stop reason: SDUPTHER

## 2022-06-09 RX ORDER — MORPHINE SULFATE 15 MG/1
15 TABLET, FILM COATED, EXTENDED RELEASE ORAL 3 TIMES DAILY PRN
Qty: 90 TABLET | Refills: 0 | Status: SHIPPED | OUTPATIENT
Start: 2022-06-16 | End: 2022-08-12 | Stop reason: SDUPTHER

## 2022-06-09 RX ORDER — MORPHINE SULFATE 15 MG/1
15 TABLET ORAL 3 TIMES DAILY PRN
Qty: 90 TABLET | Refills: 0 | Status: SHIPPED | OUTPATIENT
Start: 2022-07-15 | End: 2022-08-12 | Stop reason: SDUPTHER

## 2022-06-09 NOTE — PROGRESS NOTES
CHIEF COMPLAINT  Chief Complaint   Patient presents with   • Foot Pain     Morphine LD 6/9/22 @ 1200       Primary Care  Tyrone Kendrick MD    Subjective   Jorge Luis Nuno is a 47 y.o. male  who presents for chronic pain syndrome and multiple sclerosis pain.  He states that he has had pain ongoing for many years.  He initially suffered a fall approximately 20 years ago where he landed on his feet requiring multiple plates and screws to reconstruct the bottoms of his feet.  He states that since that time, he has been on chronic narcotics.  He states that after his diagnosis with MS, his narcotic dosage was escalated.  It appears that he had continual narcotic escalation to the point where he was on extremely high-dose narcotics.  At the present time, he is taking 30 mg of MS Contin 3 times daily as well as 15 mg of immediate release morphine 5 times daily.  He was also taking baclofen 20 mg 4 times daily and Lyrica 150 mg 3 times daily.  He states he gets moderate relief with this regimen.  He describes pain in both feet as well as generalized left-sided body pains consistent with his MS.    Foot Pain  Associated symptoms include arthralgias and numbness. Pertinent negatives include no weakness.        Location: Left-sided body pains, bilateral foot pain  Onset: 20 years ago  Duration: Progressively worsening  Timing: Constant throughout the day  Quality: Burning sharp, stabbing  Severity: Today: 6       Last Week: 6       Worst: 10  Modifying Factors: The pain is fairly consistent without any significant exacerbating relieving factors  Functional Deficit: The pain makes it difficult for him to walk and perform his activities of daily living    Physical Therapy: no    Interval Update 06/09/2022: He reports no significant change in his pain levels despite significant decrease in his narcotic burden.  He is complaining of some worsening spasticity and stiffness, but states that he feels it is likely related to an MS  exacerbation.  He denies constipation or sedation.  He states that he is going to start a new MS medication in the next few weeks.    The following portions of the patient's history were reviewed and updated as appropriate: allergies, current medications, past family history, past medical history, past social history, past surgical history and problem list.      Current Outpatient Medications:   •  amLODIPine (NORVASC) 10 MG tablet, Take 10 mg by mouth Daily., Disp: , Rfl:   •  baclofen (LIORESAL) 20 MG tablet, Take 1 tablet by mouth Every 6 (Six) Hours As Needed for Muscle Spasms., Disp: 120 tablet, Rfl: 1  •  bethanechol (URECHOLINE) 25 MG tablet, Take 25 mg by mouth 3 (Three) Times a Day., Disp: , Rfl:   •  lisinopril (PRINIVIL,ZESTRIL) 10 MG tablet, Take 10 mg by mouth Daily., Disp: , Rfl:   •  meclizine (ANTIVERT) 12.5 MG tablet, Take 12.5 mg by mouth 3 (Three) Times a Day As Needed for Dizziness., Disp: , Rfl:   •  meloxicam (MOBIC) 15 MG tablet, Take 1 tablet by mouth Daily., Disp: 30 tablet, Rfl: 1  •  [START ON 6/16/2022] Morphine (MS CONTIN) 15 MG 12 hr tablet, Take 1 tablet by mouth 3 (Three) Times a Day As Needed for Severe Pain ., Disp: 90 tablet, Rfl: 0  •  [START ON 6/16/2022] Morphine (MSIR) 15 MG tablet, Take 1 tablet by mouth 3 (Three) Times a Day As Needed for Severe Pain ., Disp: 90 tablet, Rfl: 0  •  pregabalin (LYRICA) 300 MG capsule, Take 1 capsule by mouth 2 (Two) Times a Day., Disp: 60 capsule, Rfl: 1  •  rizatriptan (MAXALT) 10 MG tablet, Take 10 mg by mouth 1 (One) Time As Needed for Migraine. May repeat in 2 hours if needed, Disp: , Rfl:   •  cyclobenzaprine (FLEXERIL) 10 MG tablet, Take 1 tablet by mouth 3 (Three) Times a Day., Disp: 90 tablet, Rfl: 1  •  [START ON 7/15/2022] Morphine (MS CONTIN) 15 MG 12 hr tablet, Take 1 tablet by mouth 3 (Three) Times a Day., Disp: 90 tablet, Rfl: 0  •  [START ON 7/15/2022] Morphine (MSIR) 15 MG tablet, Take 1 tablet by mouth 3 (Three) Times a Day As  "Needed for Severe Pain ., Disp: 90 tablet, Rfl: 0    Review of Systems   Constitutional: Positive for activity change.   Musculoskeletal: Positive for arthralgias, back pain and gait problem.   Neurological: Positive for numbness. Negative for weakness.       Vitals:    06/09/22 1340   BP: 138/90   Pulse: 117   Resp: 16   Temp: 97.1 °F (36.2 °C)   SpO2: 96%   Weight: 114 kg (251 lb)   Height: 175.3 cm (69\")   PainSc:   7       Urine Drug Screen: 5/13/2022  Appropriate: Yes    Objective   Physical Exam  Vitals and nursing note reviewed.   Constitutional:       General: He is not in acute distress.     Appearance: Normal appearance. He is obese.   Musculoskeletal:         General: Tenderness present. No swelling or deformity. Normal range of motion.   Skin:     General: Skin is warm and dry.   Neurological:      General: No focal deficit present.      Mental Status: He is alert.      Sensory: No sensory deficit.      Motor: No weakness.           Assessment & Plan   Problems Addressed this Visit        Other    Chronic pain - Primary    Relevant Medications    pregabalin (LYRICA) 300 MG capsule    Morphine (MSIR) 15 MG tablet (Start on 6/16/2022)    Morphine (MS CONTIN) 15 MG 12 hr tablet (Start on 6/16/2022)    Morphine (MS CONTIN) 15 MG 12 hr tablet (Start on 7/15/2022)    Morphine (MSIR) 15 MG tablet (Start on 7/15/2022)      Diagnoses       Codes Comments    Chronic pain syndrome    -  Primary ICD-10-CM: G89.4  ICD-9-CM: 338.4           Plan:  1. Continue MS Contin 15 mg 3 times daily as well as morphine 15mg 3 times daily  2. Continue Lyrica 300 mg twice daily  3. Continue baclofen.  We will add cyclobenzaprine for further muscle relaxation  4. Also continue meloxicam  5. UDS and inspect appropriate  --- Follow-up 2 months           INSPECT REPORT    As part of the patient's treatment plan, I may be prescribing controlled substances. The patient has been made aware of appropriate use of such medications, " including potential risk of somnolence, limited ability to drive and/or work safely, and the potential for dependence or overdose. It has also bee made clear that these medications are for use by this patient only, without concomitant use of alcohol or other substances unless prescribed.     Patient has completed prescribing agreement detailing terms of continued prescribing of controlled substances, including monitoring ISIS reports, urine drug screening, and pill counts if necessary. The patient is aware that inappropriate use will results in cessation of prescribing such medications.    INSPECT report has been reviewed and scanned into the patient's chart.    As the clinician, I personally reviewed the INSPECT from 6/6/2022.    History and physical exam exhibit continued safe and appropriate use of controlled substances.      EMR Dragon/Transcription disclaimer:   Much of this encounter note is an electronic transcription/translation of spoken language to printed text. The electronic translation of spoken language may permit erroneous, or at times, nonsensical words or phrases to be inadvertently transcribed; Although I have reviewed the note for such errors, some may still exist.

## 2022-07-11 RX ORDER — BACLOFEN 20 MG/1
20 TABLET ORAL EVERY 6 HOURS PRN
Qty: 120 TABLET | Refills: 1 | Status: SHIPPED | OUTPATIENT
Start: 2022-07-11 | End: 2022-08-12 | Stop reason: SDUPTHER

## 2022-08-10 RX ORDER — MELOXICAM 15 MG/1
15 TABLET ORAL DAILY
Qty: 30 TABLET | Refills: 1 | Status: SHIPPED | OUTPATIENT
Start: 2022-08-10 | End: 2022-08-12 | Stop reason: SDUPTHER

## 2022-08-12 ENCOUNTER — OFFICE VISIT (OUTPATIENT)
Dept: PAIN MEDICINE | Facility: CLINIC | Age: 47
End: 2022-08-12

## 2022-08-12 VITALS
BODY MASS INDEX: 37.18 KG/M2 | RESPIRATION RATE: 16 BRPM | DIASTOLIC BLOOD PRESSURE: 103 MMHG | OXYGEN SATURATION: 96 % | SYSTOLIC BLOOD PRESSURE: 140 MMHG | WEIGHT: 251 LBS | HEART RATE: 130 BPM | HEIGHT: 69 IN

## 2022-08-12 DIAGNOSIS — G89.4 CHRONIC PAIN SYNDROME: ICD-10-CM

## 2022-08-12 PROCEDURE — 99214 OFFICE O/P EST MOD 30 MIN: CPT | Performed by: STUDENT IN AN ORGANIZED HEALTH CARE EDUCATION/TRAINING PROGRAM

## 2022-08-12 PROCEDURE — G0463 HOSPITAL OUTPT CLINIC VISIT: HCPCS | Performed by: STUDENT IN AN ORGANIZED HEALTH CARE EDUCATION/TRAINING PROGRAM

## 2022-08-12 RX ORDER — MELOXICAM 15 MG/1
15 TABLET ORAL DAILY
Qty: 30 TABLET | Refills: 1 | Status: SHIPPED | OUTPATIENT
Start: 2022-08-12 | End: 2023-01-02

## 2022-08-12 RX ORDER — MORPHINE SULFATE 15 MG/1
15 TABLET ORAL 3 TIMES DAILY PRN
Qty: 90 TABLET | Refills: 0 | Status: SHIPPED | OUTPATIENT
Start: 2022-08-13 | End: 2023-01-02

## 2022-08-12 RX ORDER — PREGABALIN 300 MG/1
300 CAPSULE ORAL 2 TIMES DAILY
Qty: 60 CAPSULE | Refills: 1 | Status: SHIPPED | OUTPATIENT
Start: 2022-08-12

## 2022-08-12 RX ORDER — MORPHINE SULFATE 15 MG/1
15 TABLET, FILM COATED, EXTENDED RELEASE ORAL 3 TIMES DAILY
Qty: 90 TABLET | Refills: 0 | Status: SHIPPED | OUTPATIENT
Start: 2022-09-12 | End: 2023-01-02

## 2022-08-12 RX ORDER — BACLOFEN 20 MG/1
20 TABLET ORAL EVERY 6 HOURS PRN
Qty: 120 TABLET | Refills: 1 | Status: SHIPPED | OUTPATIENT
Start: 2022-08-12

## 2022-08-12 RX ORDER — MORPHINE SULFATE 15 MG/1
15 TABLET, FILM COATED, EXTENDED RELEASE ORAL 3 TIMES DAILY PRN
Qty: 90 TABLET | Refills: 0 | Status: SHIPPED | OUTPATIENT
Start: 2022-08-13 | End: 2023-01-02

## 2022-08-12 RX ORDER — MORPHINE SULFATE 15 MG/1
15 TABLET ORAL 3 TIMES DAILY PRN
Qty: 90 TABLET | Refills: 0 | Status: SHIPPED | OUTPATIENT
Start: 2022-09-12 | End: 2023-01-02

## 2022-08-12 NOTE — PROGRESS NOTES
CHIEF COMPLAINT  Chief Complaint   Patient presents with   • Foot Pain     Morphine LD 8/12/22       Primary Care  Tyrone Kendrick MD    Subjective   Jorge Luis Nuno is a 47 y.o. male  who presents for chronic pain syndrome and multiple sclerosis pain.  He states that he has had pain ongoing for many years.  He initially suffered a fall approximately 20 years ago where he landed on his feet requiring multiple plates and screws to reconstruct the bottoms of his feet.  He states that since that time, he has been on chronic narcotics.  He states that after his diagnosis with MS, his narcotic dosage was escalated.  It appears that he had continual narcotic escalation to the point where he was on extremely high-dose narcotics.  At the present time, he is taking 30 mg of MS Contin 3 times daily as well as 15 mg of immediate release morphine 5 times daily.  He was also taking baclofen 20 mg 4 times daily and Lyrica 150 mg 3 times daily.  He states he gets moderate relief with this regimen.  He describes pain in both feet as well as generalized left-sided body pains consistent with his MS.    Foot Pain  Associated symptoms include arthralgias and numbness. Pertinent negatives include no weakness.        Location: Left-sided body pains, bilateral foot pain  Onset: 20 years ago  Duration: Progressively worsening  Timing: Constant throughout the day  Quality: Burning sharp, stabbing  Severity: Today: 6       Last Week: 6       Worst: 10  Modifying Factors: The pain is fairly consistent without any significant exacerbating relieving factors  Functional Deficit: The pain makes it difficult for him to walk and perform his activities of daily living    Physical Therapy: no    Interval Update 08/12/2022: Overall appears to be doing okay.  He recently started a new MS infusion and states he has not had any significant exacerbations.  Doing well with his current medication.  Denies sedation but does have some constipation.    The  following portions of the patient's history were reviewed and updated as appropriate: allergies, current medications, past family history, past medical history, past social history, past surgical history and problem list.      Current Outpatient Medications:   •  amLODIPine (NORVASC) 10 MG tablet, Take 10 mg by mouth Daily., Disp: , Rfl:   •  baclofen (LIORESAL) 20 MG tablet, Take 1 tablet by mouth Every 6 (Six) Hours As Needed for Muscle Spasms., Disp: 120 tablet, Rfl: 1  •  bethanechol (URECHOLINE) 25 MG tablet, Take 25 mg by mouth 3 (Three) Times a Day., Disp: , Rfl:   •  cyclobenzaprine (FLEXERIL) 10 MG tablet, Take 1 tablet by mouth 3 (Three) Times a Day., Disp: 90 tablet, Rfl: 1  •  lisinopril (PRINIVIL,ZESTRIL) 10 MG tablet, Take 10 mg by mouth Daily., Disp: , Rfl:   •  meclizine (ANTIVERT) 12.5 MG tablet, Take 12.5 mg by mouth 3 (Three) Times a Day As Needed for Dizziness., Disp: , Rfl:   •  meloxicam (MOBIC) 15 MG tablet, Take 1 tablet by mouth Daily., Disp: 30 tablet, Rfl: 1  •  [START ON 9/12/2022] Morphine (MS CONTIN) 15 MG 12 hr tablet, Take 1 tablet by mouth 3 (Three) Times a Day., Disp: 90 tablet, Rfl: 0  •  [START ON 8/13/2022] Morphine (MS CONTIN) 15 MG 12 hr tablet, Take 1 tablet by mouth 3 (Three) Times a Day As Needed for Severe Pain ., Disp: 90 tablet, Rfl: 0  •  [START ON 9/12/2022] Morphine (MSIR) 15 MG tablet, Take 1 tablet by mouth 3 (Three) Times a Day As Needed for Severe Pain ., Disp: 90 tablet, Rfl: 0  •  [START ON 8/13/2022] Morphine (MSIR) 15 MG tablet, Take 1 tablet by mouth 3 (Three) Times a Day As Needed for Severe Pain ., Disp: 90 tablet, Rfl: 0  •  pregabalin (LYRICA) 300 MG capsule, Take 1 capsule by mouth 2 (Two) Times a Day., Disp: 60 capsule, Rfl: 1  •  rizatriptan (MAXALT) 10 MG tablet, Take 10 mg by mouth 1 (One) Time As Needed for Migraine. May repeat in 2 hours if needed, Disp: , Rfl:   •  Methylnaltrexone Bromide (Relistor) 150 MG tablet, Take 3 tablets by mouth Daily.,  "Disp: 90 tablet, Rfl: 1    Review of Systems   Constitutional: Positive for activity change.   Musculoskeletal: Positive for arthralgias, back pain and gait problem.   Neurological: Positive for numbness. Negative for weakness.       Vitals:    08/12/22 1352   BP: (!) 140/103   Pulse: (!) 130   Resp: 16   SpO2: 96%   Weight: 114 kg (251 lb)   Height: 175.3 cm (69\")   PainSc:   6       Urine Drug Screen: 5/13/2022  Appropriate: Yes    Objective   Physical Exam  Vitals and nursing note reviewed.   Constitutional:       General: He is not in acute distress.     Appearance: Normal appearance. He is obese.   Musculoskeletal:         General: Tenderness present. No swelling or deformity. Normal range of motion.   Skin:     General: Skin is warm and dry.   Neurological:      General: No focal deficit present.      Mental Status: He is alert.      Sensory: No sensory deficit.      Motor: No weakness.           Assessment & Plan   Problems Addressed this Visit        Other    Chronic pain    Relevant Medications    pregabalin (LYRICA) 300 MG capsule    Morphine (MS CONTIN) 15 MG 12 hr tablet (Start on 9/12/2022)    Morphine (MS CONTIN) 15 MG 12 hr tablet (Start on 8/13/2022)    Morphine (MSIR) 15 MG tablet (Start on 9/12/2022)    Morphine (MSIR) 15 MG tablet (Start on 8/13/2022)      Diagnoses       Codes Comments    Chronic pain syndrome     ICD-10-CM: G89.4  ICD-9-CM: 338.4           Plan:  1. Continue MS Contin 15 mg 3 times daily as well as morphine 15mg 3 times daily  2. Continue Lyrica 300 mg twice daily  3. Continue baclofen.  We will add cyclobenzaprine for further muscle relaxation  4. Also continue meloxicam  5. UDS and inspect appropriate  6. Rella store 450 mg daily for opioid-induced constipation  --- Follow-up 2 months           INSPECT REPORT    As part of the patient's treatment plan, I may be prescribing controlled substances. The patient has been made aware of appropriate use of such medications, including " potential risk of somnolence, limited ability to drive and/or work safely, and the potential for dependence or overdose. It has also bee made clear that these medications are for use by this patient only, without concomitant use of alcohol or other substances unless prescribed.     Patient has completed prescribing agreement detailing terms of continued prescribing of controlled substances, including monitoring ISIS reports, urine drug screening, and pill counts if necessary. The patient is aware that inappropriate use will results in cessation of prescribing such medications.    INSPECT report has been reviewed and scanned into the patient's chart.    As the clinician, I personally reviewed the INSPECT from 8/11/2022.    History and physical exam exhibit continued safe and appropriate use of controlled substances.      EMR Dragon/Transcription disclaimer:   Much of this encounter note is an electronic transcription/translation of spoken language to printed text. The electronic translation of spoken language may permit erroneous, or at times, nonsensical words or phrases to be inadvertently transcribed; Although I have reviewed the note for such errors, some may still exist.

## 2022-09-22 ENCOUNTER — APPOINTMENT (OUTPATIENT)
Dept: CT IMAGING | Facility: HOSPITAL | Age: 47
End: 2022-09-22

## 2022-09-22 ENCOUNTER — HOSPITAL ENCOUNTER (OUTPATIENT)
Facility: HOSPITAL | Age: 47
Setting detail: OBSERVATION
Discharge: HOME | End: 2022-09-25
Attending: EMERGENCY MEDICINE | Admitting: INTERNAL MEDICINE

## 2022-09-22 ENCOUNTER — APPOINTMENT (OUTPATIENT)
Dept: GENERAL RADIOLOGY | Facility: HOSPITAL | Age: 47
End: 2022-09-22

## 2022-09-22 DIAGNOSIS — R41.82 ALTERED MENTAL STATUS, UNSPECIFIED ALTERED MENTAL STATUS TYPE: Primary | ICD-10-CM

## 2022-09-22 DIAGNOSIS — G35 MS (MULTIPLE SCLEROSIS): ICD-10-CM

## 2022-09-22 DIAGNOSIS — H10.503 BLEPHAROCONJUNCTIVITIS OF BOTH EYES, UNSPECIFIED BLEPHAROCONJUNCTIVITIS TYPE: ICD-10-CM

## 2022-09-22 DIAGNOSIS — R44.3 HALLUCINATIONS: ICD-10-CM

## 2022-09-22 LAB
ALBUMIN SERPL-MCNC: 3.7 G/DL (ref 3.5–5.2)
ALBUMIN/GLOB SERPL: 1.3 G/DL
ALP SERPL-CCNC: 91 U/L (ref 39–117)
ALT SERPL W P-5'-P-CCNC: 15 U/L (ref 1–41)
ANION GAP SERPL CALCULATED.3IONS-SCNC: 10 MMOL/L (ref 5–15)
ARTERIAL PATENCY WRIST A: POSITIVE
AST SERPL-CCNC: 13 U/L (ref 1–40)
ATMOSPHERIC PRESS: ABNORMAL MM[HG]
BASE EXCESS BLDA CALC-SCNC: 2.4 MMOL/L (ref 0–3)
BASOPHILS # BLD AUTO: 0.1 10*3/MM3 (ref 0–0.2)
BASOPHILS NFR BLD AUTO: 0.8 % (ref 0–1.5)
BDY SITE: ABNORMAL
BILIRUB SERPL-MCNC: 0.6 MG/DL (ref 0–1.2)
BILIRUB UR QL STRIP: NEGATIVE
BUN SERPL-MCNC: 10 MG/DL (ref 6–20)
BUN/CREAT SERPL: 7.3 (ref 7–25)
CALCIUM SPEC-SCNC: 9 MG/DL (ref 8.6–10.5)
CHLORIDE SERPL-SCNC: 99 MMOL/L (ref 98–107)
CLARITY UR: CLEAR
CO2 BLDA-SCNC: 29.3 MMOL/L (ref 22–29)
CO2 SERPL-SCNC: 28 MMOL/L (ref 22–29)
COLOR UR: YELLOW
CREAT SERPL-MCNC: 1.37 MG/DL (ref 0.76–1.27)
D-LACTATE SERPL-SCNC: 1.4 MMOL/L (ref 0.5–2)
DEPRECATED RDW RBC AUTO: 44.6 FL (ref 37–54)
EGFRCR SERPLBLD CKD-EPI 2021: 64.8 ML/MIN/1.73
EOSINOPHIL # BLD AUTO: 0.4 10*3/MM3 (ref 0–0.4)
EOSINOPHIL NFR BLD AUTO: 3.5 % (ref 0.3–6.2)
ERYTHROCYTE [DISTWIDTH] IN BLOOD BY AUTOMATED COUNT: 14.8 % (ref 12.3–15.4)
GLOBULIN UR ELPH-MCNC: 2.8 GM/DL
GLUCOSE BLDC GLUCOMTR-MCNC: 112 MG/DL (ref 70–105)
GLUCOSE SERPL-MCNC: 101 MG/DL (ref 65–99)
GLUCOSE UR STRIP-MCNC: NEGATIVE MG/DL
HCO3 BLDA-SCNC: 27.9 MMOL/L (ref 21–28)
HCT VFR BLD AUTO: 41.3 % (ref 37.5–51)
HEMODILUTION: NO
HGB BLD-MCNC: 13.7 G/DL (ref 13–17.7)
HGB UR QL STRIP.AUTO: NEGATIVE
INHALED O2 CONCENTRATION: 21 %
KETONES UR QL STRIP: NEGATIVE
LEUKOCYTE ESTERASE UR QL STRIP.AUTO: NEGATIVE
LYMPHOCYTES # BLD AUTO: 1.5 10*3/MM3 (ref 0.7–3.1)
LYMPHOCYTES NFR BLD AUTO: 14.5 % (ref 19.6–45.3)
MCH RBC QN AUTO: 28.1 PG (ref 26.6–33)
MCHC RBC AUTO-ENTMCNC: 33.2 G/DL (ref 31.5–35.7)
MCV RBC AUTO: 84.6 FL (ref 79–97)
MODALITY: ABNORMAL
MONOCYTES # BLD AUTO: 0.9 10*3/MM3 (ref 0.1–0.9)
MONOCYTES NFR BLD AUTO: 8.5 % (ref 5–12)
NEUTROPHILS NFR BLD AUTO: 7.4 10*3/MM3 (ref 1.7–7)
NEUTROPHILS NFR BLD AUTO: 72.7 % (ref 42.7–76)
NITRITE UR QL STRIP: NEGATIVE
NRBC BLD AUTO-RTO: 0.1 /100 WBC (ref 0–0.2)
PCO2 BLDA: 45.6 MM HG (ref 35–48)
PH BLDA: 7.39 PH UNITS (ref 7.35–7.45)
PH UR STRIP.AUTO: 7 [PH] (ref 5–8)
PLATELET # BLD AUTO: 242 10*3/MM3 (ref 140–450)
PMV BLD AUTO: 8 FL (ref 6–12)
PO2 BLDA: 83.1 MM HG (ref 83–108)
POTASSIUM SERPL-SCNC: 3.9 MMOL/L (ref 3.5–5.2)
PROT SERPL-MCNC: 6.5 G/DL (ref 6–8.5)
PROT UR QL STRIP: NEGATIVE
RBC # BLD AUTO: 4.88 10*6/MM3 (ref 4.14–5.8)
SAO2 % BLDCOA: 96 % (ref 94–98)
SODIUM SERPL-SCNC: 137 MMOL/L (ref 136–145)
SP GR UR STRIP: 1.01 (ref 1–1.03)
UROBILINOGEN UR QL STRIP: NORMAL
WBC NRBC COR # BLD: 10.2 10*3/MM3 (ref 3.4–10.8)

## 2022-09-22 PROCEDURE — 70450 CT HEAD/BRAIN W/O DYE: CPT

## 2022-09-22 PROCEDURE — 25010000002 LORAZEPAM PER 2 MG

## 2022-09-22 PROCEDURE — 81003 URINALYSIS AUTO W/O SCOPE: CPT

## 2022-09-22 PROCEDURE — 80307 DRUG TEST PRSMV CHEM ANLYZR: CPT | Performed by: NURSE PRACTITIONER

## 2022-09-22 PROCEDURE — 80053 COMPREHEN METABOLIC PANEL: CPT

## 2022-09-22 PROCEDURE — 87040 BLOOD CULTURE FOR BACTERIA: CPT

## 2022-09-22 PROCEDURE — 71045 X-RAY EXAM CHEST 1 VIEW: CPT

## 2022-09-22 PROCEDURE — P9612 CATHETERIZE FOR URINE SPEC: HCPCS

## 2022-09-22 PROCEDURE — 96365 THER/PROPH/DIAG IV INF INIT: CPT

## 2022-09-22 PROCEDURE — 96375 TX/PRO/DX INJ NEW DRUG ADDON: CPT

## 2022-09-22 PROCEDURE — 83605 ASSAY OF LACTIC ACID: CPT

## 2022-09-22 PROCEDURE — 85025 COMPLETE CBC W/AUTO DIFF WBC: CPT

## 2022-09-22 PROCEDURE — 25010000002 HALOPERIDOL LACTATE PER 5 MG

## 2022-09-22 PROCEDURE — 96367 TX/PROPH/DG ADDL SEQ IV INF: CPT

## 2022-09-22 PROCEDURE — 25010000002 CEFTRIAXONE PER 250 MG

## 2022-09-22 PROCEDURE — 82962 GLUCOSE BLOOD TEST: CPT

## 2022-09-22 PROCEDURE — 36600 WITHDRAWAL OF ARTERIAL BLOOD: CPT

## 2022-09-22 PROCEDURE — 99285 EMERGENCY DEPT VISIT HI MDM: CPT

## 2022-09-22 PROCEDURE — 96372 THER/PROPH/DIAG INJ SC/IM: CPT

## 2022-09-22 PROCEDURE — 82803 BLOOD GASES ANY COMBINATION: CPT

## 2022-09-22 PROCEDURE — 25010000002 VANCOMYCIN 10 G RECONSTITUTED SOLUTION

## 2022-09-22 RX ORDER — ERYTHROMYCIN 5 MG/G
1 OINTMENT OPHTHALMIC ONCE
Status: COMPLETED | OUTPATIENT
Start: 2022-09-22 | End: 2022-09-23

## 2022-09-22 RX ORDER — LORAZEPAM 2 MG/ML
1 INJECTION INTRAMUSCULAR ONCE
Status: COMPLETED | OUTPATIENT
Start: 2022-09-22 | End: 2022-09-22

## 2022-09-22 RX ORDER — SODIUM CHLORIDE 0.9 % (FLUSH) 0.9 %
10 SYRINGE (ML) INJECTION AS NEEDED
Status: DISCONTINUED | OUTPATIENT
Start: 2022-09-22 | End: 2022-09-25 | Stop reason: HOSPADM

## 2022-09-22 RX ORDER — HALOPERIDOL 5 MG/ML
10 INJECTION INTRAMUSCULAR ONCE
Status: COMPLETED | OUTPATIENT
Start: 2022-09-22 | End: 2022-09-22

## 2022-09-22 RX ADMIN — CEFTRIAXONE 2 G: 2 INJECTION, POWDER, FOR SOLUTION INTRAMUSCULAR; INTRAVENOUS at 22:28

## 2022-09-22 RX ADMIN — VANCOMYCIN HYDROCHLORIDE 2000 MG: 10 INJECTION, POWDER, LYOPHILIZED, FOR SOLUTION INTRAVENOUS at 23:06

## 2022-09-22 RX ADMIN — HALOPERIDOL LACTATE 10 MG: 5 INJECTION, SOLUTION INTRAMUSCULAR at 22:40

## 2022-09-22 RX ADMIN — LORAZEPAM 1 MG: 2 INJECTION INTRAMUSCULAR; INTRAVENOUS at 20:04

## 2022-09-22 NOTE — ED PROVIDER NOTES
Subjective   History of Present Illness  Chief Complaint: Altered mental status, increasing weakness      HPI: Patient is a 47-year-old male presents to the ER today by private vehicle with family at bedside.  He has a known history of MS with chronic left-sided numbness and weakness.  Family states that over the last 2 weeks he has had progressive worsening weakness as well as auditory and visual hallucinations with intermittent confusion.  He was seen by his MS physician and labs were obtained, they were told that he has leukocytosis and advised to follow-up with primary care.  Reportedly primary care said that the white blood cell count was not elevated enough for intervention.  Family reports he has a history of encephalitis approximately 1 year ago when he had similar symptoms.  Patient's family also states that he has had bilateral eye crusting and itching with drainage.  They have not attempted treatment prior to arrival for this.    PCP: Mireille        Review of Systems   Unable to perform ROS: Mental status change       Past Medical History:   Diagnosis Date   • Foot pain    • Hypertension    • Migraine    • MS (multiple sclerosis) ([facility]) 2015       No Known Allergies    Past Surgical History:   Procedure Laterality Date   • FOOT FRACTURE SURGERY Left 2002   • FOOT FRACTURE SURGERY Left    • LEG SURGERY Right     Fibula plates and screws       No family history on file.    Social History     Socioeconomic History   • Marital status:    Tobacco Use   • Smoking status: Never Smoker   • Smokeless tobacco: Never Used   Vaping Use   • Vaping Use: Never used   Substance and Sexual Activity   • Alcohol use: Never   • Drug use: Never   • Sexual activity: Defer           Objective   Physical Exam  Vitals reviewed.   Constitutional:       Appearance: He is not ill-appearing or toxic-appearing.   HENT:      Head: Normocephalic.      Nose: Nose normal.      Mouth/Throat:      Mouth: Mucous membranes are dry.       "Pharynx: Oropharynx is clear.   Eyes:      General:         Right eye: Discharge present.      Extraocular Movements: Extraocular movements intact.      Conjunctiva/sclera:      Right eye: Right conjunctiva is injected.      Left eye: Left conjunctiva is injected.      Pupils: Pupils are equal, round, and reactive to light.      Comments: Bilateral injected sclera, with crusting on bilateral lids.   Cardiovascular:      Rate and Rhythm: Normal rate and regular rhythm.   Pulmonary:      Effort: Pulmonary effort is normal.      Breath sounds: Normal breath sounds. No wheezing.   Abdominal:      General: Bowel sounds are normal.      Palpations: Abdomen is soft.      Tenderness: There is no abdominal tenderness.   Musculoskeletal:      Cervical back: Neck supple. No rigidity.   Skin:     General: Skin is warm and dry.   Neurological:      General: No focal deficit present.      Mental Status: He is alert. He is disoriented.      Motor: No weakness.         Procedures           ED Course  ED Course as of 09/22/22 2237   u Sep 22, 2022   1831 Patient evaluated after being placed in a room from triage. []   2044 Pending CT results []      ED Course User Index  [] Lorena Barrera, APRN           BP 96/61   Pulse 97   Temp 98.4 °F (36.9 °C)   Resp 18   Ht 175.3 cm (69\")   Wt 111 kg (245 lb)   SpO2 97%   BMI 36.18 kg/m²   Labs Reviewed   COMPREHENSIVE METABOLIC PANEL - Abnormal; Notable for the following components:       Result Value    Glucose 101 (*)     Creatinine 1.37 (*)     All other components within normal limits    Narrative:     GFR Normal >60  Chronic Kidney Disease <60  Kidney Failure <15     CBC WITH AUTO DIFFERENTIAL - Abnormal; Notable for the following components:    Lymphocyte % 14.5 (*)     Neutrophils, Absolute 7.40 (*)     All other components within normal limits   BLOOD GAS, ARTERIAL - Abnormal; Notable for the following components:    CO2 Content 29.3 (*)     All other components " within normal limits   POCT GLUCOSE FINGERSTICK - Abnormal; Notable for the following components:    Glucose 112 (*)     All other components within normal limits   URINALYSIS W/ MICROSCOPIC IF INDICATED (NO CULTURE) - Normal    Narrative:     Urine microscopic not indicated.   POC LACTATE - Normal   BLOOD CULTURE   BLOOD CULTURE   MRSA SCREEN, PCR   BLOOD GAS, ARTERIAL   POC LACTATE   CBC AND DIFFERENTIAL    Narrative:     The following orders were created for panel order CBC & Differential.  Procedure                               Abnormality         Status                     ---------                               -----------         ------                     CBC Auto Differential[[id number]]        Abnormal            Final result                 Please view results for these tests on the individual orders.     Medications   sodium chloride 0.9 % flush 10 mL (has no administration in time range)   cefTRIAXone (ROCEPHIN) 2 g in sodium chloride 0.9 % 100 mL IVPB (2 g Intravenous New Bag 9/22/22 2228)   acyclovir (ZOVIRAX) 870 mg in sodium chloride 0.9 % 250 mL IVPB (has no administration in time range)   Pharmacy to dose vancomycin (has no administration in time range)   haloperidol lactate (HALDOL) injection 10 mg (has no administration in time range)   vancomycin 2000 mg/500 mL 0.9% NS IVPB ([facility]) (has no administration in time range)   erythromycin (ROMYCIN) ophthalmic ointment 1 application (has no administration in time range)   LORazepam (ATIVAN) injection 1 mg (1 mg Intravenous Given 9/22/22 2004)     CT Head Without Contrast    Result Date: 9/22/2022  No acute intracranial abnormality.  Electronically Signed By-Prosper Carrillo On:9/22/2022 8:58 PM This report was finalized on [id number] by  Prosper Carrillo, .                                    MDM  Number of Diagnoses or Management Options  Altered mental status, unspecified altered mental status type  Blepharoconjunctivitis of both eyes, unspecified  blepharoconjunctivitis type  Hallucinations  MS (multiple sclerosis) (HCC)  Diagnosis management comments: While in the emergency room patient was placed on appropriate monitoring and an IV was established.  During initial evaluation patient had some hallucinations with confusion, following Ativan administration for CT patient is now completely confused and is attempting to get out of bed on multiple occasions.  At my reevaluation there is no family at bedside and patient is talking inappropriately.  Dr. Barlow brought to bedside additional orders were placed.  CBC and CMP were essentially normal, urinalysis negative for urinary tract infection CT of the head was negative for acute findings.    Pending results of chest x-ray time of admission.    Dr. Barlow spoke with Kristy nurse practitioner with the hospitalist group for admission.    Patient's known history of MS and acute altered mental status as well as history of encephalitis he will be treated for CNS infection with acyclovir, vancomycin and Rocephin.  Call was placed to neurology, spoke with Dr. Buenrostro who advised administration okay for Haldol do not give any more Ativan.  Continue treatment for possible CNS infection.  Patient may require MRI or a lumbar puncture in the near future but does not require it during his ER stay.    Chart review:  4/30/2022 Dizziness, orthostasis  2/19/2022 MRI brain  T2/FLAIR hyperintense signal foci within the supratentorial white matter and within the left erick, likely representing changes related to clinical diagnosis of multiple sclerosis. No findings to suggest acute demyelination.     2/18/2022 MS exacerbation    6/25/2021 Altered mental status, SULTANA, Acute toxic metabolic encephalopathy with psychosis      Comorbidities: see above, reviewed     Differentials: MS exacerbation, electrolyte imbalance, dehydration, infection, metabolic encephalopathy not all inclusive of differentials considered    Appropriate PPE worn  throughout the care of this patient.    Risk of Complications, Morbidity, and/or Mortality  Presenting problems: high  Diagnostic procedures: high  Management options: high        Final diagnoses:   Altered mental status, unspecified altered mental status type   MS (multiple sclerosis) (HCC)   Hallucinations   Blepharoconjunctivitis of both eyes, unspecified blepharoconjunctivitis type       ED Disposition  ED Disposition     ED Disposition   Decision to Admit    Condition   --    Comment   Level of Care: Critical Care [6]   Admitting Physician: RACHEL CAGE [[id number]]   Attending Physician: RACHEL CAGE [[id number]]   Bed Request Comments: FLAKO               No follow-up provider specified.       Medication List      No changes were made to your prescriptions during this visit.          Lorena Barrera, APRN  09/22/22 4779

## 2022-09-23 PROBLEM — R44.3 HALLUCINATION: Status: ACTIVE | Noted: 2022-09-23

## 2022-09-23 PROBLEM — R53.1 GENERAL WEAKNESS: Status: ACTIVE | Noted: 2022-09-23

## 2022-09-23 PROBLEM — N28.9 RENAL INSUFFICIENCY: Status: ACTIVE | Noted: 2022-09-23

## 2022-09-23 PROBLEM — H10.33 ACUTE CONJUNCTIVITIS OF BOTH EYES: Status: ACTIVE | Noted: 2022-09-23

## 2022-09-23 PROBLEM — R41.82 ALTERED MENTAL STATUS, UNSPECIFIED ALTERED MENTAL STATUS TYPE: Status: ACTIVE | Noted: 2022-09-23

## 2022-09-23 PROBLEM — G35 MULTIPLE SCLEROSIS (HCC): Status: ACTIVE | Noted: 2022-09-23

## 2022-09-23 LAB
AMPHET+METHAMPHET UR QL: NEGATIVE
ANION GAP SERPL CALCULATED.3IONS-SCNC: 9 MMOL/L (ref 5–15)
BARBITURATES UR QL SCN: NEGATIVE
BASOPHILS # BLD AUTO: 0 10*3/MM3 (ref 0–0.2)
BASOPHILS NFR BLD AUTO: 0.6 % (ref 0–1.5)
BENZODIAZ UR QL SCN: NEGATIVE
BUN SERPL-MCNC: 14 MG/DL (ref 6–20)
BUN/CREAT SERPL: 11.3 (ref 7–25)
CALCIUM SPEC-SCNC: 8 MG/DL (ref 8.6–10.5)
CANNABINOIDS SERPL QL: NEGATIVE
CHLORIDE SERPL-SCNC: 101 MMOL/L (ref 98–107)
CO2 SERPL-SCNC: 26 MMOL/L (ref 22–29)
COCAINE UR QL: NEGATIVE
CREAT SERPL-MCNC: 1.24 MG/DL (ref 0.76–1.27)
DEPRECATED RDW RBC AUTO: 45.1 FL (ref 37–54)
EGFRCR SERPLBLD CKD-EPI 2021: 73.1 ML/MIN/1.73
EOSINOPHIL # BLD AUTO: 0.3 10*3/MM3 (ref 0–0.4)
EOSINOPHIL NFR BLD AUTO: 3.8 % (ref 0.3–6.2)
ERYTHROCYTE [DISTWIDTH] IN BLOOD BY AUTOMATED COUNT: 14.7 % (ref 12.3–15.4)
GLUCOSE SERPL-MCNC: 103 MG/DL (ref 65–99)
HCT VFR BLD AUTO: 38.4 % (ref 37.5–51)
HGB BLD-MCNC: 12.5 G/DL (ref 13–17.7)
LYMPHOCYTES # BLD AUTO: 1.8 10*3/MM3 (ref 0.7–3.1)
LYMPHOCYTES NFR BLD AUTO: 22 % (ref 19.6–45.3)
MCH RBC QN AUTO: 27.9 PG (ref 26.6–33)
MCHC RBC AUTO-ENTMCNC: 32.5 G/DL (ref 31.5–35.7)
MCV RBC AUTO: 86 FL (ref 79–97)
METHADONE UR QL SCN: NEGATIVE
MONOCYTES # BLD AUTO: 0.8 10*3/MM3 (ref 0.1–0.9)
MONOCYTES NFR BLD AUTO: 10.2 % (ref 5–12)
MRSA DNA SPEC QL NAA+PROBE: NORMAL
NEUTROPHILS NFR BLD AUTO: 5.2 10*3/MM3 (ref 1.7–7)
NEUTROPHILS NFR BLD AUTO: 63.4 % (ref 42.7–76)
NRBC BLD AUTO-RTO: 0 /100 WBC (ref 0–0.2)
OPIATES UR QL: POSITIVE
OXYCODONE UR QL SCN: NEGATIVE
PLATELET # BLD AUTO: 206 10*3/MM3 (ref 140–450)
PMV BLD AUTO: 8.2 FL (ref 6–12)
POTASSIUM SERPL-SCNC: 3.6 MMOL/L (ref 3.5–5.2)
RBC # BLD AUTO: 4.46 10*6/MM3 (ref 4.14–5.8)
SODIUM SERPL-SCNC: 136 MMOL/L (ref 136–145)
WBC NRBC COR # BLD: 8.2 10*3/MM3 (ref 3.4–10.8)

## 2022-09-23 PROCEDURE — 25010000002 VANCOMYCIN HCL 1.25 G RECONSTITUTED SOLUTION 1 EACH VIAL

## 2022-09-23 PROCEDURE — 96366 THER/PROPH/DIAG IV INF ADDON: CPT

## 2022-09-23 PROCEDURE — 87641 MR-STAPH DNA AMP PROBE: CPT

## 2022-09-23 PROCEDURE — 25010000002 ACYCLOVIR PER 5 MG

## 2022-09-23 PROCEDURE — 25010000002 ONDANSETRON PER 1 MG: Performed by: INTERNAL MEDICINE

## 2022-09-23 PROCEDURE — 25010000002 ACYCLOVIR PER 5 MG: Performed by: NURSE PRACTITIONER

## 2022-09-23 PROCEDURE — 36415 COLL VENOUS BLD VENIPUNCTURE: CPT | Performed by: NURSE PRACTITIONER

## 2022-09-23 PROCEDURE — 84100 ASSAY OF PHOSPHORUS: CPT | Performed by: INTERNAL MEDICINE

## 2022-09-23 PROCEDURE — G0378 HOSPITAL OBSERVATION PER HR: HCPCS

## 2022-09-23 PROCEDURE — 83735 ASSAY OF MAGNESIUM: CPT | Performed by: INTERNAL MEDICINE

## 2022-09-23 PROCEDURE — 85025 COMPLETE CBC W/AUTO DIFF WBC: CPT | Performed by: NURSE PRACTITIONER

## 2022-09-23 PROCEDURE — 97161 PT EVAL LOW COMPLEX 20 MIN: CPT

## 2022-09-23 PROCEDURE — 96376 TX/PRO/DX INJ SAME DRUG ADON: CPT

## 2022-09-23 PROCEDURE — 25010000002 CEFTRIAXONE PER 250 MG: Performed by: NURSE PRACTITIONER

## 2022-09-23 PROCEDURE — 80053 COMPREHEN METABOLIC PANEL: CPT | Performed by: NURSE PRACTITIONER

## 2022-09-23 PROCEDURE — 25010000002 KETOROLAC TROMETHAMINE PER 15 MG: Performed by: INTERNAL MEDICINE

## 2022-09-23 PROCEDURE — 99214 OFFICE O/P EST MOD 30 MIN: CPT | Performed by: NURSE PRACTITIONER

## 2022-09-23 PROCEDURE — 85025 COMPLETE CBC W/AUTO DIFF WBC: CPT | Performed by: INTERNAL MEDICINE

## 2022-09-23 PROCEDURE — 96367 TX/PROPH/DG ADDL SEQ IV INF: CPT

## 2022-09-23 PROCEDURE — 92610 EVALUATE SWALLOWING FUNCTION: CPT

## 2022-09-23 PROCEDURE — 87040 BLOOD CULTURE FOR BACTERIA: CPT | Performed by: INTERNAL MEDICINE

## 2022-09-23 RX ORDER — ONDANSETRON 2 MG/ML
4 INJECTION INTRAMUSCULAR; INTRAVENOUS EVERY 6 HOURS PRN
Status: DISCONTINUED | OUTPATIENT
Start: 2022-09-23 | End: 2022-09-25 | Stop reason: HOSPADM

## 2022-09-23 RX ORDER — DOPAMINE HYDROCHLORIDE 160 MG/100ML
2-20 INJECTION, SOLUTION INTRAVENOUS
Status: DISCONTINUED | OUTPATIENT
Start: 2022-09-23 | End: 2022-09-25 | Stop reason: HOSPADM

## 2022-09-23 RX ORDER — SODIUM CHLORIDE 9 MG/ML
100 INJECTION, SOLUTION INTRAVENOUS CONTINUOUS
Status: DISCONTINUED | OUTPATIENT
Start: 2022-09-23 | End: 2022-09-25 | Stop reason: HOSPADM

## 2022-09-23 RX ORDER — ERYTHROMYCIN 5 MG/G
OINTMENT OPHTHALMIC EVERY 12 HOURS
Status: DISCONTINUED | OUTPATIENT
Start: 2022-09-23 | End: 2022-09-25 | Stop reason: HOSPADM

## 2022-09-23 RX ORDER — KETOROLAC TROMETHAMINE 15 MG/ML
15 INJECTION, SOLUTION INTRAMUSCULAR; INTRAVENOUS EVERY 6 HOURS PRN
Status: DISCONTINUED | OUTPATIENT
Start: 2022-09-23 | End: 2022-09-25 | Stop reason: HOSPADM

## 2022-09-23 RX ADMIN — ACYCLOVIR SODIUM 870 MG: 1000 INJECTION, SOLUTION INTRAVENOUS at 01:37

## 2022-09-23 RX ADMIN — KETOROLAC TROMETHAMINE 15 MG: 15 INJECTION, SOLUTION INTRAMUSCULAR; INTRAVENOUS at 15:30

## 2022-09-23 RX ADMIN — SODIUM CHLORIDE 100 ML/HR: 9 INJECTION, SOLUTION INTRAVENOUS at 01:28

## 2022-09-23 RX ADMIN — ONDANSETRON 4 MG: 2 INJECTION INTRAMUSCULAR; INTRAVENOUS at 20:35

## 2022-09-23 RX ADMIN — ERYTHROMYCIN 1 APPLICATION: 5 OINTMENT OPHTHALMIC at 02:00

## 2022-09-23 RX ADMIN — VANCOMYCIN HYDROCHLORIDE 1250 MG: 1.25 INJECTION, POWDER, LYOPHILIZED, FOR SOLUTION INTRAVENOUS at 11:22

## 2022-09-23 RX ADMIN — ACYCLOVIR SODIUM 870 MG: 1000 INJECTION, SOLUTION INTRAVENOUS at 10:30

## 2022-09-23 RX ADMIN — CEFTRIAXONE 2 G: 2 INJECTION, POWDER, FOR SOLUTION INTRAMUSCULAR; INTRAVENOUS at 09:47

## 2022-09-23 RX ADMIN — ERYTHROMYCIN 1 APPLICATION: 5 OINTMENT OPHTHALMIC at 20:22

## 2022-09-23 RX ADMIN — Medication 10 ML: at 20:22

## 2022-09-23 RX ADMIN — SODIUM CHLORIDE 1000 ML: 9 INJECTION, SOLUTION INTRAVENOUS at 03:00

## 2022-09-23 RX ADMIN — KETOROLAC TROMETHAMINE 15 MG: 15 INJECTION, SOLUTION INTRAMUSCULAR; INTRAVENOUS at 23:06

## 2022-09-23 NOTE — CONSULTS
Infectious Diseases Consult Note    Referring Provider: Manan Hernandez MD    Reason for Consultation: Positive blood culture, possible meningitis/encephalitis    Patient Care Team:  Tyrone Kendrick MD as PCP - General (Hospitalist)    Chief complaint acute mental status change, hallucinations, weakness at admission    Subjective     The patient has been afebrile for the last 24 hours.  The patient is on 2 L of oxygen by nasal cannula, hemodynamically stable, and is tolerating antimicrobial therapy.    History of present illness:     Is a 47-year-old male who presents to the hospital on 9/22/2022 with acute mental status changes along with visual and audio hallucinations and generalized weakness.  Patient is a relatively poor historian but his mother is at bedside to help with history.  Apparently he started to appear somewhat confused on Tuesday with symptoms worsening.  She feels that his mental status is better today although he is not completely back to his baseline.  He denies fever, chills, diaphoresis, shortness of breath, cough, GI symptoms, any urinary symptoms.  Patient denies stiff neck, photophobia or headache    Patient is currently on 2 L of oxygen by nasal cannula but does not appear to be in acute distress.  Patient's been afebrile since admission.  Patient is creatinine 1.24, white blood cell count 8.2, he was 12.5 up to [place] to 60.  1 out of 2 blood cultures from admission is growing gram-positive bacilli and MRSA the nares screen is negative.  Chest x-ray did not show any acute findings and CT of the head was negative for any acute findings.  Patient is currently on IV acyclovir, IV Rocephin and IV vancomycin has no known allergy    Review of Systems   Review of Systems   HENT: Negative.    Eyes: Negative.    Respiratory: Negative.    Cardiovascular: Negative.    Gastrointestinal: Negative.    Endocrine: Negative.    Genitourinary: Negative.    Musculoskeletal: Negative.    Skin: Negative.     Neurological: Positive for weakness.   Psychiatric/Behavioral: Positive for confusion and hallucinations.   All other systems reviewed and are negative.      Medications  Medications Prior to Admission   Medication Sig Dispense Refill Last Dose   • amLODIPine (NORVASC) 10 MG tablet Take 10 mg by mouth Daily.   Past Week at Unknown time   • baclofen (LIORESAL) 20 MG tablet Take 1 tablet by mouth Every 6 (Six) Hours As Needed for Muscle Spasms. 120 tablet 1 Past Week at Unknown time   • cyclobenzaprine (FLEXERIL) 10 MG tablet Take 1 tablet by mouth 3 (Three) Times a Day. 90 tablet 1 Past Week at Unknown time   • lisinopril (PRINIVIL,ZESTRIL) 10 MG tablet Take 10 mg by mouth Daily.   Past Week at Unknown time   • meclizine (ANTIVERT) 12.5 MG tablet Take 12.5 mg by mouth 3 (Three) Times a Day As Needed for Dizziness.   Past Week at Unknown time   • meloxicam (MOBIC) 15 MG tablet Take 1 tablet by mouth Daily. 30 tablet 1 Past Week at Unknown time   • Methylnaltrexone Bromide (Relistor) 150 MG tablet Take 3 tablets by mouth Daily. 90 tablet 1 Past Week at Unknown time   • Morphine (MS CONTIN) 15 MG 12 hr tablet Take 1 tablet by mouth 3 (Three) Times a Day. 90 tablet 0 Past Week at Unknown time   • Morphine (MS CONTIN) 15 MG 12 hr tablet Take 1 tablet by mouth 3 (Three) Times a Day As Needed for Severe Pain . 90 tablet 0 Past Week at Unknown time   • Morphine (MSIR) 15 MG tablet Take 1 tablet by mouth 3 (Three) Times a Day As Needed for Severe Pain . 90 tablet 0 Past Week at Unknown time   • Morphine (MSIR) 15 MG tablet Take 1 tablet by mouth 3 (Three) Times a Day As Needed for Severe Pain . 90 tablet 0 Past Week at Unknown time   • pregabalin (LYRICA) 300 MG capsule Take 1 capsule by mouth 2 (Two) Times a Day. 60 capsule 1 Past Week at Unknown time   • rizatriptan (MAXALT) 10 MG tablet Take 10 mg by mouth 1 (One) Time As Needed for Migraine. May repeat in 2 hours if needed   Past Week at Unknown time   • bethanechol  (URECHOLINE) 25 MG tablet Take 25 mg by mouth 3 (Three) Times a Day.   Unknown at Unknown time       History  Past Medical History:   Diagnosis Date   • Foot pain    • Hypertension    • Migraine    • MS (multiple sclerosis) ([facility]) 2015     Past Surgical History:   Procedure Laterality Date   • FOOT FRACTURE SURGERY Left 2002   • FOOT FRACTURE SURGERY Left    • LEG SURGERY Right     Fibula plates and screws       Family History  History reviewed. No pertinent family history.    Social History   reports that he has never smoked. He has never used smokeless tobacco. He reports that he does not drink alcohol and does not use drugs.    Allergies  Patient has no known allergies.    Objective     Vital Signs   Vital Signs (last 24 hours)       09/22 0700  09/23 0659 09/23 0700  09/23 1459   Most Recent      Temp (°F) 98.2 -  98.4    98.1 -  98.2     98.1 (36.7) 09/23 1414    Heart Rate 69 -  118    76 -  99     99 09/23 1414    Resp 10 -  18    11 -  13     13 09/23 1414    BP 77/42 -  152/125    101/68 -  102/55     101/68 09/23 1414    SpO2 (%) 89 -  100    97 -  100     97 09/23 1414          Physical Exam:  Physical Exam  Vitals and nursing note reviewed.   Constitutional:       General: He is not in acute distress.     Appearance: Normal appearance. He is well-developed and normal weight. He is not diaphoretic.   HENT:      Head: Normocephalic and atraumatic.   Eyes:      Conjunctiva/sclera: Conjunctivae normal.      Pupils: Pupils are equal, round, and reactive to light.   Cardiovascular:      Rate and Rhythm: Normal rate and regular rhythm.      Heart sounds: Normal heart sounds, S1 normal and S2 normal.   Pulmonary:      Effort: Pulmonary effort is normal. No respiratory distress.      Breath sounds: Normal breath sounds. No stridor. No wheezing or rales.   Abdominal:      General: Bowel sounds are normal. There is no distension.      Palpations: Abdomen is soft. There is no mass.      Tenderness: There is no  abdominal tenderness. There is no guarding.   Musculoskeletal:         General: No deformity. Normal range of motion.      Cervical back: Normal range of motion and neck supple.   Skin:     General: Skin is warm and dry.      Coloration: Skin is not pale.      Findings: No erythema or rash.   Neurological:      Mental Status: He is alert.      Cranial Nerves: No cranial nerve deficit.      Comments: Alert and oriented x2-seems slightly confused and is a poor historian    Slight left lower leg weakness-family states this is chronic   Psychiatric:         Mood and Affect: Mood normal.         Microbiology  Microbiology Results (last 10 days)     Procedure Component Value - Date/Time    MRSA Screen, PCR (Inpatient) - Swab, Nares [[id number]]  (Normal) Collected: 09/23/22 0259    Lab Status: Final result Specimen: Swab from Nares Updated: 09/23/22 0438     MRSA PCR No MRSA Detected    Narrative:      The negative predictive value of this diagnostic test is high and should only be used to consider de-escalating anti-MRSA therapy. A positive result may indicate colonization with MRSA and must be correlated clinically.    Blood Culture - Blood, Arm, Right [[id number]]  (Abnormal) Collected: 09/22/22 1855    Lab Status: Preliminary result Specimen: Blood from Arm, Right Updated: 09/23/22 0912     Blood Culture Abnormal Stain     Gram Stain Anaerobic Bottle Gram positive bacilli, large          Laboratory  Results from last 7 days   Lab Units 09/23/22  0131   WBC 10*3/mm3 8.20   HEMOGLOBIN g/dL 12.5*   HEMATOCRIT % 38.4   PLATELETS 10*3/mm3 206     Results from last 7 days   Lab Units 09/23/22  0131   SODIUM mmol/L 136   POTASSIUM mmol/L 3.6   CHLORIDE mmol/L 101   CO2 mmol/L 26.0   BUN mg/dL 14   CREATININE mg/dL 1.24   GLUCOSE mg/dL 103*   CALCIUM mg/dL 8.0*     Results from last 7 days   Lab Units 09/23/22  0131   SODIUM mmol/L 136   POTASSIUM mmol/L 3.6   CHLORIDE mmol/L 101   CO2 mmol/L 26.0   BUN mg/dL 14   CREATININE  mg/dL 1.24   GLUCOSE mg/dL 103*   CALCIUM mg/dL 8.0*                   Radiology  Imaging Results (Last 72 Hours)     Procedure Component Value Units Date/Time    XR Chest 1 View [[id number]] Collected: 09/22/22 2321     Updated: 09/22/22 2324    Narrative:         DATE OF EXAM:   9/22/2022 10:14 PM     PROCEDURE:   XR CHEST 1 VW-     INDICATIONS:   ams; R41.82-Altered mental status, unspecified; G35-Multiple sclerosis;  R44.3-Hallucinations, unspecified; H10.503-Unspecified  blepharoconjunctivitis, bilateral     COMPARISON:  4/30/2022 and prior     TECHNIQUE:   Portable Chest     FINDINGS:      Lung volumes are low. Study is limited by overlying support and  monitoring apparatus. Heart size is stable. Pulmonary vascularity is  mildly congested with vascular crowding and dependent opacities at the  lung bases likely representing atelectasis. Osseous structures are  unremarkable        Impression:      IMPRESSION :   Low lung volume exam with mild pulmonary vascular congestion and  probable dependent atelectasis[     Electronically Signed By-Prosper Carrillo On:9/22/2022 11:22 PM  This report was finalized on [id number] by  Prosper Carrillo, .    CT Head Without Contrast [[id number]] Collected: 09/22/22 2055     Updated: 09/22/22 2100    Narrative:      CT HEAD WO CONTRAST-     Date of Exam: 9/22/2022 8:20 PM     Indication: altered, hx MS.     Comparison: 4/30/2022 and prior     Technique:  Contiguous axial CT images of the head were obtained without  contrast from skull base to vertex.  Coronal and sagittal  reconstructions were performed.  Automated exposure control and  iterative reconstruction methods were used.       FINDINGS:  Brain/Ventricles: There is no acute intracranial hemorrhage, mass effect  or midline shift. No abnormal extra-axial fluid collection.  The  gray-white differentiation is maintained without evidence of an acute  infarct.  There is no evidence of hydrocephalus     Orbits: The visualized  portion of the orbits demonstrate no acute  abnormality.     Sinuses:The visualized paranasal sinuses and mastoid air cells  demonstrate no acute abnormality.     Soft Tissues/Skull: No acute abnormality of the visualized skull or soft  tissues.       Impression:      No acute intracranial abnormality.     Electronically Signed By-Prosper Carrillo On:9/22/2022 8:58 PM  This report was finalized on [id number] by  Prosper Carrillo, .          Cardiology      Results Review:  I have reviewed all clinical data, test, lab, and imaging results.       Schedule Meds  erythromycin, , Both Eyes, Q12H        Infusion Meds  DOPamine, 2-20 mcg/kg/min, Last Rate: Stopped (09/23/22 0606)  sodium chloride, 100 mL/hr, Last Rate: 100 mL/hr (09/23/22 0606)        PRN Meds  [COMPLETED] Insert peripheral IV **AND** sodium chloride      Assessment & Plan       Assessment    1 out of 2 blood cultures from admission positive for gram positive bacilli which is contamination    Patient came in with complaints of acute mental status change, hallucinations and weakness.  Patient does not complain of fever, photophobia, neck stiffness or headache.  Overall findings/assessment are not concerning for meningitis  -CT of the head did not show any acute findings  -Patient does take a large amount of narcotics and muscle relaxants for his multiple sclerosis.   Mental status change issues likely related to sedating medications.  Apparently one  family member was concerned that patient was taking more pain medicine than was prescribed    Multiple sclerosis.  Family states patient is relatively immobile and stays in his bedroom most of the time.  Does have chronic left lower leg weakness  -Receives infusions of Ocrevus every 6 months-last infusion on 7/12/2022        Plan    Discontinue IV acyclovir, Rocephin and vancomycin  Monitor off antimicrobial therapy  Continue supportive care  Case discussed with patient, mother and RN at bedside  Case  discussed with pharmacy  Not much more to add from infectious disease standpoint-we will sign off at this time-please call with any questions.    Ashley Evans, APRN  09/23/22  14:59 EDT    Note is dictated utilizing voice recognition software/Dragon

## 2022-09-23 NOTE — CASE MANAGEMENT/SOCIAL WORK
Discharge Planning Assessment  [facility]     Patient Name: Jorge Luis Nuno  MRN: [id number]  Today's Date: 9/23/2022    Admit Date: 9/22/2022    Plan: DC Plan: Retrun home w/mother who will provide transportation. PT referral.   Discharge Needs Assessment     Row Name 09/23/22 1541       Living Environment    People in Home parent(s)    Name(s) of People in Home Mother Jacque    Unique Family Situation Confirms mother is his POA.    Current Living Arrangements home    Primary Care Provided by self;parent(s)    Provides Primary Care For no one    Family Caregiver if Needed parent(s)    Quality of Family Relationships unable to assess    Able to Return to Prior Arrangements yes       Resource/Environmental Concerns    Resource/Environmental Concerns none       Transition Planning    Patient/Family Anticipates Transition to home with family    Patient/Family Anticipated Services at Transition     Transportation Anticipated family or friend will provide       Discharge Needs Assessment    Readmission Within the Last 30 Days no previous admission in last 30 days    Concerns to be Addressed denies needs/concerns at this time    Concerns Comments PT/OT pending.    Anticipated Changes Related to Illness none    Discharge Coordination/Progress DC Plan: Return home with mother, who will provide transportation.               Discharge Plan     Row Name 09/23/22 1548       Plan    Plan DC Plan: Retrun home w/mother who will provide transportation. PT referral.    Row Name 09/23/22 1542       Plan    Plan DC Plan: Return home w/mother who will provide transportation. PT/OT referrals.    Plan Comments Discussed DC plan with pt. ID has discontinued antibiotics. PT walked pt in [place], pending notes. Pt confirmed insurnace and PCP. Pharmacy is [facility] outside of [place]. Declines Meds to Beds.              Continued Care and Services - Admitted Since 9/22/2022    Coordination has not been started for this encounter.           Demographic Summary     Row Name 09/23/22 1537       General Information    Admission Type observation    Arrived From emergency department    Required Notices Provided Observation Status Notice    Referral Source admission list    Reason for Consult discharge planning    Preferred Language English    General Information Comments Spoke with pt in room.               Functional Status     Row Name 09/23/22 1539       Functional Status    Usual Activity Tolerance moderate    Current Activity Tolerance fair       Functional Status, IADL    Medications independent    Meal Preparation independent    Housekeeping assistive person    Laundry assistive person    Shopping assistive person    IADL Comments Pt has rolling walker and cane at home but states he does not use them.       Mental Status    General Appearance WDL appearance    General Appearance unkempt       Mental Status Summary    Recent Changes in Mental Status/Cognitive Functioning no changes              Met with patient in room wearing PPE: mask.      Maintained distance greater than six feet and spent less than 15 minutes in the room.               Eliezer Reese RN

## 2022-09-23 NOTE — PLAN OF CARE
Goal Outcome Evaluation:      Patient was seen for dysphagia evaluation per failed swallow screen. According to nursing, patient lives at home with his mother. Patient had acute mental status change. Most recent chest x-ray revealed low lung volume exam with mild pulmonary vascular congestion and probable dependent atelectasis. Most recent CT revealed no acute intracranial abnormality.  Patient has his own natural teeth. Oral mechanism examination revealed adequate ROM and mobility of lingual protrusion, retraction, lateralization and A-P movement, however, demonstrated reduced labial protrusion/retraction. Patient was sitting upright in bed prior to trials. Patient was observed taking successive swallows of thins via straw without s/s of aspiration or complications. Swallow was timely. Laryngeal elevation was adequate. No wet vocal quality was detected. Patient refused mixed consistency and soft solid trials as patient did not like choices provided. Provided trials of regular textured item. Patient demonstrated adequate rotary chewing. Patient exhibited appropriate oral control, manipulation and cohesion of bolus. Noted lingual sweep to adequately clear lateral sulcus. No s/s of aspiration were observed. Patient cleared oral cavity completely between bites. It is recommended that patient receive regular diet with thin liquids at this time. ST will follow to assure safety and tolerance with recommended diet.

## 2022-09-23 NOTE — CASE MANAGEMENT/SOCIAL WORK
Social Work Assessment  [facility]     Patient Name: Jorge Luis Nuno  MRN: [id number]  Today's Date: 9/23/2022    Admit Date: 9/22/2022     Discharge Needs Assessment     Row Name 09/23/22 1659       Discharge Needs Assessment    Concerns to be Addressed decision-making    Concerns Comments SW attempted to meet w/ patient at bedside in room 263. Patient was lying in bed w/ his mother present. Jacque patient’s mother stated that she is patients' health POA. SW introduced self and reason for consult: Advanced Care Planning. Jacque stated that she is aware of the living will and health care representative and accepted the advanced care planning packet. Jacque stated that patient wants to be made a DNR. SW informed her that she would have patients nurse come and speak to her because a DNR is a doctor’s order. She thanked SW and stated that she did not have any other needs at this time. SW met w/ patients nurse told her that patients mother needed to speak to her about patient wanting to be a DNR.                 Met with patient in room wearing PPE: mask, face shield/goggles, gloves, gown.      Maintained distance greater than six feet and spent less than 15 minutes in the room.      Mirella Watkins,  [facility]        [phone] office  [phone] fax    [email or web address]

## 2022-09-23 NOTE — CONSULTS
"Primary Care Provider: Tyrone Kendrick MD     Consult requested by: Kristy Navarro NP     Reason for Consultation: Neurological evaluation, Hx of MS/ Hallucinations     History taken from: patient chart family RN    Chief complaint: Confusion, hallucinations       SUBJECTIVE:    History of present illness: Background per H&P: Jorge Luis Nuno is a 47 y.o. male who presented to [facility] on 9/22/2022 complaining of progressive generalized weakness as well as auditory and visual hallucinations with intermittent confusion.  He is currently awake and alert and answers appropriately.  Mother at bedside assisting with history.  Mother reports he had an episode of encephalitis a year ago with similar symptoms.  No hallucinations were noted on exam.  He has a past medical history of multiple sclerosis and chronic left-sided weakness.  Review of records shows he was admitted here with an MS flare in February.  He is on very high-dose MS Contin per inspect 30 mg 3 times a day..  He sees neurology at [facility].  CT head per radiology today showed no acute abnormality, chest x-ray per radiology showed \"low lung volume exam with mild pulmonary vascular congestion and probable dependent atelectasis\".  Urinalysis was negative for infection, creatinine 1.37 glucose 101 WBC 10.2.  In discussion with the ED provider patient had some hallucinations with confusion following an Ativan administration for the CT.  This has been resolved.  Neurology was consulted from the emergency department.  He was started on IV acyclovir vancomycin and ceftriaxone for possible CNS infection until neurology can see him and possibly lumbar puncture obtained.  He will be admitted for further evaluation and treatment.      - Portions of the above HPI were copied from previous encounters and edited as appropriate. PMH as detailed below.     Patient was seen in June/July 2021 for very similar episode.  Patient admitted for altered " mental status and hallucinations.  Extensive work-up was completed at that time including MRI and LP which were all negative.  Patient did improve shortly after admission and likely was due to sleep deprivation with overuse of medications.  There is no diagnosis of encephalitis or meningitis at that time.    Patient presents with very similar episodes on this admission.  History obtained from patient and patient's cousin at bedside.  Patient's cousin pulled myself out into the hallway and is very concerned that the patient took both his pain medication and his mother's pain medication.  Patient denies this but does say that he has not slept very much the past several days.  He denies drug or alcohol use. No nausea, vomiting, headache, new focal weakness.  Patient does see a neurologist outpatient for MS.     Review of Systems   Constitutional: Positive for fatigue.   HENT: Negative.    Eyes: Negative for visual disturbance.   Respiratory: Negative.    Cardiovascular: Negative.    Gastrointestinal: Negative for nausea and vomiting.   Endocrine: Negative.    Genitourinary: Negative.    Musculoskeletal: Negative for neck pain and neck stiffness.   Skin: Negative.    Allergic/Immunologic: Negative.    Neurological: Positive for weakness. Negative for dizziness, tremors, seizures, syncope, facial asymmetry, speech difficulty, light-headedness, numbness and headaches.   Hematological: Negative.    Psychiatric/Behavioral: Positive for confusion and sleep disturbance.          PATIENT HISTORY:  Past Medical History:   Diagnosis Date   • Foot pain    • Hypertension    • Migraine    • MS (multiple sclerosis) (Formerly [facility]) 2015   ,   Past Surgical History:   Procedure Laterality Date   • FOOT FRACTURE SURGERY Left 2002   • FOOT FRACTURE SURGERY Left    • LEG SURGERY Right     Fibula plates and screws   , History reviewed. No pertinent family history.,   Social History     Tobacco Use   • Smoking status: Never Smoker   • Smokeless  tobacco: Never Used   Vaping Use   • Vaping Use: Never used   Substance Use Topics   • Alcohol use: Never   • Drug use: Never   ,   Prior to Admission medications    Medication Sig Start Date End Date Taking? Authorizing Provider   amLODIPine (NORVASC) 10 MG tablet Take 10 mg by mouth Daily.   Yes Cyndi Alberto MD   baclofen (LIORESAL) 20 MG tablet Take 1 tablet by mouth Every 6 (Six) Hours As Needed for Muscle Spasms. 8/12/22  Yes Yg Elliott MD   cyclobenzaprine (FLEXERIL) 10 MG tablet Take 1 tablet by mouth 3 (Three) Times a Day. 6/9/22  Yes Yg Elliott MD   lisinopril (PRINIVIL,ZESTRIL) 10 MG tablet Take 10 mg by mouth Daily.   Yes Cyndi Alberto MD   meclizine (ANTIVERT) 12.5 MG tablet Take 12.5 mg by mouth 3 (Three) Times a Day As Needed for Dizziness.   Yes Cyndi Alberto MD   meloxicam (MOBIC) 15 MG tablet Take 1 tablet by mouth Daily. 8/12/22  Yes Yg Elliott MD   Methylnaltrexone Bromide (Relistor) 150 MG tablet Take 3 tablets by mouth Daily. 8/12/22  Yes Yg Elliott MD   Morphine (MS CONTIN) 15 MG 12 hr tablet Take 1 tablet by mouth 3 (Three) Times a Day. 9/12/22  Yes Yg Elliott MD   Morphine (MS CONTIN) 15 MG 12 hr tablet Take 1 tablet by mouth 3 (Three) Times a Day As Needed for Severe Pain . 8/13/22  Yes Yg Elliott MD   Morphine (MSIR) 15 MG tablet Take 1 tablet by mouth 3 (Three) Times a Day As Needed for Severe Pain . 9/12/22  Yes Yg Elliott MD   Morphine (MSIR) 15 MG tablet Take 1 tablet by mouth 3 (Three) Times a Day As Needed for Severe Pain . 8/13/22  Yes Yg Elliott MD   pregabalin (LYRICA) 300 MG capsule Take 1 capsule by mouth 2 (Two) Times a Day. 8/12/22  Yes Yg Elliott MD   rizatriptan (MAXALT) 10 MG tablet Take 10 mg by mouth 1 (One) Time As Needed for Migraine. May repeat in 2 hours if needed   Yes Cyndi Alberto MD bethanechol (URECHOLINE) 25 MG tablet Take 25 mg by mouth 3 (Three) Times a Day.     Provider, Cyndi, MD    Allergies:  Patient has no known allergies.    Current Facility-Administered Medications   Medication Dose Route Frequency Provider Last Rate Last Admin   • acyclovir (ZOVIRAX) 870 mg in sodium chloride 0.9 % 250 mL IVPB  10 mg/kg (Adjusted) Intravenous Q8H Kristy Navarro APRN   870 mg at 09/23/22 1030   • cefTRIAXone (ROCEPHIN) 2 g in sodium chloride 0.9 % 100 mL IVPB  2 g Intravenous Q12H Kristy Navarro APRN 200 mL/hr at 09/23/22 0947 2 g at 09/23/22 0947   • DOPamine 400 mg in 250 mL D5W infusion  2-20 mcg/kg/min Intravenous Titrated Kristy Navarro APRN   Held at 09/23/22 0606   • erythromycin (ROMYCIN) ophthalmic ointment   Both Eyes Q12H Kristy Navarro APRN       • Pharmacy to Dose acyclovir (ZOVIRAX)   Does not apply Continuous PRN Kristy Navarro APRN       • Pharmacy to dose vancomycin   Does not apply Continuous PRN Lorena Barrera APRN       • sodium chloride 0.9 % flush 10 mL  10 mL Intravenous PRN Lorena Barrera APRN       • sodium chloride 0.9 % infusion  100 mL/hr Intravenous Continuous Kristy Navarro APRN 100 mL/hr at 09/23/22 0606 100 mL/hr at 09/23/22 0606   • Vancomycin HCl 1,250 mg in sodium chloride 0.9 % 250 mL IVPB  1,250 mg Intravenous Q12H Lorena Barrera APRN            ________________________________________________________        OBJECTIVE:    PHYSICAL EXAM:    Constitutional: The patient is in no apparent distress, patient is asleep but does wake easily to voice.  Patient is appropriate, answers all questions. There is no shortness of breath.     PSYCHIATRIC: Mood/affect normal, judgement normal, appropriate    HEENT:  Normocephalic, atraumatic.     Chest: Breathing unlabored    Cardiac: Regular rate and rhythm.     Extremities:  No clubbing, cyanosis or edema.    NEUROLOGICAL:    Cognition:   Oriented to name, hospital, month, says year is 2021  Fund of knowledge decent.  Concentration and attention  normal.   Language normal with normal comprehension, fluent speech, intact repetition and naming.     Cranial nerves;    II - pupils bilaterally equal reacting to light,  No new Visual field deficits;  Fundoscopic exam- Not able to be done, non-dilated exam  III,IV,VI: EOMI with no diplopia  V: Normal facial sensations  VII: No facial asymmetry,  VIII: No New hearing abnormality  IX, X, XI: normal gag and shoulder shrug;  XII: tongue is in the midline.    Sensory:  Intact to light touch in all extremities.     Motor: Strength left lower extremity 4/5, 5-/5 in all other extremities   Deep tendon reflexes: 2/4 and symmetrical in biceps, brachioradialis, triceps, bilateral 2/4 knees and ankles. Both plantars are flexor.    Gait and balance: Deferred.     Physical exam performed by MORTEZA Carmona.  ________________________________________________________   RESULTS REVIEW:    VITAL SIGNS:   Temp:  [98.2 °F (36.8 °C)-98.4 °F (36.9 °C)] 98.2 °F (36.8 °C)  Heart Rate:  [] 76  Resp:  [10-18] 11  BP: ()/() 102/55     LABS:      Lab 09/23/22 0131 09/22/22 1858 09/22/22 1855   WBC 8.20  --  10.20   HEMOGLOBIN 12.5*  --  13.7   HEMATOCRIT 38.4  --  41.3   PLATELETS 206  --  242   NEUTROS ABS 5.20  --  7.40*   LYMPHS ABS 1.80  --  1.50   MONOS ABS 0.80  --  0.90   EOS ABS 0.30  --  0.40   MCV 86.0  --  84.6   LACTATE  --  1.4  --          Lab 09/23/22  0131 09/22/22 1855   SODIUM 136 137   POTASSIUM 3.6 3.9   CHLORIDE 101 99   CO2 26.0 28.0   ANION GAP 9.0 10.0   BUN 14 10   CREATININE 1.24 1.37*   EGFR 73.1 64.8   GLUCOSE 103* 101*   CALCIUM 8.0* 9.0         Lab 09/22/22  1855   TOTAL PROTEIN 6.5   ALBUMIN 3.70   GLOBULIN 2.8   ALT (SGPT) 15   AST (SGOT) 13   BILIRUBIN 0.6   ALK PHOS 91                     Lab 09/22/22  2227   PH, ARTERIAL 7.395   PCO2, ARTERIAL 45.6   PO2 ART 83.1   O2 SATURATION ART 96.0   FIO2 21   HCO3 ART 27.9   BASE EXCESS ART 2.4     UA    Urinalysis 4/30/22 9/22/22   Specific  Gravity, UA 1.021 1.008   Ketones, UA Trace (A) Negative   Blood, UA Negative Negative   Leukocytes, UA Negative Negative   Nitrite, UA Negative Negative   (A) Abnormal value              Lab Results   Component Value Date    TSH 1.050 06/29/2021    HGBA1C 5.1 02/19/2022    [id number] 413 06/26/2021       IMAGING STUDIES:  CT Head Without Contrast    Result Date: 9/22/2022  No acute intracranial abnormality.  Electronically Signed By-Prosper Carrillo On:9/22/2022 8:58 PM This report was finalized on [id number] by  Prosper Carrillo, .    XR Chest 1 View    Result Date: 9/22/2022  IMPRESSION : Low lung volume exam with mild pulmonary vascular congestion and probable dependent atelectasis[  Electronically Signed By-Prosper Carrillo On:9/22/2022 11:22 PM This report was finalized on [id number] by  Prosper Carrillo, .      I reviewed the patient's new clinical results.    ________________________________________________________     PROBLEM LIST:    Confusion    Depression    Chronic pain    Migraine    Hypertension    Hallucination    Multiple sclerosis (HCC)    General weakness    Altered mental status, unspecified altered mental status type    Renal insufficiency    Acute conjunctivitis of both eyes            ASSESSMENT/PLAN:  1. Acute encephalopathy including delirium and hallucinations, resolved.  Likely second to overuse of pain medication (MS Contin, Flexeril and Baclofen), sleep deprivation, and questionable underlying infection (positive blood culture).  Clinically patient does not look like meningitis or CNS infection.  This is not a MS exacerbation.   - CT head reviewed and negative for acute findings  - Check B12, folate, TSH, ammonia  - Drug screen positive for opioids (prescribed)  - Medications & labs reviewed   - Afebrile, neck is supple, no signs of meningitis  - Continue to treat underlying conditions   - Patient may benefit from psychiatry consult  -Recommend to continue holding MS Contin,  Flexeril and baclofen  - We will follow, further recommendations pending clinical course, patient back to his baseline.     2.  Weakness, history of MS  -According to family patient is fairly immobile at home  -PT OT eval and treat, may need rehab    3.  History of MS  -Follow-up with established neurologist outpatient    4.  Positive blood culture  -Possible contamination?  Antibiotics/management per primary    We will follow up.  Please call with any questions or concerns. I discussed the patient's findings and my recommendations with patient, family and nursing staff    KATHY Betancourt  09/23/22  10:49 EDT

## 2022-09-23 NOTE — ED NOTES
Nursing report ED to floor  Jorge Luis Nuno  47 y.o.  male    HPI:   Chief Complaint   Patient presents with   • Altered Mental Status       Admitting doctor:   Amy Staley MD    Admitting diagnosis:   The primary encounter diagnosis was Altered mental status, unspecified altered mental status type. Diagnoses of MS (multiple sclerosis) (HCC), Hallucinations, and Blepharoconjunctivitis of both eyes, unspecified blepharoconjunctivitis type were also pertinent to this visit.    Code status:   Current Code Status     Date Active Code Status Order ID Comments User Context       Prior    Advance Care Planning Activity          Allergies:   Patient has no known allergies.    Isolation:  No active isolations     Fall Risk:  Fall Risk Assessment was completed, and patient is at high risk for falls.   Predictive Model Details         16 (Low) Factor Value    Calculated 9/22/2022 22:06 Age 47    Risk of Fall Model Active Peripheral IV Present     Imaging order in this encounter Present     Musculoskeletal Assessment X     Respiratory Rate 18     Skin Assessment WDL     Financial Class Other     Magnesium not on file     Diastolic BP 61     Drug Use No     Saulo Scale not on file     Peripheral Vascular Assessment WDL     Chloride 99 mmol/L     Albumin 3.7 g/dL     Calcium 9 mg/dL     Creatinine 1.37 mg/dL     Sex Male     Gastrointestinal Assessment WDL     Number of Distinct Medication Classes administered 1     Total Bilirubin 0.6 mg/dL     Cardiac Assessment WDL     Days after Admission 0.18     Potassium 3.9 mmol/L     ALT 15 U/L         Weight:       09/22/22  1728   Weight: 111 kg (245 lb)       Intake and Output    Intake/Output Summary (Last 24 hours) at 9/23/2022 0003  Last data filed at 9/22/2022 2307  Gross per 24 hour   Intake 100 ml   Output --   Net 100 ml       Diet:        Most recent vitals:   Vitals:    09/22/22 2147 09/22/22 2202 09/22/22 2217 09/22/22 2218   BP: 105/58 99/56 101/68    Pulse: 93 94 89     Resp:       Temp:       SpO2: 90% 92%  92%   Weight:       Height:           Active LDAs/IV Access:   Lines, Drains & Airways     Active LDAs     Name Placement date Placement time Site Days    Peripheral IV 09/22/22 1854 Left Antecubital 09/22/22 1854  Antecubital  less than 1    Peripheral IV 09/22/22 2304 Right Hand 09/22/22 2304  Hand  less than 1                Skin Condition:   Skin Assessments (last day)     Date/Time Skin WDL    09/22/22 18:23:10 WDL           Labs (abnormal labs have a star):   Labs Reviewed   COMPREHENSIVE METABOLIC PANEL - Abnormal; Notable for the following components:       Result Value    Glucose 101 (*)     Creatinine 1.37 (*)     All other components within normal limits    Narrative:     GFR Normal >60  Chronic Kidney Disease <60  Kidney Failure <15     CBC WITH AUTO DIFFERENTIAL - Abnormal; Notable for the following components:    Lymphocyte % 14.5 (*)     Neutrophils, Absolute 7.40 (*)     All other components within normal limits   BLOOD GAS, ARTERIAL - Abnormal; Notable for the following components:    CO2 Content 29.3 (*)     All other components within normal limits   POCT GLUCOSE FINGERSTICK - Abnormal; Notable for the following components:    Glucose 112 (*)     All other components within normal limits   URINALYSIS W/ MICROSCOPIC IF INDICATED (NO CULTURE) - Normal    Narrative:     Urine microscopic not indicated.   POC LACTATE - Normal   BLOOD CULTURE   BLOOD CULTURE   MRSA SCREEN, PCR   BLOOD GAS, ARTERIAL   POC LACTATE   CBC AND DIFFERENTIAL    Narrative:     The following orders were created for panel order CBC & Differential.  Procedure                               Abnormality         Status                     ---------                               -----------         ------                     CBC Auto Differential[[id number]]        Abnormal            Final result                 Please view results for these tests on the individual orders.       LOC: Person  and Place    Telemetry:  Telemetry    Cardiac Monitoring Ordered: yes    EKG:   No orders to display       Medications Given in the ED:   Medications   sodium chloride 0.9 % flush 10 mL (has no administration in time range)   acyclovir (ZOVIRAX) 870 mg in sodium chloride 0.9 % 250 mL IVPB (has no administration in time range)   Pharmacy to dose vancomycin (has no administration in time range)   erythromycin (ROMYCIN) ophthalmic ointment 1 application (has no administration in time range)   LORazepam (ATIVAN) injection 1 mg (1 mg Intravenous Given 9/22/22 2004)   cefTRIAXone (ROCEPHIN) 2 g in sodium chloride 0.9 % 100 mL IVPB (0 g Intravenous Stopped 9/22/22 2307)   haloperidol lactate (HALDOL) injection 10 mg (10 mg Intramuscular Given 9/22/22 2240)   vancomycin 2000 mg/500 mL 0.9% NS IVPB ([facility]) (2,000 mg Intravenous New Bag 9/22/22 2306)       Imaging results:  CT Head Without Contrast    Result Date: 9/22/2022  No acute intracranial abnormality.  Electronically Signed ByAmber Carrillo On:9/22/2022 8:58 PM This report was finalized on [id number] by  Prosper Carrillo, .    XR Chest 1 View    Result Date: 9/22/2022  IMPRESSION : Low lung volume exam with mild pulmonary vascular congestion and probable dependent atelectasis[  Electronically Signed ByAmber Carrillo On:9/22/2022 11:22 PM This report was finalized on [id number] by  Prosper Carrillo, .      Social issues:   Social History     Socioeconomic History   • Marital status:    Tobacco Use   • Smoking status: Never Smoker   • Smokeless tobacco: Never Used   Vaping Use   • Vaping Use: Never used   Substance and Sexual Activity   • Alcohol use: Never   • Drug use: Never   • Sexual activity: Defer       NIH Stroke Scale:  Interval: (not recorded)  1a. Level of Consciousness: (not recorded)  1b. LOC Questions: (not recorded)  1c. LOC Commands: (not recorded)  2. Best Gaze: (not recorded)  3. Visual: (not recorded)  4. Facial Palsy: (not  recorded)  5a. Motor Arm, Left: (not recorded)  5b. Motor Arm, Right: (not recorded)  6a. Motor Leg, Left: (not recorded)  6b. Motor Leg, Right: (not recorded)  7. Limb Ataxia: (not recorded)  8. Sensory: (not recorded)  9. Best Language: (not recorded)  10. Dysarthria: (not recorded)  11. Extinction and Inattention (formerly Neglect): (not recorded)    Total (NIH Stroke Scale): (not recorded)     Additional notable assessment information:     Nursing report ED to floor:      Silvia Pierce RN   09/23/22 00:03 EDT

## 2022-09-23 NOTE — PROGRESS NOTES
"Pharmacy Antimicrobial Dosing Service    Subjective:  Jorge Luis Nuno is a 47 y.o.male admitted with altered mental status and increasing weakness. Pharmacy has been consulted to dose Vancomycin and acyclovir  for possible CNS infection.    PMH: MS      Assessment/Plan    1. Day #2 Vancomycin: Goal -600 mcg*h/mL. 2000mg (~23mg/kg DBW) IV x1 dose followedby 1250mg (~14mg/kg DBW) IV q12h.  Pk level ordered for 9/24 at 0300.  Tr level ordered for 9/24 at 1000 prior to fourth total dose.    2. Day #2 Ceftriaxone: 2gm IV q12h.    3.  Day #1 Acyclovir 870mg (10mg/kg DBW) IV q8h    Will continue to monitor drug levels, renal function, culture and sensitivities, and patient clinical status.       Objective:  Relevant clinical data and objective history reviewed:  175.3 cm (69\")   111 kg (245 lb)   Ideal body weight: 70.7 kg (155 lb 13.8 oz)  Adjusted ideal body weight: 86.9 kg (191 lb 8.3 oz)  Body mass index is 36.18 kg/m².        Results from last 7 days   Lab Units 09/22/22  1855   CREATININE mg/dL 1.37*     Estimated Creatinine Clearance: 83.6 mL/min (A) (by C-G formula based on SCr of 1.37 mg/dL (H)).  No intake/output data recorded.    Results from last 7 days   Lab Units 09/22/22  1855   WBC 10*3/mm3 10.20     Temperature    09/22/22 1728 09/23/22 0135   Temp: 98.4 °F (36.9 °C) 98.2 °F (36.8 °C)     Baseline culture/source/susceptibility:  Microbiology Results (last 10 days)       ** No results found for the last 240 hours. **            Anti-Infectives (From admission, onward)      Ordered     Dose/Rate Route Frequency Start Stop    09/23/22 0122  Vancomycin HCl 1,250 mg in sodium chloride 0.9 % 250 mL IVPB        Ordering Provider: Lorena Barrera APRN    1,250 mg Intravenous Every 12 Hours 09/23/22 1100 09/25/22 1059    09/23/22 0036  cefTRIAXone (ROCEPHIN) 2 g in sodium chloride 0.9 % 100 mL IVPB        Ordering Provider: Kristy Navarro APRN    2 g  200 mL/hr over 30 Minutes Intravenous Every 12 " Hours 09/23/22 1000 09/27/22 0959    09/23/22 0126  acyclovir (ZOVIRAX) 870 mg in sodium chloride 0.9 % 250 mL IVPB        Ordering Provider: Kristy Navarro APRN    10 mg/kg × 86.8 kg (Adjusted)  over 60 Minutes Intravenous Every 8 Hours 09/23/22 0900 09/27/22 0859    09/22/22 2206  acyclovir (ZOVIRAX) 870 mg in sodium chloride 0.9 % 250 mL IVPB        Ordering Provider: Lorena Barrera APRN    10 mg/kg × 86.8 kg (Adjusted)  over 60 Minutes Intravenous Once 09/23/22 0100      09/23/22 0027  Pharmacy to Dose acyclovir (ZOVIRAX)        Ordering Provider: Kristy Navarro APRN     Does not apply Continuous PRN 09/23/22 0026 09/27/22 0025    09/22/22 2214  vancomycin 2000 mg/500 mL 0.9% NS IVPB ([facility])        Ordering Provider: Lorena Barrera APRN    2,000 mg Intravenous Once 09/22/22 2300 09/22/22 2306    09/22/22 2206  cefTRIAXone (ROCEPHIN) 2 g in sodium chloride 0.9 % 100 mL IVPB        Ordering Provider: Lorena Barrera APRN    2 g  200 mL/hr over 30 Minutes Intravenous Once 09/22/22 2208 09/22/22 2307    09/22/22 2206  Pharmacy to dose vancomycin        Ordering Provider: Lorena Barrera APRN     Does not apply Continuous PRN 09/22/22 2206 09/24/22 2205            Keith Pearce  09/23/22 02:00 EDT

## 2022-09-23 NOTE — PLAN OF CARE
Problem: Adult Inpatient Plan of Care  Goal: Plan of Care Review  Outcome: Ongoing, Progressing  Flowsheets  Taken 9/23/2022 1544  Plan of Care Reviewed With: patient  Taken 9/23/2022 5685  Outcome Evaluation: Pt os 46 yo male admitted for confusion, weakness, blepharoconjunctivitis of both eyes, ,   Pt d/o encephalopathy possibly due to pain medication overdose or questionable infection due to +blood cultures.  CT head (-) acute changes.  Pt has hx of MS with chronic left side weakness, depression, HTN.  Pt reports indep with mobility, ADLs at baseline.  Pt reports he does not drive and parents assist with transportation.  Pt states he lives a rather sedentary lifestyle and spends most of his time reading or watching tv.  Pt does not work.  On this date, pt presents supine in bed.  Pt able to complete bed mobility with SUP, transfers with CGA, and ambulate > 250 ft with CGA.  Pt exhibiting mild lateral sway requiring CGA for safety with ambulation, however no sign of LOB noted.  Pt reports he has RW and cane as needed at home.  Pt appears close to functional mobility baseline.  Plan return home with family.  Pt can ambulate with nursing staff.  No further inpatient PT needs.

## 2022-09-23 NOTE — PLAN OF CARE
Problem: Adult Inpatient Plan of Care  Goal: Plan of Care Review  Outcome: Ongoing, Progressing  Goal: Patient-Specific Goal (Individualized)  Outcome: Ongoing, Progressing  Goal: Absence of Hospital-Acquired Illness or Injury  Outcome: Ongoing, Progressing  Intervention: Identify and Manage Fall Risk  Recent Flowsheet Documentation  Taken 9/23/2022 1400 by Krystina Pantoja RN  Safety Promotion/Fall Prevention:   activity supervised   safety round/check completed  Taken 9/23/2022 1207 by Krystina Pantoja RN  Safety Promotion/Fall Prevention:   activity supervised   safety round/check completed  Taken 9/23/2022 1158 by Krystina Pantoja RN  Safety Promotion/Fall Prevention: activity supervised  Taken 9/23/2022 1000 by Krystina Pantoja RN  Safety Promotion/Fall Prevention:   activity supervised   safety round/check completed  Taken 9/23/2022 0800 by Krystina Pantoja RN  Safety Promotion/Fall Prevention: activity supervised  Intervention: Prevent Skin Injury  Recent Flowsheet Documentation  Taken 9/23/2022 1400 by Krystina Pantoja RN  Body Position: position changed independently  Taken 9/23/2022 1207 by Krystina Pantoja RN  Body Position: supine  Taken 9/23/2022 1158 by Krystina Pantoja RN  Body Position: supine  Taken 9/23/2022 1000 by Krystina Pantoja RN  Body Position: supine  Taken 9/23/2022 0800 by Krystina Pantoja RN  Body Position: supine  Intervention: Prevent and Manage VTE (Venous Thromboembolism) Risk  Recent Flowsheet Documentation  Taken 9/23/2022 0800 by Krystina Pantoja RN  VTE Prevention/Management:   bilateral   sequential compression devices off  Intervention: Prevent Infection  Recent Flowsheet Documentation  Taken 9/23/2022 1400 by Krystina Pantoja RN  Infection Prevention: hand hygiene promoted  Taken 9/23/2022 1207 by Krystina Pantoja RN  Infection Prevention: personal protective equipment utilized  Taken 9/23/2022 1158 by Krystina Pantoja RN  Infection Prevention: personal protective equipment utilized  Taken 9/23/2022  1000 by Krystina Pantoja RN  Infection Prevention: hand hygiene promoted  Taken 9/23/2022 0800 by Krystina Pantoja RN  Infection Prevention: hand hygiene promoted  Goal: Optimal Comfort and Wellbeing  Outcome: Ongoing, Progressing  Goal: Readiness for Transition of Care  Outcome: Ongoing, Progressing     Problem: Fall Injury Risk  Goal: Absence of Fall and Fall-Related Injury  Outcome: Ongoing, Progressing  Intervention: Identify and Manage Contributors  Recent Flowsheet Documentation  Taken 9/23/2022 1400 by Krystina Pantoja RN  Medication Review/Management: medications reviewed  Taken 9/23/2022 1207 by Krystina Pantoja RN  Medication Review/Management: medications reviewed  Taken 9/23/2022 1158 by Krystina Pantoja RN  Medication Review/Management: medications reviewed  Taken 9/23/2022 1000 by Krystina Pantoja RN  Medication Review/Management: medications reviewed  Taken 9/23/2022 0800 by Krystina Pantoja RN  Medication Review/Management: medications reviewed  Intervention: Promote Injury-Free Environment  Recent Flowsheet Documentation  Taken 9/23/2022 1400 by Krystina Pantoja RN  Safety Promotion/Fall Prevention:   activity supervised   safety round/check completed  Taken 9/23/2022 1207 by Krystina Pantoja RN  Safety Promotion/Fall Prevention:   activity supervised   safety round/check completed  Taken 9/23/2022 1158 by Krystina Pantoja RN  Safety Promotion/Fall Prevention: activity supervised  Taken 9/23/2022 1000 by Krystina Pantoja RN  Safety Promotion/Fall Prevention:   activity supervised   safety round/check completed  Taken 9/23/2022 0800 by Krystina Pantoja RN  Safety Promotion/Fall Prevention: activity supervised     Problem: Skin Injury Risk Increased  Goal: Skin Health and Integrity  Outcome: Ongoing, Progressing  Intervention: Optimize Skin Protection  Recent Flowsheet Documentation  Taken 9/23/2022 1400 by Krystina Pantoja RN  Head of Bed (HOB) Positioning: HOB elevated  Taken 9/23/2022 1207 by Krystina Pantoja RN  Head of Bed  (HOB) Positioning: HOB elevated  Taken 9/23/2022 1158 by Krystina Pantoja RN  Head of Bed (HOB) Positioning: HOB elevated  Taken 9/23/2022 1000 by Krystina Pantoja RN  Head of Bed (HOB) Positioning: HOB elevated  Taken 9/23/2022 0800 by Krystina Pantoja RN  Head of Bed (HOB) Positioning: HOB elevated     Problem: Asthma Comorbidity  Goal: Maintenance of Asthma Control  Outcome: Ongoing, Progressing  Intervention: Maintain Asthma Symptom Control  Recent Flowsheet Documentation  Taken 9/23/2022 1400 by Krystina Pantoja RN  Medication Review/Management: medications reviewed  Taken 9/23/2022 1207 by Krystina Pantoja RN  Medication Review/Management: medications reviewed  Taken 9/23/2022 1158 by Krystina Pantoja RN  Medication Review/Management: medications reviewed  Taken 9/23/2022 1000 by Krystina Pantoja RN  Medication Review/Management: medications reviewed  Taken 9/23/2022 0800 by Krystina Pantoja RN  Medication Review/Management: medications reviewed     Problem: Behavioral Health Comorbidity  Goal: Maintenance of Behavioral Health Symptom Control  Outcome: Ongoing, Progressing  Intervention: Maintain Behavioral Health Symptom Control  Recent Flowsheet Documentation  Taken 9/23/2022 1400 by Krystina Pantoja RN  Medication Review/Management: medications reviewed  Taken 9/23/2022 1207 by Krystina Pantoja RN  Medication Review/Management: medications reviewed  Taken 9/23/2022 1158 by Krystina Pantoja RN  Medication Review/Management: medications reviewed  Taken 9/23/2022 1000 by Krystina Pantoja RN  Medication Review/Management: medications reviewed  Taken 9/23/2022 0800 by Krystina Pantoja RN  Medication Review/Management: medications reviewed     Problem: COPD (Chronic Obstructive Pulmonary Disease) Comorbidity  Goal: Maintenance of COPD Symptom Control  Outcome: Ongoing, Progressing  Intervention: Maintain COPD-Symptom Control  Recent Flowsheet Documentation  Taken 9/23/2022 1400 by Krystina Pantoja RN  Medication Review/Management:  medications reviewed  Taken 9/23/2022 1207 by Krystina Pantoja RN  Medication Review/Management: medications reviewed  Taken 9/23/2022 1158 by Krystina Pantoja RN  Medication Review/Management: medications reviewed  Taken 9/23/2022 1000 by Krystina Pantoja RN  Medication Review/Management: medications reviewed  Taken 9/23/2022 0800 by Krystina Pantoja RN  Medication Review/Management: medications reviewed     Problem: Diabetes Comorbidity  Goal: Blood Glucose Level Within Targeted Range  Outcome: Ongoing, Progressing     Problem: Heart Failure Comorbidity  Goal: Maintenance of Heart Failure Symptom Control  Outcome: Ongoing, Progressing  Intervention: Maintain Heart Failure-Management  Recent Flowsheet Documentation  Taken 9/23/2022 1400 by Krystina Pantoja RN  Medication Review/Management: medications reviewed  Taken 9/23/2022 1207 by Krystina Pantoja RN  Medication Review/Management: medications reviewed  Taken 9/23/2022 1158 by Krystina Pantoja RN  Medication Review/Management: medications reviewed  Taken 9/23/2022 1000 by Krystina Pantoja RN  Medication Review/Management: medications reviewed  Taken 9/23/2022 0800 by Krystina Pantoja RN  Medication Review/Management: medications reviewed     Problem: Hypertension Comorbidity  Goal: Blood Pressure in Desired Range  Outcome: Ongoing, Progressing  Intervention: Maintain Blood Pressure Management  Recent Flowsheet Documentation  Taken 9/23/2022 1400 by Krystina Pantoja RN  Medication Review/Management: medications reviewed  Taken 9/23/2022 1207 by Krystina Pantoja RN  Medication Review/Management: medications reviewed  Taken 9/23/2022 1158 by Krystina Pantoja RN  Medication Review/Management: medications reviewed  Taken 9/23/2022 1000 by Krystina Pantoja RN  Medication Review/Management: medications reviewed  Taken 9/23/2022 0800 by Krystina Pantoja RN  Medication Review/Management: medications reviewed     Problem: Obstructive Sleep Apnea Risk or Actual Comorbidity Management  Goal:  Unobstructed Breathing During Sleep  Outcome: Ongoing, Progressing     Problem: Osteoarthritis Comorbidity  Goal: Maintenance of Osteoarthritis Symptom Control  Outcome: Ongoing, Progressing  Intervention: Maintain Osteoarthritis Symptom Control  Recent Flowsheet Documentation  Taken 9/23/2022 1400 by Krystina Pantoja RN  Medication Review/Management: medications reviewed  Taken 9/23/2022 1207 by Krystina Pantoja RN  Medication Review/Management: medications reviewed  Taken 9/23/2022 1158 by Krystina Pantoja RN  Medication Review/Management: medications reviewed  Taken 9/23/2022 1000 by Krystina Pantoja RN  Medication Review/Management: medications reviewed  Taken 9/23/2022 0800 by Krystina Pantoja RN  Medication Review/Management: medications reviewed     Problem: Pain Chronic (Persistent) (Comorbidity Management)  Goal: Acceptable Pain Control and Functional Ability  Outcome: Ongoing, Progressing  Intervention: Manage Persistent Pain  Recent Flowsheet Documentation  Taken 9/23/2022 1400 by Krystina Pantoja RN  Medication Review/Management: medications reviewed  Taken 9/23/2022 1207 by Krystina Pantoja RN  Medication Review/Management: medications reviewed  Taken 9/23/2022 1158 by Krystina Pantoja RN  Medication Review/Management: medications reviewed  Taken 9/23/2022 1000 by Krystina Pantoja RN  Medication Review/Management: medications reviewed  Taken 9/23/2022 0800 by Krystina Pantoja RN  Medication Review/Management: medications reviewed     Problem: Seizure Disorder Comorbidity  Goal: Maintenance of Seizure Control  Outcome: Ongoing, Progressing     Problem: Pain Acute  Goal: Acceptable Pain Control and Functional Ability  Outcome: Ongoing, Progressing   Goal Outcome Evaluation:

## 2022-09-23 NOTE — NURSING NOTE
RN attempted to apply external catheter to pt when patient awoke and asked why he needed to wear it. Patient alert and oriented to self and able to carry conversation appropriately with RN. RN oriented patient to situation and patient appears to understand. Pt calm, cooperative, drowsy, and compliant at this time.

## 2022-09-23 NOTE — THERAPY EVALUATION
Acute Care - Speech Language Pathology   Swallow Initial Evaluation  Jr     Patient Name: Jorge Luis Nuno  : 1975  MRN: [id number]  Today's Date: 2022               Admit Date: 2022  Patient was not wearing a face mask during this therapy encounter. Therapist used appropriate personal protective equipment including mask, eye protection and gloves.  Mask used was standard procedure mask. Appropriate PPE was worn during the entire therapy session. Hand hygiene was completed before and after therapy session. Patient is not in enhanced droplet precautions.             Visit Dx:     ICD-10-CM ICD-9-CM   1. Altered mental status, unspecified altered mental status type  R41.82 780.97   2. MS (multiple sclerosis) ([facility])  G35 340   3. Hallucinations  R44.3 780.1   4. Blepharoconjunctivitis of both eyes, unspecified blepharoconjunctivitis type  H10.503 372.20     Patient Active Problem List   Diagnosis   • Confusion   • Depression   • Chronic pain   • Migraine   • Hypertension   • COVID-19   • Dizziness   • Orthostasis   • Hallucination   • Multiple sclerosis (HCC)   • General weakness   • Altered mental status, unspecified altered mental status type   • Renal insufficiency   • Acute conjunctivitis of both eyes     Past Medical History:   Diagnosis Date   • Foot pain    • Hypertension    • Migraine    • MS (multiple sclerosis) ([facility])      Past Surgical History:   Procedure Laterality Date   • FOOT FRACTURE SURGERY Left    • FOOT FRACTURE SURGERY Left    • LEG SURGERY Right     Fibula plates and screws       SLP Recommendation and Plan  SLP Swallowing Diagnosis: functional oral phase, functional pharyngeal phase (22 1600)  SLP Diet Recommendation: regular textures, thin liquids (22 1600)     SLP Rec. for Method of Medication Administration: meds whole, meds crushed, as tolerated (22 1600)     Monitor for Signs of Aspiration: yes, notify SLP if any concerns (22 1600)    "  Swallow Criteria for Skilled Therapeutic Interventions Met: demonstrates skilled criteria (09/23/22 1600)     Rehab Potential/Prognosis, Swallowing: good, to achieve stated therapy goals (09/23/22 1600)  Therapy Frequency (Swallow): PRN (09/23/22 1600)  Predicted Duration Therapy Intervention (Days): until discharge (09/23/22 1600)       SWALLOW EVALUATION (last 72 hours)     SLP Adult Swallow Evaluation     Row Name 09/23/22 1600          Document Type evaluation  -CB    Subjective Information no complaints  -CB    Patient Observations alert;cooperative  -CB    Patient/Family/Caregiver Comments/Observations According to nursing patient had acute altered mental status. Patient lives at home with mother.  -CB    Patient Effort good  -CB               Patient Profile Reviewed yes  -CB    Pertinent History Of Current Problem Jorge Luis Nuno is a 47 y.o. male who presented to [facility] on 9/22/2022 complaining of progressive generalized weakness as well as auditory and visual hallucinations with intermittent confusion.  He is currently awake and alert and answers appropriately.  Mother at bedside assisting with history.  Mother reports he had an episode of encephalitis a year ago with similar symptoms.  No hallucinations were noted on exam.  He has a past medical history of multiple sclerosis and chronic left-sided weakness.  Review of records shows he was admitted here with an MS flare in February.  He is on very high-dose MS Contin per inspect 30 mg 3 times a day..  He sees neurology at [facility].  CT head per radiology today showed no acute abnormality, chest x-ray per radiology showed \"low lung volume exam with mild pulmonary vascular congestion and probable dependent atelectasis\".  Urinalysis was negative for infection, creatinine 1.37 glucose 101 WBC 10.2.  In discussion with the ED provider patient had some hallucinations with confusion following an Ativan administration for the CT.  This has " been resolved.  Neurology was consulted from the emergency department.  He was started on IV acyclovir vancomycin and ceftriaxone for possible CNS infection until neurology can see him and possibly lumbar puncture obtained.  He will be admitted for further evaluation and treatment.  Blood cultures pending  -CB    Current Method of Nutrition NPO  -CB               Additional Documentation Pain Scale: FACES Pre/Post-Treatment (Group)  -CB               Pain: FACES Scale, Pretreatment 6-->hurts even more  -CB    Posttreatment Pain Rating 6-->hurts even more  -CB               Dentition Assessment natural, present and adequate  -CB    Secretion Management WNL/WFL  -CB    Mucosal Quality moist, healthy  -CB               Oral Motor General Assessment generalized oral motor weakness  -CB    Oral Motor, Comment Oral mechanism examination revealed patient demonstrates adequate lingual protrusion, retraction, lateralization and A-P movement but noted reduced labial protrusion/retraction.  -CB               Respiratory Support Currently in Use nasal cannula  -CB    O2 Liters 2L  -CB    Eating/Swallowing Skills self-fed  -CB    Positioning During Eating upright in bed  -CB    Utensils Used straw  -CB    Consistencies Trialed regular textures;thin liquids  -CB               Clinical Swallow Evaluation Summary Patient was seen for dysphagia evaluation per failed swallow screen. According to nursing, patient lives at home with his mother. Patient had acute mental status change. Most recent chest x-ray revealed low lung volume exam with mild pulmonary vascular congestion and probable dependent atelectasis. Most recent CT revealed no acute intracranial abnormality.  Patient has his own natural teeth. Oral mechanism examination revealed adequate ROM and mobility of lingual protrusion, retraction, lateralization and A-P movement, however, demonstrated reduced labial protrusion/retraction. Patient was sitting upright in bed prior to  trials. Patient was observed taking successive swallows of thins via straw without s/s of aspiration or complications. Swallow was timely. Laryngeal elevation was adequate. No wet vocal quality was detected. Patient refused mixed consistency and soft solid trials as patient did not like choices provided. Provided trials of regular textured item. Patient demonstrated adequate rotary chewing. Patient exhibited appropriate oral control, manipulation and cohesion of bolus. Noted lingual sweep to adequately clear lateral sulcus. No s/s of aspiration were observed. Patient cleared oral cavity completely between bites. It is recommended that patient receive regular diet with thin liquids at this time. ST will follow to assure safety and tolerance with recommended diet.  -CB               SLP Swallowing Diagnosis functional oral phase;functional pharyngeal phase  -CB    Functional Impact risk of aspiration/pneumonia  -CB    Rehab Potential/Prognosis, Swallowing good, to achieve stated therapy goals  -CB    Swallow Criteria for Skilled Therapeutic Interventions Met demonstrates skilled criteria  -CB               Therapy Frequency (Swallow) PRN  -CB    Predicted Duration Therapy Intervention (Days) until discharge  -CB    SLP Diet Recommendation regular textures;thin liquids  -CB    Oral Care Recommendations Oral Care BID/PRN;Toothbrush  -CB    SLP Rec. for Method of Medication Administration meds whole;meds crushed;as tolerated  -CB    Monitor for Signs of Aspiration yes;notify SLP if any concerns  -CB               Swallow LTGs Swallow Long Term Goal (free text)  -CB    Swallow STGs diet tolerance goal selection (SLP)  -CB    Diet Tolerance Goal Selection (SLP) Swallow Short Term Goal 1  -CB               (LTG) Swallow Patient will tolerate safest and least restrictive diet without complications from aspiration.  -CB    Time Frame (Swallow Long Term Goal) by discharge  -CB               (STG) Swallow 1 Patient will  participate in ongoing meal assessment within 72 hours to assure safety and tolerance with recommended diet.  -CB    Time Frame (Swallow Short Term Goal 1) 1 week  -CB          User Key  (r) = Recorded By, (t) = Taken By, (c) = Cosigned By    Initials Name Effective Dates    Nancie Alejandro, SLP 09/21/21 -                 EDUCATION  The patient has been educated in the following areas:   Dysphagia (Swallowing Impairment) Oral Care/Hydration.        SLP GOALS     Row Name 09/23/22 1600          (STG) Swallow 1 Patient will participate in ongoing meal assessment within 72 hours to assure safety and tolerance with recommended diet.  -CB    Time Frame (Swallow Short Term Goal 1) 1 week  -CB    (LTG) Swallow Patient will tolerate safest and least restrictive diet without complications from aspiration.  -CB    Time Frame (Swallow Long Term Goal) by discharge  -CB          User Key  (r) = Recorded By, (t) = Taken By, (c) = Cosigned By    Initials Name Provider Type    Nancie Alejandro, SLP Speech and Language Pathologist                   Time Calculation:                EVA Beth  9/23/2022

## 2022-09-23 NOTE — PLAN OF CARE
Problem: Adult Inpatient Plan of Care  Goal: Plan of Care Review  Outcome: Ongoing, Progressing  Flowsheets (Taken 9/23/2022 0326)  Progress: no change  Plan of Care Reviewed With: patient  Goal: Patient-Specific Goal (Individualized)  Outcome: Ongoing, Progressing  Goal: Absence of Hospital-Acquired Illness or Injury  Outcome: Ongoing, Progressing  Intervention: Identify and Manage Fall Risk  Recent Flowsheet Documentation  Taken 9/23/2022 0200 by Peri Meyer, YAN  Safety Promotion/Fall Prevention:   safety round/check completed   room organization consistent   activity supervised   assistive device/personal items within reach   clutter free environment maintained   fall prevention program maintained  Intervention: Prevent and Manage VTE (Venous Thromboembolism) Risk  Recent Flowsheet Documentation  Taken 9/23/2022 0200 by Peri Meyer RN  VTE Prevention/Management:   bilateral   sequential compression devices on  Range of Motion: active ROM (range of motion) encouraged  Intervention: Prevent Infection  Recent Flowsheet Documentation  Taken 9/23/2022 0200 by Peri Meyer RN  Infection Prevention:   single patient room provided   personal protective equipment utilized   rest/sleep promoted   hand hygiene promoted  Goal: Optimal Comfort and Wellbeing  Outcome: Ongoing, Progressing  Goal: Readiness for Transition of Care  Outcome: Ongoing, Progressing  Intervention: Mutually Develop Transition Plan  Recent Flowsheet Documentation  Taken 9/23/2022 0319 by Peri Meyer, RN  Equipment Currently Used at Home: none  Taken 9/23/2022 0106 by Peri Meyer RN  Transportation Anticipated: family or friend will provide  Patient/Family Anticipated Services at Transition:   Patient/Family Anticipates Transition to: home with family     Problem: Fall Injury Risk  Goal: Absence of Fall and Fall-Related Injury  Outcome: Ongoing, Progressing  Intervention: Identify and Manage Contributors  Recent Flowsheet Documentation  Taken  9/23/2022 0200 by Peri Meyer RN  Medication Review/Management: medications reviewed  Intervention: Promote Injury-Free Environment  Recent Flowsheet Documentation  Taken 9/23/2022 0200 by Peri Meyer RN  Safety Promotion/Fall Prevention:   safety round/check completed   room organization consistent   activity supervised   assistive device/personal items within reach   clutter free environment maintained   fall prevention program maintained     Problem: Skin Injury Risk Increased  Goal: Skin Health and Integrity  Outcome: Ongoing, Progressing     Problem: Asthma Comorbidity  Goal: Maintenance of Asthma Control  Outcome: Ongoing, Progressing  Intervention: Maintain Asthma Symptom Control  Recent Flowsheet Documentation  Taken 9/23/2022 0200 by Peri Meyer RN  Medication Review/Management: medications reviewed     Problem: Behavioral Health Comorbidity  Goal: Maintenance of Behavioral Health Symptom Control  Outcome: Ongoing, Progressing  Intervention: Maintain Behavioral Health Symptom Control  Recent Flowsheet Documentation  Taken 9/23/2022 0200 by Peri Meyer RN  Medication Review/Management: medications reviewed     Problem: COPD (Chronic Obstructive Pulmonary Disease) Comorbidity  Goal: Maintenance of COPD Symptom Control  Outcome: Ongoing, Progressing  Intervention: Maintain COPD-Symptom Control  Recent Flowsheet Documentation  Taken 9/23/2022 0200 by Peri Meyer RN  Medication Review/Management: medications reviewed     Problem: Diabetes Comorbidity  Goal: Blood Glucose Level Within Targeted Range  Outcome: Ongoing, Progressing     Problem: Heart Failure Comorbidity  Goal: Maintenance of Heart Failure Symptom Control  Outcome: Ongoing, Progressing  Intervention: Maintain Heart Failure-Management  Recent Flowsheet Documentation  Taken 9/23/2022 0200 by Peri Meyer RN  Medication Review/Management: medications reviewed     Problem: Hypertension Comorbidity  Goal: Blood Pressure in Desired Range  Outcome: Ongoing,  Progressing  Intervention: Maintain Blood Pressure Management  Recent Flowsheet Documentation  Taken 9/23/2022 0200 by Peri Meyer RN  Medication Review/Management: medications reviewed     Problem: Obstructive Sleep Apnea Risk or Actual Comorbidity Management  Goal: Unobstructed Breathing During Sleep  Outcome: Ongoing, Progressing     Problem: Osteoarthritis Comorbidity  Goal: Maintenance of Osteoarthritis Symptom Control  Outcome: Ongoing, Progressing  Intervention: Maintain Osteoarthritis Symptom Control  Recent Flowsheet Documentation  Taken 9/23/2022 0200 by Peri Meyer RN  Medication Review/Management: medications reviewed     Problem: Pain Chronic (Persistent) (Comorbidity Management)  Goal: Acceptable Pain Control and Functional Ability  Outcome: Ongoing, Progressing  Intervention: Manage Persistent Pain  Recent Flowsheet Documentation  Taken 9/23/2022 0200 by Peri Meyer RN  Medication Review/Management: medications reviewed     Problem: Seizure Disorder Comorbidity  Goal: Maintenance of Seizure Control  Outcome: Ongoing, Progressing   Goal Outcome Evaluation:  Plan of Care Reviewed With: patient        Progress: no change

## 2022-09-23 NOTE — ED NOTES
Patient close to nurses station, family at bedside, bed alarm placed on patient, call light in reach

## 2022-09-23 NOTE — H&P
"    [facility] Medicine [facility]      Patient Name: Jorge Luis Nuno  : 1975  MRN: [id number]  Primary Care Physician:  Tyrone Kendrick MD  Date of admission: 2022      Subjective      Chief Complaint: altered mental status hallucination    History of Present Illness: Jorge Luis Nuno is a 47 y.o. male who presented to [facility] on 2022 complaining of progressive generalized weakness as well as auditory and visual hallucinations with intermittent confusion.  He is currently awake and alert and answers appropriately.  Mother at bedside assisting with history.  Mother reports he had an episode of encephalitis a year ago with similar symptoms.  No hallucinations were noted on exam.  He has a past medical history of multiple sclerosis and chronic left-sided weakness.  Review of records shows he was admitted here with an MS flare in February.  He is on very high-dose MS Contin per inspect 30 mg 3 times a day..  He sees neurology at [facility].  CT head per radiology today showed no acute abnormality, chest x-ray per radiology showed \"low lung volume exam with mild pulmonary vascular congestion and probable dependent atelectasis\".  Urinalysis was negative for infection, creatinine 1.37 glucose 101 WBC 10.2.  In discussion with the ED provider patient had some hallucinations with confusion following an Ativan administration for the CT.  This has been resolved.  Neurology was consulted from the emergency department.  He was started on IV acyclovir vancomycin and ceftriaxone for possible CNS infection until neurology can see him and possibly lumbar puncture obtained.  He will be admitted for further evaluation and treatment.  Blood cultures pending.    Review of Systems   Constitutional: Negative.   HENT: Negative.    Eyes: Positive for discharge and redness.   Cardiovascular: Negative.    Respiratory: Negative.    Endocrine: Negative.    Hematologic/Lymphatic: Negative.  "   Skin: Negative.    Musculoskeletal: Positive for myalgias.   Gastrointestinal: Negative.    Genitourinary: Negative.    Neurological: Negative.    Psychiatric/Behavioral: Positive for altered mental status and hallucinations.   Allergic/Immunologic: Negative.    All other systems reviewed and are negative.      Personal History     Past Medical History:   Diagnosis Date   • Foot pain    • Hypertension    • Migraine    • MS (multiple sclerosis) (Formerly [facility]) 2015       Past Surgical History:   Procedure Laterality Date   • FOOT FRACTURE SURGERY Left 2002   • FOOT FRACTURE SURGERY Left    • LEG SURGERY Right     Fibula plates and screws       Family History: family history is not on file. Otherwise pertinent FHx was reviewed and not pertinent to current issue.    Social History:  reports that he has never smoked. He has never used smokeless tobacco. He reports that he does not drink alcohol and does not use drugs.    Home Medications:  Prior to Admission Medications     Prescriptions Last Dose Informant Patient Reported? Taking?    amLODIPine (NORVASC) 10 MG tablet  Self Yes No    Take 10 mg by mouth Daily.    baclofen (LIORESAL) 20 MG tablet   No No    Take 1 tablet by mouth Every 6 (Six) Hours As Needed for Muscle Spasms.    bethanechol (URECHOLINE) 25 MG tablet  Self Yes No    Take 25 mg by mouth 3 (Three) Times a Day.    cyclobenzaprine (FLEXERIL) 10 MG tablet   No No    Take 1 tablet by mouth 3 (Three) Times a Day.    lisinopril (PRINIVIL,ZESTRIL) 10 MG tablet   Yes No    Take 10 mg by mouth Daily.    meclizine (ANTIVERT) 12.5 MG tablet  Self Yes No    Take 12.5 mg by mouth 3 (Three) Times a Day As Needed for Dizziness.    meloxicam (MOBIC) 15 MG tablet   No No    Take 1 tablet by mouth Daily.    Methylnaltrexone Bromide (Relistor) 150 MG tablet   No No    Take 3 tablets by mouth Daily.    Morphine (MS CONTIN) 15 MG 12 hr tablet   No No    Take 1 tablet by mouth 3 (Three) Times a Day.    Morphine (MS CONTIN) 15 MG 12  hr tablet   No No    Take 1 tablet by mouth 3 (Three) Times a Day As Needed for Severe Pain .    Morphine (MSIR) 15 MG tablet   No No    Take 1 tablet by mouth 3 (Three) Times a Day As Needed for Severe Pain .    Morphine (MSIR) 15 MG tablet   No No    Take 1 tablet by mouth 3 (Three) Times a Day As Needed for Severe Pain .    pregabalin (LYRICA) 300 MG capsule   No No    Take 1 capsule by mouth 2 (Two) Times a Day.    rizatriptan (MAXALT) 10 MG tablet  Self Yes No    Take 10 mg by mouth 1 (One) Time As Needed for Migraine. May repeat in 2 hours if needed            Allergies:  No Known Allergies    Objective      Vitals:   Temp:  [98.2 °F (36.8 °C)-98.4 °F (36.9 °C)] 98.2 °F (36.8 °C)  Heart Rate:  [] 75  Resp:  [10-18] 10  BP: ()/() 97/54  Flow (L/min):  [2] 2    Physical Exam  Vitals reviewed.   Constitutional:       Appearance: He is obese.   HENT:      Head: Normocephalic and atraumatic.      Right Ear: External ear normal.      Left Ear: External ear normal.      Nose: Nose normal.      Mouth/Throat:      Mouth: Mucous membranes are moist.   Eyes:      General:         Right eye: Discharge present.         Left eye: Discharge present.     Comments: Redness bilaterally   Cardiovascular:      Rate and Rhythm: Normal rate and regular rhythm.      Pulses: Normal pulses.      Heart sounds: Normal heart sounds.   Pulmonary:      Effort: Pulmonary effort is normal.      Breath sounds: Normal breath sounds.   Abdominal:      Palpations: Abdomen is soft.   Genitourinary:     Comments: deferred  Musculoskeletal:         General: Normal range of motion.      Cervical back: Normal range of motion and neck supple.   Skin:     General: Skin is warm and dry.   Neurological:      General: No focal deficit present.      Mental Status: He is alert.      Comments: Oriented x 2   Psychiatric:         Mood and Affect: Mood normal.         Behavior: Behavior normal.         Thought Content: Thought content normal.  "        Judgment: Judgment normal.         Result Review    Result Review:  I have personally reviewed the results from the time of this admission to 9/23/2022 04:23 EDT and agree with these findings:  [x]  Laboratory  []  Microbiology  [x]  Radiology  []  EKG/Telemetry   []  Cardiology/Vascular   []  Pathology  [x]  Old records  []  Other:  Most notable findings include: Urinalysis negative, creatinine 1.37 glucose 101, WBC 10.2        CXR:  \"Low lung volume exam with mild pulmonary vascular congestion and  probable dependent atelectasis[    CT head:  \"IMPRESSION:  No acute intracranial abnormality\"    Assessment & Plan        Active Hospital Problems:  Active Hospital Problems    Diagnosis    • Hallucination    • Confusion    • Multiple sclerosis (HCC)    • Renal insufficiency    • Acute conjunctivitis of both eyes    • General weakness    • Altered mental status, unspecified altered mental status type    • Depression    • Chronic pain    • Migraine    • Hypertension      Plan:    Hallucinations intermittent confusion, etiology undetermined at this time, rule out CNS infection, continue IV acyclovir, IV vancomycin and IV ceftriaxone until blood cultures and or lumbar puncture resulted, neurology consulted    Multiple sclerosis, on Urecholine, neurology consulted    Mild renal insufficiency creatinine 1.37, normal saline at 100 cc/h trend renal function avoid nephrotoxic meds pharmacy to dose vancomycin    Acute conjunctivitis of both eyes, erythromycin ophthalmic ointment ordered    Generalized weakness, fall precautions may be secondary to infectious process and/or progression of multiple sclerosis    Depression, no home meds,    Chronic pain on 30 mg MS Contin 3 times a day per inspect, not reordered at this time given confusion and hallucinations, hold home Flexeril and baclofen for same hold home Lyrica for same reevaluate    Migraine, denies headache on home Maxalt as needed    Hypertension on home amlodipine " and lisinopril monitor BP    Obesity BMI 36 lifestyle management education      DVT prophylaxis:  Mechanical DVT prophylaxis orders are present.    CODE STATUS:    Code Status (Patient has no pulse and is not breathing): CPR (Attempt to Resuscitate)  Medical Interventions (Patient has pulse or is breathing): Full Support    Admission Status:  I believe this patient meets observation status.    I discussed the patient's findings and my recommendations with patient and mother.    This patient has been examined wearing appropriate Personal Protective Equipment . 09/23/22      Signature: Electronically signed by KATHY Nettles, 09/23/22, 4:27 AM EDT.

## 2022-09-24 LAB
ALBUMIN SERPL-MCNC: 3.5 G/DL (ref 3.5–5.2)
ALBUMIN/GLOB SERPL: 1.3 G/DL
ALP SERPL-CCNC: 90 U/L (ref 39–117)
ALT SERPL W P-5'-P-CCNC: 10 U/L (ref 1–41)
ANION GAP SERPL CALCULATED.3IONS-SCNC: 11 MMOL/L (ref 5–15)
AST SERPL-CCNC: 16 U/L (ref 1–40)
BASOPHILS # BLD AUTO: 0 10*3/MM3 (ref 0–0.2)
BASOPHILS NFR BLD AUTO: 0.4 % (ref 0–1.5)
BILIRUB SERPL-MCNC: 0.5 MG/DL (ref 0–1.2)
BUN SERPL-MCNC: 10 MG/DL (ref 6–20)
BUN/CREAT SERPL: 11.5 (ref 7–25)
CALCIUM SPEC-SCNC: 8.7 MG/DL (ref 8.6–10.5)
CHLORIDE SERPL-SCNC: 103 MMOL/L (ref 98–107)
CO2 SERPL-SCNC: 25 MMOL/L (ref 22–29)
CREAT SERPL-MCNC: 0.87 MG/DL (ref 0.76–1.27)
DEPRECATED RDW RBC AUTO: 43.8 FL (ref 37–54)
EGFRCR SERPLBLD CKD-EPI 2021: 108.4 ML/MIN/1.73
EOSINOPHIL # BLD AUTO: 0.1 10*3/MM3 (ref 0–0.4)
EOSINOPHIL NFR BLD AUTO: 1.3 % (ref 0.3–6.2)
ERYTHROCYTE [DISTWIDTH] IN BLOOD BY AUTOMATED COUNT: 14.5 % (ref 12.3–15.4)
GLOBULIN UR ELPH-MCNC: 2.6 GM/DL
GLUCOSE SERPL-MCNC: 105 MG/DL (ref 65–99)
HCT VFR BLD AUTO: 39.1 % (ref 37.5–51)
HGB BLD-MCNC: 12.8 G/DL (ref 13–17.7)
LYMPHOCYTES # BLD AUTO: 0.8 10*3/MM3 (ref 0.7–3.1)
LYMPHOCYTES NFR BLD AUTO: 8.3 % (ref 19.6–45.3)
MAGNESIUM SERPL-MCNC: 1.9 MG/DL (ref 1.6–2.6)
MAGNESIUM SERPL-MCNC: 1.9 MG/DL (ref 1.6–2.6)
MCH RBC QN AUTO: 28.2 PG (ref 26.6–33)
MCHC RBC AUTO-ENTMCNC: 32.8 G/DL (ref 31.5–35.7)
MCV RBC AUTO: 85.9 FL (ref 79–97)
MONOCYTES # BLD AUTO: 0.5 10*3/MM3 (ref 0.1–0.9)
MONOCYTES NFR BLD AUTO: 4.6 % (ref 5–12)
NEUTROPHILS NFR BLD AUTO: 8.4 10*3/MM3 (ref 1.7–7)
NEUTROPHILS NFR BLD AUTO: 85.4 % (ref 42.7–76)
NRBC BLD AUTO-RTO: 0.1 /100 WBC (ref 0–0.2)
PHOSPHATE SERPL-MCNC: 1.9 MG/DL (ref 2.5–4.5)
PLATELET # BLD AUTO: 218 10*3/MM3 (ref 140–450)
PMV BLD AUTO: 8.7 FL (ref 6–12)
POTASSIUM SERPL-SCNC: 3.9 MMOL/L (ref 3.5–5.2)
POTASSIUM SERPL-SCNC: 4.1 MMOL/L (ref 3.5–5.2)
PROT SERPL-MCNC: 6.1 G/DL (ref 6–8.5)
RBC # BLD AUTO: 4.55 10*6/MM3 (ref 4.14–5.8)
SODIUM SERPL-SCNC: 139 MMOL/L (ref 136–145)
WBC NRBC COR # BLD: 9.9 10*3/MM3 (ref 3.4–10.8)

## 2022-09-24 PROCEDURE — 84132 ASSAY OF SERUM POTASSIUM: CPT

## 2022-09-24 PROCEDURE — G0378 HOSPITAL OBSERVATION PER HR: HCPCS

## 2022-09-24 PROCEDURE — 85025 COMPLETE CBC W/AUTO DIFF WBC: CPT | Performed by: INTERNAL MEDICINE

## 2022-09-24 PROCEDURE — 93010 ELECTROCARDIOGRAM REPORT: CPT | Performed by: INTERNAL MEDICINE

## 2022-09-24 PROCEDURE — 83735 ASSAY OF MAGNESIUM: CPT

## 2022-09-24 PROCEDURE — 93005 ELECTROCARDIOGRAM TRACING: CPT | Performed by: INTERNAL MEDICINE

## 2022-09-24 PROCEDURE — 96376 TX/PRO/DX INJ SAME DRUG ADON: CPT

## 2022-09-24 PROCEDURE — 80053 COMPREHEN METABOLIC PANEL: CPT | Performed by: INTERNAL MEDICINE

## 2022-09-24 PROCEDURE — 86140 C-REACTIVE PROTEIN: CPT | Performed by: INTERNAL MEDICINE

## 2022-09-24 PROCEDURE — 84145 PROCALCITONIN (PCT): CPT | Performed by: INTERNAL MEDICINE

## 2022-09-24 PROCEDURE — 85652 RBC SED RATE AUTOMATED: CPT | Performed by: INTERNAL MEDICINE

## 2022-09-24 PROCEDURE — 84100 ASSAY OF PHOSPHORUS: CPT | Performed by: INTERNAL MEDICINE

## 2022-09-24 PROCEDURE — 25010000002 KETOROLAC TROMETHAMINE PER 15 MG: Performed by: INTERNAL MEDICINE

## 2022-09-24 RX ORDER — MAGNESIUM SULFATE HEPTAHYDRATE 40 MG/ML
4 INJECTION, SOLUTION INTRAVENOUS AS NEEDED
Status: DISCONTINUED | OUTPATIENT
Start: 2022-09-24 | End: 2022-09-25 | Stop reason: HOSPADM

## 2022-09-24 RX ORDER — CYCLOBENZAPRINE HCL 10 MG
10 TABLET ORAL 3 TIMES DAILY
Status: DISCONTINUED | OUTPATIENT
Start: 2022-09-24 | End: 2022-09-25 | Stop reason: HOSPADM

## 2022-09-24 RX ORDER — PREGABALIN 100 MG/1
300 CAPSULE ORAL 2 TIMES DAILY
Status: DISCONTINUED | OUTPATIENT
Start: 2022-09-24 | End: 2022-09-25 | Stop reason: HOSPADM

## 2022-09-24 RX ORDER — POTASSIUM CHLORIDE 7.45 MG/ML
10 INJECTION INTRAVENOUS
Status: DISCONTINUED | OUTPATIENT
Start: 2022-09-24 | End: 2022-09-25 | Stop reason: HOSPADM

## 2022-09-24 RX ORDER — POTASSIUM CHLORIDE 1.5 G/1.77G
40 POWDER, FOR SOLUTION ORAL AS NEEDED
Status: DISCONTINUED | OUTPATIENT
Start: 2022-09-24 | End: 2022-09-25 | Stop reason: HOSPADM

## 2022-09-24 RX ORDER — MAGNESIUM SULFATE HEPTAHYDRATE 40 MG/ML
2 INJECTION, SOLUTION INTRAVENOUS AS NEEDED
Status: DISCONTINUED | OUTPATIENT
Start: 2022-09-24 | End: 2022-09-25 | Stop reason: HOSPADM

## 2022-09-24 RX ORDER — MAGNESIUM SULFATE HEPTAHYDRATE 40 MG/ML
2 INJECTION, SOLUTION INTRAVENOUS AS NEEDED
Status: DISCONTINUED | OUTPATIENT
Start: 2022-09-24 | End: 2022-09-24 | Stop reason: SDUPTHER

## 2022-09-24 RX ORDER — AMLODIPINE BESYLATE 5 MG/1
10 TABLET ORAL DAILY
Status: DISCONTINUED | OUTPATIENT
Start: 2022-09-24 | End: 2022-09-25 | Stop reason: HOSPADM

## 2022-09-24 RX ORDER — POTASSIUM CHLORIDE 20 MEQ/1
40 TABLET, EXTENDED RELEASE ORAL AS NEEDED
Status: DISCONTINUED | OUTPATIENT
Start: 2022-09-24 | End: 2022-09-25 | Stop reason: HOSPADM

## 2022-09-24 RX ORDER — POTASSIUM CHLORIDE 20 MEQ/1
40 TABLET, EXTENDED RELEASE ORAL AS NEEDED
Status: DISCONTINUED | OUTPATIENT
Start: 2022-09-24 | End: 2022-09-24 | Stop reason: SDUPTHER

## 2022-09-24 RX ORDER — MORPHINE SULFATE 15 MG/1
15 TABLET, FILM COATED, EXTENDED RELEASE ORAL 3 TIMES DAILY
Status: DISCONTINUED | OUTPATIENT
Start: 2022-09-24 | End: 2022-09-25 | Stop reason: HOSPADM

## 2022-09-24 RX ORDER — POTASSIUM CHLORIDE 1.5 G/1.77G
40 POWDER, FOR SOLUTION ORAL AS NEEDED
Status: DISCONTINUED | OUTPATIENT
Start: 2022-09-24 | End: 2022-09-24 | Stop reason: SDUPTHER

## 2022-09-24 RX ORDER — BACLOFEN 10 MG/1
10 TABLET ORAL EVERY 8 HOURS SCHEDULED
Status: DISCONTINUED | OUTPATIENT
Start: 2022-09-24 | End: 2022-09-25 | Stop reason: HOSPADM

## 2022-09-24 RX ORDER — MAGNESIUM SULFATE 1 G/100ML
1 INJECTION INTRAVENOUS AS NEEDED
Status: DISCONTINUED | OUTPATIENT
Start: 2022-09-24 | End: 2022-09-24 | Stop reason: SDUPTHER

## 2022-09-24 RX ADMIN — KETOROLAC TROMETHAMINE 15 MG: 15 INJECTION, SOLUTION INTRAMUSCULAR; INTRAVENOUS at 05:01

## 2022-09-24 RX ADMIN — KETOROLAC TROMETHAMINE 15 MG: 15 INJECTION, SOLUTION INTRAMUSCULAR; INTRAVENOUS at 14:51

## 2022-09-24 RX ADMIN — ERYTHROMYCIN 1 APPLICATION: 5 OINTMENT OPHTHALMIC at 21:42

## 2022-09-24 RX ADMIN — PREGABALIN 300 MG: 100 CAPSULE ORAL at 18:04

## 2022-09-24 RX ADMIN — Medication 2 PACKET: at 02:51

## 2022-09-24 RX ADMIN — CYCLOBENZAPRINE 10 MG: 10 TABLET, FILM COATED ORAL at 21:41

## 2022-09-24 RX ADMIN — AMLODIPINE BESYLATE 10 MG: 5 TABLET ORAL at 09:21

## 2022-09-24 RX ADMIN — MORPHINE SULFATE 15 MG: 15 TABLET, FILM COATED, EXTENDED RELEASE ORAL at 21:41

## 2022-09-24 RX ADMIN — METHYLNALTREXONE BROMIDE 450 MG: 150 TABLET ORAL at 18:04

## 2022-09-24 RX ADMIN — Medication 10 ML: at 21:42

## 2022-09-24 RX ADMIN — BACLOFEN 10 MG: 10 TABLET ORAL at 18:04

## 2022-09-24 NOTE — PROGRESS NOTES
[facility] Medicine Services Daily Progress Note    Patient Name: Jorge Luis Nuno  : 1975  MRN: [id number]  Primary Care Physician:  Tyroen Kendrick MD  Date of admission: 2022      Subjective      Chief Complaint: Hallucinations altered mental status possibly polypharmacy      Patient Reports he wants to go home.  States he is back to normal.  Patient's temperature and heart rate are severely elevated acutely for 24 hours.  Antibiotics stopped by infectious diseases.  Patient may be withdrawing from his Lyrica opiates or muscle relaxants.  We will slowly reintroduce    ROS negative except as above       Objective      Vitals:   Temp:  [98.1 °F (36.7 °C)-99.5 °F (37.5 °C)] 99.1 °F (37.3 °C)  Heart Rate:  [109-134] 122  Resp:  [14-21] 21  BP: (130-149)/(83-93) 149/93    Physical Exam  Vitals reviewed.   HENT:      Head: Normocephalic.   Cardiovascular:      Rate and Rhythm: Tachycardia present.   Pulmonary:      Effort: Pulmonary effort is normal.   Abdominal:      General: Abdomen is flat.      Palpations: Abdomen is soft.   Musculoskeletal:         General: Normal range of motion.      Cervical back: Normal range of motion.   Skin:     General: Skin is warm.   Neurological:      General: No focal deficit present.      Mental Status: He is alert and oriented to person, place, and time.      Comments: Patient is tremulous but states this is chronic   Psychiatric:         Mood and Affect: Mood normal.         Behavior: Behavior normal.             Result Review    Result Review:  I have personally reviewed the results from the time of this admission to 2022 17:42 EDT and agree with these findings:  [x]  Laboratory  []  Microbiology  []  Radiology  []  EKG/Telemetry   []  Cardiology/Vascular   []  Pathology  []  Old records  []  Other:  Most notable findings include: Phosphorus 1.9.  Patient refusing replacement          Assessment & Plan      Brief Patient Summary:  Jorge Luis  Nurys is a 47 y.o. male who presents with polypharmacy altered mental status hallucinations      amLODIPine, 10 mg, Oral, Daily  baclofen, 10 mg, Oral, Q8H  cyclobenzaprine, 10 mg, Oral, TID  erythromycin, , Both Eyes, Q12H  Methylnaltrexone Bromide, 450 mg, Oral, Daily  Morphine, 15 mg, Oral, TID  pregabalin, 300 mg, Oral, BID       DOPamine, 2-20 mcg/kg/min, Last Rate: Stopped (09/23/22 0606)  sodium chloride, 100 mL/hr, Last Rate: 100 mL/hr (09/23/22 0606)         Active Hospital Problems:  Active Hospital Problems    Diagnosis    • Hallucination    • Multiple sclerosis (HCC)    • General weakness    • Altered mental status, unspecified altered mental status type    • Renal insufficiency    • Acute conjunctivitis of both eyes    • Depression    • Chronic pain    • Migraine    • Hypertension    • Confusion      Plan:     Hallucinations intermittent confusion, etiology undetermined at this time, rule out CNS infection, continue IV acyclovir, IV vancomycin and IV ceftriaxone until blood cultures and or lumbar puncture resulted, neurology consulted.  No concerns for encephalitis/meningitis hence antibiotics were stopped per infectious diseases/neurology     Multiple sclerosis, on Urecholine, neurology consulted.  Instructed to follow-up with his primary neurologist.       Mild renal insufficiency creatinine 1.37, normal saline at 100 cc/h trend renal function avoid nephrotoxic meds pharmacy to dose vancomycin     Acute conjunctivitis of both eyes, erythromycin ophthalmic ointment ordered     Generalized weakness, fall precautions may be secondary to infectious process and/or progression of multiple sclerosis     Depression, no home meds,     Chronic pain on 30 mg MS Contin 3 times a day per inspect, not reordered at this time given confusion and hallucinations, hold home Flexeril and baclofen for same hold home Lyrica for same reevaluate     Migraine, denies headache on home Maxalt as needed     Hypertension on home  amlodipine and lisinopril monitor BP     Obesity BMI 36 lifestyle management education     Refusing phosphorus replacement    Tachycardia low grade temperature elevation  Resume Lyrica and MS Contin for now     DVT prophylaxis:  Mechanical DVT prophylaxis orders are present.     CODE STATUS:    Code Status (Patient has no pulse and is not breathing): CPR (Attempt to Resuscitate)  Medical Interventions (Patient has pulse or is breathing): Full Support     Admission Status:  I believe this patient meets observation status.     I discussed the patient's findings and my recommendations with patient and mother.     This patient has been examined wearing appropriate Personal Protective Equipment  09/24/22      Electronically signed by Manan Hernandez MD, 09/24/22, 17:42 EDT.  [facility] Team

## 2022-09-24 NOTE — PLAN OF CARE
Problem: Adult Inpatient Plan of Care  Goal: Plan of Care Review  Outcome: Ongoing, Progressing  Goal: Patient-Specific Goal (Individualized)  Outcome: Ongoing, Progressing  Goal: Absence of Hospital-Acquired Illness or Injury  Outcome: Ongoing, Progressing  Intervention: Identify and Manage Fall Risk  Recent Flowsheet Documentation  Taken 9/24/2022 1157 by Krystina Pantoja RN  Safety Promotion/Fall Prevention: activity supervised  Taken 9/24/2022 1000 by Krystina Pantoja RN  Safety Promotion/Fall Prevention:   activity supervised   safety round/check completed  Taken 9/24/2022 0800 by Krystina Pantoja RN  Safety Promotion/Fall Prevention:   activity supervised   safety round/check completed  Intervention: Prevent Skin Injury  Recent Flowsheet Documentation  Taken 9/24/2022 1157 by Krystina Pantoja RN  Body Position: position changed independently  Skin Protection: tubing/devices free from skin contact  Taken 9/24/2022 1000 by Krystina Pantoja RN  Body Position: position changed independently  Taken 9/24/2022 0800 by Krystina Pantoja RN  Body Position: position changed independently  Skin Protection: tubing/devices free from skin contact  Intervention: Prevent and Manage VTE (Venous Thromboembolism) Risk  Recent Flowsheet Documentation  Taken 9/24/2022 1157 by Krystina Pantoja RN  Activity Management: activity adjusted per tolerance  VTE Prevention/Management:   bilateral   sequential compression devices off  Taken 9/24/2022 1000 by Krystina Pantoja RN  Activity Management: activity adjusted per tolerance  Taken 9/24/2022 0800 by Krystina Pantoja RN  Activity Management: activity adjusted per tolerance  VTE Prevention/Management:   sequential compression devices off   bilateral  Intervention: Prevent Infection  Recent Flowsheet Documentation  Taken 9/24/2022 1157 by Krystina Pantoja RN  Infection Prevention: environmental surveillance performed  Taken 9/24/2022 1000 by Krystina Pantoja RN  Infection Prevention: environmental  surveillance performed  Taken 9/24/2022 0800 by Krystina Pantoja RN  Infection Prevention: environmental surveillance performed  Goal: Optimal Comfort and Wellbeing  Outcome: Ongoing, Progressing  Intervention: Monitor Pain and Promote Comfort  Recent Flowsheet Documentation  Taken 9/24/2022 1157 by Krystina Pantoja RN  Pain Management Interventions: see MAR  Intervention: Provide Person-Centered Care  Recent Flowsheet Documentation  Taken 9/24/2022 1157 by Krystina Pantoja RN  Trust Relationship/Rapport: care explained  Goal: Readiness for Transition of Care  Outcome: Ongoing, Progressing     Problem: Fall Injury Risk  Goal: Absence of Fall and Fall-Related Injury  Outcome: Ongoing, Progressing  Intervention: Identify and Manage Contributors  Recent Flowsheet Documentation  Taken 9/24/2022 1157 by Krystina Pantoja RN  Medication Review/Management: medications reviewed  Taken 9/24/2022 1000 by Krystina Pantoja RN  Medication Review/Management: medications reviewed  Taken 9/24/2022 0800 by Krystina Pantoja RN  Medication Review/Management: medications reviewed  Intervention: Promote Injury-Free Environment  Recent Flowsheet Documentation  Taken 9/24/2022 1157 by Krystina Pantoja RN  Safety Promotion/Fall Prevention: activity supervised  Taken 9/24/2022 1000 by Krystina Pantoja RN  Safety Promotion/Fall Prevention:   activity supervised   safety round/check completed  Taken 9/24/2022 0800 by Krystina Pantoja RN  Safety Promotion/Fall Prevention:   activity supervised   safety round/check completed     Problem: Skin Injury Risk Increased  Goal: Skin Health and Integrity  Outcome: Ongoing, Progressing  Intervention: Optimize Skin Protection  Recent Flowsheet Documentation  Taken 9/24/2022 1157 by Krystina Pantoja RN  Pressure Reduction Techniques: frequent weight shift encouraged  Head of Bed (HOB) Positioning: HOB elevated  Pressure Reduction Devices: pressure-redistributing mattress utilized  Skin Protection: tubing/devices free from  skin contact  Taken 9/24/2022 1000 by Krystina Pantoja RN  Head of Bed (HOB) Positioning: HOB elevated  Taken 9/24/2022 0800 by Krystina Pantoja RN  Pressure Reduction Techniques: frequent weight shift encouraged  Head of Bed (HOB) Positioning: HOB elevated  Pressure Reduction Devices: pressure-redistributing mattress utilized  Skin Protection: tubing/devices free from skin contact     Problem: Asthma Comorbidity  Goal: Maintenance of Asthma Control  Outcome: Ongoing, Progressing  Intervention: Maintain Asthma Symptom Control  Recent Flowsheet Documentation  Taken 9/24/2022 1157 by Krystina Pantoja RN  Medication Review/Management: medications reviewed  Taken 9/24/2022 1000 by Krystina Pantoja RN  Medication Review/Management: medications reviewed  Taken 9/24/2022 0800 by Krystina Pantoja RN  Medication Review/Management: medications reviewed     Problem: Behavioral Health Comorbidity  Goal: Maintenance of Behavioral Health Symptom Control  Outcome: Ongoing, Progressing  Intervention: Maintain Behavioral Health Symptom Control  Recent Flowsheet Documentation  Taken 9/24/2022 1157 by Krystina Pantoja RN  Medication Review/Management: medications reviewed  Taken 9/24/2022 1000 by Krystina Pantoja RN  Medication Review/Management: medications reviewed  Taken 9/24/2022 0800 by Krystina Pantoja RN  Medication Review/Management: medications reviewed     Problem: COPD (Chronic Obstructive Pulmonary Disease) Comorbidity  Goal: Maintenance of COPD Symptom Control  Outcome: Ongoing, Progressing  Intervention: Maintain COPD-Symptom Control  Recent Flowsheet Documentation  Taken 9/24/2022 1157 by Krystina Pantoja RN  Supportive Measures: active listening utilized  Medication Review/Management: medications reviewed  Taken 9/24/2022 1000 by Krystina Pantoja RN  Medication Review/Management: medications reviewed  Taken 9/24/2022 0800 by Krystina aPntoja RN  Medication Review/Management: medications reviewed     Problem: Diabetes Comorbidity  Goal:  Blood Glucose Level Within Targeted Range  Outcome: Ongoing, Progressing     Problem: Heart Failure Comorbidity  Goal: Maintenance of Heart Failure Symptom Control  Outcome: Ongoing, Progressing  Intervention: Maintain Heart Failure-Management  Recent Flowsheet Documentation  Taken 9/24/2022 1157 by Krystina Pantoja RN  Medication Review/Management: medications reviewed  Taken 9/24/2022 1000 by Krystina Pantoja RN  Medication Review/Management: medications reviewed  Taken 9/24/2022 0800 by Krystina Pantoja RN  Medication Review/Management: medications reviewed     Problem: Hypertension Comorbidity  Goal: Blood Pressure in Desired Range  Outcome: Ongoing, Progressing  Intervention: Maintain Blood Pressure Management  Recent Flowsheet Documentation  Taken 9/24/2022 1157 by Krystina Pantoja RN  Medication Review/Management: medications reviewed  Taken 9/24/2022 1000 by Krystina Pantoja RN  Medication Review/Management: medications reviewed  Taken 9/24/2022 0800 by Krystina Pantoja RN  Medication Review/Management: medications reviewed     Problem: Obstructive Sleep Apnea Risk or Actual Comorbidity Management  Goal: Unobstructed Breathing During Sleep  Outcome: Ongoing, Progressing     Problem: Osteoarthritis Comorbidity  Goal: Maintenance of Osteoarthritis Symptom Control  Outcome: Ongoing, Progressing  Intervention: Maintain Osteoarthritis Symptom Control  Recent Flowsheet Documentation  Taken 9/24/2022 1157 by Krystina Pantoja RN  Activity Management: activity adjusted per tolerance  Medication Review/Management: medications reviewed  Taken 9/24/2022 1000 by Krystina Pantoja RN  Activity Management: activity adjusted per tolerance  Medication Review/Management: medications reviewed  Taken 9/24/2022 0800 by Krystina Pantoja RN  Activity Management: activity adjusted per tolerance  Medication Review/Management: medications reviewed     Problem: Pain Chronic (Persistent) (Comorbidity Management)  Goal: Acceptable Pain Control and  Functional Ability  Outcome: Ongoing, Progressing  Intervention: Manage Persistent Pain  Recent Flowsheet Documentation  Taken 9/24/2022 1157 by Krystina Pantoja RN  Medication Review/Management: medications reviewed  Taken 9/24/2022 1000 by Krystina Pantoja RN  Medication Review/Management: medications reviewed  Taken 9/24/2022 0800 by Krystina Pantoja RN  Medication Review/Management: medications reviewed  Intervention: Develop Pain Management Plan  Recent Flowsheet Documentation  Taken 9/24/2022 1157 by Krystina Pantoja RN  Pain Management Interventions: see MAR  Intervention: Optimize Psychosocial Wellbeing  Recent Flowsheet Documentation  Taken 9/24/2022 1157 by Krystina Pantoja RN  Supportive Measures: active listening utilized  Diversional Activities: television  Family/Support System Care: self-care encouraged  Taken 9/24/2022 0800 by Krystina Pantoja RN  Diversional Activities: television  Family/Support System Care: self-care encouraged     Problem: Seizure Disorder Comorbidity  Goal: Maintenance of Seizure Control  Outcome: Ongoing, Progressing     Problem: Pain Acute  Goal: Acceptable Pain Control and Functional Ability  Outcome: Ongoing, Progressing  Intervention: Prevent or Manage Pain  Recent Flowsheet Documentation  Taken 9/24/2022 1157 by Krystina Pantoja RN  Medication Review/Management: medications reviewed  Taken 9/24/2022 1000 by Krystina Pantoja RN  Medication Review/Management: medications reviewed  Taken 9/24/2022 0800 by Krystina Pantoja RN  Medication Review/Management: medications reviewed  Intervention: Develop Pain Management Plan  Recent Flowsheet Documentation  Taken 9/24/2022 1157 by Krystina Pantoja RN  Pain Management Interventions: see MAR  Intervention: Optimize Psychosocial Wellbeing  Recent Flowsheet Documentation  Taken 9/24/2022 1157 by Krystina Pantoja RN  Supportive Measures: active listening utilized  Diversional Activities: television  Taken 9/24/2022 0800 by Krystina Pantoja RN  Diversional  Activities: television   Goal Outcome Evaluation:

## 2022-09-24 NOTE — PLAN OF CARE
Problem: Adult Inpatient Plan of Care  Goal: Plan of Care Review  Outcome: Ongoing, Progressing  Flowsheets (Taken 9/23/2022 2107)  Progress: no change  Plan of Care Reviewed With: patient   Goal Outcome Evaluation:  Plan of Care Reviewed With: patient        Progress: no change

## 2022-09-25 VITALS
TEMPERATURE: 99.1 F | DIASTOLIC BLOOD PRESSURE: 113 MMHG | SYSTOLIC BLOOD PRESSURE: 152 MMHG | WEIGHT: 247.8 LBS | BODY MASS INDEX: 36.7 KG/M2 | HEIGHT: 69 IN | RESPIRATION RATE: 18 BRPM | OXYGEN SATURATION: 95 % | HEART RATE: 111 BPM

## 2022-09-25 LAB
ALBUMIN SERPL-MCNC: 3.6 G/DL (ref 3.5–5.2)
ALBUMIN/GLOB SERPL: 1.3 G/DL
ALP SERPL-CCNC: 82 U/L (ref 39–117)
ALT SERPL W P-5'-P-CCNC: 9 U/L (ref 1–41)
ANION GAP SERPL CALCULATED.3IONS-SCNC: 12 MMOL/L (ref 5–15)
AST SERPL-CCNC: 14 U/L (ref 1–40)
B PARAPERT DNA SPEC QL NAA+PROBE: NOT DETECTED
B PERT DNA SPEC QL NAA+PROBE: NOT DETECTED
BACTERIA SPEC AEROBE CULT: ABNORMAL
BASOPHILS # BLD AUTO: 0 10*3/MM3 (ref 0–0.2)
BASOPHILS NFR BLD AUTO: 0.3 % (ref 0–1.5)
BILIRUB SERPL-MCNC: 0.5 MG/DL (ref 0–1.2)
BUN SERPL-MCNC: 7 MG/DL (ref 6–20)
BUN/CREAT SERPL: 8 (ref 7–25)
C PNEUM DNA NPH QL NAA+NON-PROBE: NOT DETECTED
CALCIUM SPEC-SCNC: 8.5 MG/DL (ref 8.6–10.5)
CHLORIDE SERPL-SCNC: 101 MMOL/L (ref 98–107)
CO2 SERPL-SCNC: 25 MMOL/L (ref 22–29)
CREAT SERPL-MCNC: 0.87 MG/DL (ref 0.76–1.27)
CRP SERPL-MCNC: 1.31 MG/DL (ref 0–0.5)
DEPRECATED RDW RBC AUTO: 43.3 FL (ref 37–54)
EGFRCR SERPLBLD CKD-EPI 2021: 108.4 ML/MIN/1.73
EOSINOPHIL # BLD AUTO: 0.1 10*3/MM3 (ref 0–0.4)
EOSINOPHIL NFR BLD AUTO: 1.1 % (ref 0.3–6.2)
ERYTHROCYTE [DISTWIDTH] IN BLOOD BY AUTOMATED COUNT: 14.6 % (ref 12.3–15.4)
ERYTHROCYTE [SEDIMENTATION RATE] IN BLOOD: 27 MM/HR (ref 0–15)
FLUAV SUBTYP SPEC NAA+PROBE: NOT DETECTED
FLUBV RNA ISLT QL NAA+PROBE: NOT DETECTED
GLOBULIN UR ELPH-MCNC: 2.7 GM/DL
GLUCOSE SERPL-MCNC: 97 MG/DL (ref 65–99)
GRAM STN SPEC: ABNORMAL
HADV DNA SPEC NAA+PROBE: NOT DETECTED
HCOV 229E RNA SPEC QL NAA+PROBE: NOT DETECTED
HCOV HKU1 RNA SPEC QL NAA+PROBE: NOT DETECTED
HCOV NL63 RNA SPEC QL NAA+PROBE: NOT DETECTED
HCOV OC43 RNA SPEC QL NAA+PROBE: NOT DETECTED
HCT VFR BLD AUTO: 39.4 % (ref 37.5–51)
HGB BLD-MCNC: 13.2 G/DL (ref 13–17.7)
HMPV RNA NPH QL NAA+NON-PROBE: NOT DETECTED
HPIV1 RNA ISLT QL NAA+PROBE: NOT DETECTED
HPIV2 RNA SPEC QL NAA+PROBE: NOT DETECTED
HPIV3 RNA NPH QL NAA+PROBE: NOT DETECTED
HPIV4 P GENE NPH QL NAA+PROBE: NOT DETECTED
ISOLATED FROM: ABNORMAL
LYMPHOCYTES # BLD AUTO: 1.3 10*3/MM3 (ref 0.7–3.1)
LYMPHOCYTES NFR BLD AUTO: 12.3 % (ref 19.6–45.3)
M PNEUMO IGG SER IA-ACNC: NOT DETECTED
MAGNESIUM SERPL-MCNC: 2 MG/DL (ref 1.6–2.6)
MCH RBC QN AUTO: 28 PG (ref 26.6–33)
MCHC RBC AUTO-ENTMCNC: 33.5 G/DL (ref 31.5–35.7)
MCV RBC AUTO: 83.5 FL (ref 79–97)
MONOCYTES # BLD AUTO: 0.7 10*3/MM3 (ref 0.1–0.9)
MONOCYTES NFR BLD AUTO: 6.8 % (ref 5–12)
NEUTROPHILS NFR BLD AUTO: 79.5 % (ref 42.7–76)
NEUTROPHILS NFR BLD AUTO: 8.3 10*3/MM3 (ref 1.7–7)
NRBC BLD AUTO-RTO: 0.1 /100 WBC (ref 0–0.2)
PHOSPHATE SERPL-MCNC: 2.4 MG/DL (ref 2.5–4.5)
PLATELET # BLD AUTO: 218 10*3/MM3 (ref 140–450)
PMV BLD AUTO: 8.1 FL (ref 6–12)
POTASSIUM SERPL-SCNC: 3.4 MMOL/L (ref 3.5–5.2)
PROCALCITONIN SERPL-MCNC: 0.03 NG/ML (ref 0–0.25)
PROT SERPL-MCNC: 6.3 G/DL (ref 6–8.5)
RBC # BLD AUTO: 4.72 10*6/MM3 (ref 4.14–5.8)
RHINOVIRUS RNA SPEC NAA+PROBE: NOT DETECTED
RSV RNA NPH QL NAA+NON-PROBE: NOT DETECTED
SARS-COV-2 RNA NPH QL NAA+NON-PROBE: NOT DETECTED
SODIUM SERPL-SCNC: 138 MMOL/L (ref 136–145)
WBC NRBC COR # BLD: 10.5 10*3/MM3 (ref 3.4–10.8)

## 2022-09-25 PROCEDURE — 0202U NFCT DS 22 TRGT SARS-COV-2: CPT | Performed by: INTERNAL MEDICINE

## 2022-09-25 PROCEDURE — 25010000002 CEFTRIAXONE PER 250 MG: Performed by: INTERNAL MEDICINE

## 2022-09-25 PROCEDURE — G0378 HOSPITAL OBSERVATION PER HR: HCPCS

## 2022-09-25 RX ORDER — METOPROLOL SUCCINATE 50 MG/1
50 TABLET, EXTENDED RELEASE ORAL DAILY
Qty: 30 TABLET | Refills: 0 | Status: SHIPPED | OUTPATIENT
Start: 2022-09-25 | End: 2023-01-02

## 2022-09-25 RX ORDER — METOPROLOL SUCCINATE 50 MG/1
50 TABLET, EXTENDED RELEASE ORAL
Status: DISCONTINUED | OUTPATIENT
Start: 2022-09-25 | End: 2022-09-25 | Stop reason: HOSPADM

## 2022-09-25 RX ORDER — HYDRALAZINE HYDROCHLORIDE 20 MG/ML
10 INJECTION INTRAMUSCULAR; INTRAVENOUS EVERY 6 HOURS PRN
Status: DISCONTINUED | OUTPATIENT
Start: 2022-09-25 | End: 2022-09-25 | Stop reason: HOSPADM

## 2022-09-25 RX ADMIN — CYCLOBENZAPRINE 10 MG: 10 TABLET, FILM COATED ORAL at 08:30

## 2022-09-25 RX ADMIN — PREGABALIN 300 MG: 100 CAPSULE ORAL at 08:30

## 2022-09-25 RX ADMIN — BACLOFEN 10 MG: 10 TABLET ORAL at 05:38

## 2022-09-25 RX ADMIN — MORPHINE SULFATE 15 MG: 15 TABLET, FILM COATED, EXTENDED RELEASE ORAL at 08:30

## 2022-09-25 RX ADMIN — Medication 10 ML: at 08:31

## 2022-09-25 RX ADMIN — METHYLNALTREXONE BROMIDE 450 MG: 150 TABLET ORAL at 08:30

## 2022-09-25 RX ADMIN — AMLODIPINE BESYLATE 10 MG: 5 TABLET ORAL at 08:30

## 2022-09-25 RX ADMIN — CEFTRIAXONE 2 G: 2 INJECTION, POWDER, FOR SOLUTION INTRAMUSCULAR; INTRAVENOUS at 10:45

## 2022-09-25 RX ADMIN — ERYTHROMYCIN 1 APPLICATION: 5 OINTMENT OPHTHALMIC at 08:30

## 2022-09-25 RX ADMIN — METOPROLOL SUCCINATE 50 MG: 50 TABLET, EXTENDED RELEASE ORAL at 11:38

## 2022-09-25 NOTE — PLAN OF CARE
Problem: Adult Inpatient Plan of Care  Goal: Plan of Care Review  Outcome: Ongoing, Progressing  Flowsheets (Taken 9/25/2022 0210)  Progress: no change  Plan of Care Reviewed With: patient  Goal: Patient-Specific Goal (Individualized)  Outcome: Ongoing, Progressing  Goal: Absence of Hospital-Acquired Illness or Injury  Outcome: Ongoing, Progressing  Intervention: Identify and Manage Fall Risk  Description: Perform standard risk assessment on admission using a validated tool or comprehensive approach appropriate to the patient; reassess fall risk frequently, with change in status or transfer to another level of care.  Communicate fall injury risk to interprofessional healthcare team.  Determine need for increased observation, equipment and environmental modification, such as low bed, signage and supportive, nonskid footwear.  Adjust safety measures to individual developmental age, stage and identified risk factors.  Reinforce the importance of safety and physical activity with patient and family.  Perform regular intentional rounding to assess need for position change, pain assessment and personal needs, including assistance with toileting.  Recent Flowsheet Documentation  Taken 9/25/2022 0000 by Irish Cordova RN  Safety Promotion/Fall Prevention:   safety round/check completed   room organization consistent   nonskid shoes/slippers when out of bed   fall prevention program maintained   clutter free environment maintained   assistive device/personal items within reach  Taken 9/24/2022 2200 by Irish Cordova, RN  Safety Promotion/Fall Prevention:   safety round/check completed   room organization consistent   nonskid shoes/slippers when out of bed   lighting adjusted   fall prevention program maintained   assistive device/personal items within reach   clutter free environment maintained  Intervention: Prevent Skin Injury  Description: Perform a screening for skin injury risk, such as pressure or moisture  associated skin damage on admission and at regular intervals throughout hospital stay.  Keep all areas of skin (especially folds) clean and dry.  Maintain adequate skin hydration.  Relieve and redistribute pressure and protect bony prominences; implement measures based on patient-specific risk factors.  Match turning and repositioning schedule to clinical condition.  Encourage weight shift frequently; assist with reposition if unable to complete independently.  Float heels off bed; avoid pressure on the Achilles tendon.  Keep skin free from extended contact with medical devices.  Encourage functional activity and mobility, as early as tolerated.  Use aids (e.g., slide boards, mechanical lift) during transfer.  Recent Flowsheet Documentation  Taken 9/25/2022 0000 by Irsih Cordova RN  Body Position: position changed independently  Taken 9/24/2022 2100 by Irish Cordova RN  Skin Protection: tubing/devices free from skin contact  Intervention: Prevent and Manage VTE (Venous Thromboembolism) Risk  Description: Assess for VTE (venous thromboembolism) risk.  Encourage and assist with early ambulation.  Initiate and maintain compression or other therapy, as indicated, based on identified risk in accordance with organizational protocol and provider order.  Encourage both active and passive leg exercises while in bed, if unable to ambulate.  Recent Flowsheet Documentation  Taken 9/25/2022 0000 by Irish Cordova RN  Activity Management: activity adjusted per tolerance  VTE Prevention/Management:   bilateral   sequential compression devices on  Range of Motion: active ROM (range of motion) encouraged  Taken 9/24/2022 2100 by Irish Cordova RN  VTE Prevention/Management:   bilateral   sequential compression devices off  Range of Motion: active ROM (range of motion) encouraged  Intervention: Prevent Infection  Description: Maintain skin and mucous membrane integrity; promote hand, oral and pulmonary  hygiene.  Optimize fluid balance, nutrition, sleep and glycemic control to maximize infection resistance.  Identify potential sources of infection early to prevent or mitigate progression of infection (e.g., wound, lines, devices).  Evaluate ongoing need for invasive devices; remove promptly when no longer indicated.  Recent Flowsheet Documentation  Taken 9/25/2022 0000 by Irish Cordova RN  Infection Prevention:   single patient room provided   rest/sleep promoted   hand hygiene promoted   environmental surveillance performed  Taken 9/24/2022 2200 by Irish Cordova RN  Infection Prevention:   single patient room provided   rest/sleep promoted   hand hygiene promoted   environmental surveillance performed  Taken 9/24/2022 2100 by Irish Cordova RN  Infection Prevention:   single patient room provided   rest/sleep promoted   hand hygiene promoted   environmental surveillance performed  Goal: Optimal Comfort and Wellbeing  Outcome: Ongoing, Progressing  Intervention: Monitor Pain and Promote Comfort  Description: Assess pain level, treatment efficacy and patient response at regular intervals using a consistent pain scale.  Consider the presence and impact of preexisting chronic pain.  Encourage patient and caregiver involvement in pain assessment, interventions and safety measures.  Recent Flowsheet Documentation  Taken 9/25/2022 0000 by Irish Cordova RN  Pain Management Interventions:   see MAR   quiet environment facilitated   care clustered  Taken 9/24/2022 2100 by Irish Cordova RN  Pain Management Interventions:   see MAR   quiet environment facilitated   care clustered  Intervention: Provide Person-Centered Care  Description: Use a family-focused approach to care.  Develop trust and rapport by proactively providing information, encouraging questions, addressing concerns and offering reassurance.  Acknowledge emotional response to hospitalization.  Recognize and utilize personal coping  strategies.  Honor spiritual and cultural preferences.  Recent Flowsheet Documentation  Taken 9/25/2022 0000 by Irish Cordova RN  Trust Relationship/Rapport: care explained  Goal: Readiness for Transition of Care  Outcome: Ongoing, Progressing     Problem: Fall Injury Risk  Goal: Absence of Fall and Fall-Related Injury  Outcome: Ongoing, Progressing  Intervention: Identify and Manage Contributors  Description: Develop a fall prevention plan with the patient and caregiver/family.  Provide reorientation, appropriate sensory stimulation and routines with changes in mental status to decrease risk of fall.  Promote use of personal vision and auditory aids.  Assess assistance level required for safe and effective self-care; provide support as needed, such as toileting, mobilization. For age 65 and older, implement timed toileting with assistance.  Encourage physical activity, such as performance of mobility and self-care at highest level of patient ability, multicomponent exercise program and provision of appropriate assistive devices.  If fall occurs, assess the severity of injury; implement fall injury protocol. Determine the cause and revise fall injury prevention plan.  Regularly review medication contribution to fall risk; adjust medication administration times to minimize risk of falling.  Consider risk related to polypharmacy and age.  Balance adequate pain management with potential for oversedation.  Recent Flowsheet Documentation  Taken 9/25/2022 0000 by Irish Cordova RN  Medication Review/Management: medications reviewed  Self-Care Promotion: independence encouraged  Taken 9/24/2022 2200 by Irish Cordova, RN  Medication Review/Management: medications reviewed  Taken 9/24/2022 2100 by Irish Cordova, RN  Medication Review/Management: medications reviewed  Self-Care Promotion: independence encouraged  Intervention: Promote Injury-Free Environment  Description: Provide a safe, barrier-free environment  that encourages independent activity.  Keep care area uncluttered and well-lighted.  Determine need for increased observation or monitoring.  Avoid use of devices that minimize mobility, such as restraints or indwelling urinary catheter.  Recent Flowsheet Documentation  Taken 9/25/2022 0000 by Irish Cordova RN  Safety Promotion/Fall Prevention:   safety round/check completed   room organization consistent   nonskid shoes/slippers when out of bed   fall prevention program maintained   clutter free environment maintained   assistive device/personal items within reach  Taken 9/24/2022 2200 by Irish Cordova RN  Safety Promotion/Fall Prevention:   safety round/check completed   room organization consistent   nonskid shoes/slippers when out of bed   lighting adjusted   fall prevention program maintained   assistive device/personal items within reach   clutter free environment maintained     Problem: Skin Injury Risk Increased  Goal: Skin Health and Integrity  Outcome: Ongoing, Progressing  Intervention: Optimize Skin Protection  Description: Perform a full pressure injury risk assessment, as indicated by screening, upon admission to care unit.  Reassess skin (injury risk, full inspection) frequently (e.g., scheduled interval, with change in condition) to provide optimal early detection and prevention.  Maintain adequate tissue perfusion (e.g., encourage fluid balance; avoid crossing legs, constrictive clothing or devices) to promote tissue oxygenation.  Maintain head of bed at lowest degree of elevation tolerated, considering medical condition and other restrictions.  Avoid positioning onto an area that remains reddened.  Minimize incontinence and moisture (e.g., toileting schedule; moisture-wicking pad, diaper or incontinence collection device; skin moisture barrier).  Cleanse skin promptly and gently when soiled utilizing a pH-balanced cleanser.  Relieve and redistribute pressure (e.g., scheduled position  changes, weight shifts, use of support surface, medical device repositioning, protective dressing application, use of positioning device, microclimate control, use of pressure-injury-monitor  Encourage increased activity, such as sitting in a chair at the bedside or early mobilization, when able to tolerate.  Recent Flowsheet Documentation  Taken 9/25/2022 0000 by Irish Cordova RN  Head of Bed (HOB) Positioning: [facility] elevated  Pressure Reduction Devices: pressure-redistributing mattress utilized  Taken 9/24/2022 2100 by Irish Cordova RN  Pressure Reduction Techniques: frequent weight shift encouraged  Head of Bed (HOB) Positioning: [facility] elevated  Pressure Reduction Devices: pressure-redistributing mattress utilized  Skin Protection: tubing/devices free from skin contact     Problem: Asthma Comorbidity  Goal: Maintenance of Asthma Control  Outcome: Ongoing, Progressing  Intervention: Maintain Asthma Symptom Control  Description: Evaluate adherence to self-management (asthma action plan), such as medication, symptom-control, trigger-avoidance and self-monitoring.  Advocate for continuation of home regimen, including medication, method of delivery, schedule and symptom monitoring; acknowledge preferred modality and routine.  Minimize exposure to potential triggers, such as perfume, cleaning chemicals and all types of smoke.  Assess for proper use of inhaled medication and delivery technique; assist or reinstruct if needed.  Evaluate effectiveness of coping skills; encourage expression of feelings, expectations and concerns related to disease management and quality of life; reinforce education to enhance management plan and wellbeing.  Recent Flowsheet Documentation  Taken 9/25/2022 0000 by Irish Cordova RN  Medication Review/Management: medications reviewed  Taken 9/24/2022 2200 by Irish Cordova RN  Medication Review/Management: medications reviewed  Taken 9/24/2022 2100 by Irish Cordova  RN  Medication Review/Management: medications reviewed     Problem: Behavioral Health Comorbidity  Goal: Maintenance of Behavioral Health Symptom Control  Outcome: Ongoing, Progressing  Intervention: Maintain Behavioral Health Symptom Control  Description: Confirm mental health diagnosis and current treatment.  Evaluate adherence to previously identified self-management plan.  Advocate continuation of home strategies, including medication.  Evaluate effectiveness of self-management strategies and coping skills.  Communicate with providers to ensure continuity and follow-up at transition.  Recent Flowsheet Documentation  Taken 9/25/2022 0000 by Irish Cordova RN  Medication Review/Management: medications reviewed  Taken 9/24/2022 2200 by Irish Cordova RN  Medication Review/Management: medications reviewed  Taken 9/24/2022 2100 by Irish Cordova RN  Medication Review/Management: medications reviewed     Problem: COPD (Chronic Obstructive Pulmonary Disease) Comorbidity  Goal: Maintenance of COPD Symptom Control  Outcome: Ongoing, Progressing  Intervention: Maintain COPD-Symptom Control  Description: Evaluate adherence to management plan (e.g., medication, trigger avoidance, infection prevention, self-monitoring).  Advocate for continuation of home regimen, including medication, method of delivery, schedule and symptom monitoring.  Anticipate the need for breathing techniques and activity pacing to minimize fatigue and breathlessness.  Assess for proper use of inhaled medication and delivery technique; assist or reinstruct if needed.  Evaluate effectiveness of coping skills; encourage expression of feelings, expectations and concerns related to disease management and quality of life; reinforce education to enhance management plan and wellbeing.  Recent Flowsheet Documentation  Taken 9/25/2022 0000 by Irish Cordova RN  Supportive Measures: active listening utilized  Medication Review/Management: medications  reviewed  Taken 9/24/2022 2200 by Irish Cordova RN  Medication Review/Management: medications reviewed  Taken 9/24/2022 2100 by Irish Cordova RN  Supportive Measures: active listening utilized  Medication Review/Management: medications reviewed     Problem: Diabetes Comorbidity  Goal: Blood Glucose Level Within Targeted Range  Outcome: Ongoing, Progressing  Intervention: Monitor and Manage Glycemia  Description: Establish target blood glucose levels based on patient-specific factors, such as age, diabetes-related complications and illness severity.  Document blood glucose levels and monitor trend; advocate for adjustment to keep within targeted range.  Provide pharmacologic therapy to maintain blood glucose levels within targeted range.  Check blood glucose level if there is a change in mental or cognitive status.  Recognize, treat and document hypoglycemia event and potential cause.  Avoid hypoglycemic episodes by advocating for insulin dose adjustment when there is a change in condition, such as illness severity, decreased oral intake, missed or refused meals and snacks, as well as medication change that may include steroid tape  Recent Flowsheet Documentation  Taken 9/25/2022 0000 by Irish Cordova RN  Glycemic Management: blood glucose monitored  Taken 9/24/2022 2100 by Irish Cordova RN  Glycemic Management: blood glucose monitored     Problem: Heart Failure Comorbidity  Goal: Maintenance of Heart Failure Symptom Control  Outcome: Ongoing, Progressing  Intervention: Maintain Heart Failure-Management  Description: Evaluate adherence to home heart failure self-care regimen (e.g., medication, fluid balance, sodium intake, daily weight, physical activity, telemonitoring, support).  Advocate continuation of home medication and schedule.  Consider pharmacologic therapy administration time and effects (e.g., avoid giving diuretic prior to bedtime or nitrates on empty stomach).  Monitor response to  pharmacologic therapy, including weight fluctuations, blood pressure and electrolyte levels.  Monitor for signs and symptoms of anxiety and depression, including severity and duration; if present, provide psychosocial support.  Consider need for heart failure clinic or palliative care consult.  Recent Flowsheet Documentation  Taken 9/25/2022 0000 by Irish Cordova RN  Medication Review/Management: medications reviewed  Taken 9/24/2022 2200 by Irish Cordova RN  Medication Review/Management: medications reviewed  Taken 9/24/2022 2100 by Irish Cordova RN  Medication Review/Management: medications reviewed     Problem: Hypertension Comorbidity  Goal: Blood Pressure in Desired Range  Outcome: Ongoing, Progressing  Intervention: Maintain Blood Pressure Management  Description: Evaluate adherence to home antihypertensive regimen (e.g., exercise and activity, diet modification, medication).  Provide scheduled antihypertensive medication; consider administration time and effects (e.g., avoid giving diuretic prior to bedtime).  Monitor response to antihypertensive medication therapy (e.g., blood pressure, electrolyte levels, medication effects).  Minimize risk of orthostatic hypotension; encourage caution with position changes, particularly if elderly.  Recent Flowsheet Documentation  Taken 9/25/2022 0000 by Irish Cordova RN  Syncope Management: position changed slowly  Medication Review/Management: medications reviewed  Taken 9/24/2022 2200 by Irish Cordova RN  Medication Review/Management: medications reviewed  Taken 9/24/2022 2100 by Irish Cordova RN  Syncope Management: position changed slowly  Medication Review/Management: medications reviewed     Problem: Obstructive Sleep Apnea Risk or Actual Comorbidity Management  Goal: Unobstructed Breathing During Sleep  Outcome: Ongoing, Progressing     Problem: Osteoarthritis Comorbidity  Goal: Maintenance of Osteoarthritis Symptom Control  Outcome:  Ongoing, Progressing  Intervention: Maintain Osteoarthritis Symptom Control  Description: Evaluate adherence to self-management plan, such as medication, exercise and weight management.  Advocate for continuation of home regimen, such as medication, physical activity and thermal agents; monitor response.  Encourage participation in functional activities, such as mobility and ADLs (activities of daily living) to minimize decline associated with inactivity.  Facilitate use of patient-specific assistive devices, equipment or orthoses.  Evaluate effectiveness of coping skills; encourage expression of feelings, expectations and concerns related to disease management and quality of life; reinforce education to enhance management plan and wellbeing.  Recent Flowsheet Documentation  Taken 9/25/2022 0000 by Irish Cordova RN  Activity Management: activity adjusted per tolerance  Medication Review/Management: medications reviewed  Taken 9/24/2022 2200 by Irish Cordova RN  Medication Review/Management: medications reviewed  Taken 9/24/2022 2100 by Irish Cordova RN  Medication Review/Management: medications reviewed     Problem: Pain Chronic (Persistent) (Comorbidity Management)  Goal: Acceptable Pain Control and Functional Ability  Outcome: Ongoing, Progressing  Intervention: Manage Persistent Pain  Description: Evaluate pain level, effect of treatment and patient response at regular intervals.  Minimize pain stimuli; coordinate care and adjust environment (e.g., light, noise, unnecessary movement); promote sleep/rest.  Match pharmacologic analgesia to severity and type of pain mechanism (e.g., neuropathic, muscle, inflammatory); consider multimodal approach (e.g., nonopioid, opioid, adjuvant).  Provide medication at regular intervals; titrate to patient response.  Manage breakthrough pain with additional doses; consider rotation or switching medication.  Monitor for signs of substance tolerance (increased dose to  reach desired effect, decreased effect with same dose).  Avoid abrupt withdrawal of medication, especially agents capable of causing physical dependence.  Manage medication-induced effects, such as constipation, nausea, pruritus, urinary retention, somnolence and dizziness.  Provide multimodal treatment interventions, such as physical activity, therapeutic exercise, yoga, TENS (transcutaneous electrical nerve stimulation) and manual therapy.  Train in functional activity modifications, such as body mechanics, posture, ergonomics, energy conservation and activity pacing.  Consider addition of complementary or alternative therapy, such as acupuncture, hypnosis or therapeutic touch.  Recent Flowsheet Documentation  Taken 9/25/2022 0000 by Irish Cordova RN  Sleep/Rest Enhancement:   room darkened   relaxation techniques promoted   regular sleep/rest pattern promoted   noise level reduced   natural light exposure provided   consistent schedule promoted   awakenings minimized  Medication Review/Management: medications reviewed  Taken 9/24/2022 2200 by Irish Cordova RN  Medication Review/Management: medications reviewed  Taken 9/24/2022 2100 by Irish Cordova RN  Medication Review/Management: medications reviewed  Intervention: Develop Pain Management Plan  Description: Acknowledge patient as the expert in pain self-management.  Use a consistent, validated tool for pain assessment; include function and quality of life.  Evaluate risk for opioid use and dependence.  Set pain management goals; determine acceptable level of discomfort to allow for maximal functioning and quality of life.  Determine [id number] pain management plan, including both pharmacologic and nonpharmacologic measures.  Identify and integrate past successful treatment measures, if able.  Encourage patient and caregiver involvement in pain assessment, interventions and safety measures.  Re-evaluate plan regularly.  Recent Flowsheet  Documentation  Taken 9/25/2022 0000 by Irish Cordova RN  Pain Management Interventions:   see MAR   quiet environment facilitated   care clustered  Taken 9/24/2022 2100 by Irish Cordova RN  Pain Management Interventions:   see MAR   quiet environment facilitated   care clustered  Intervention: Optimize Psychosocial Wellbeing  Description: Facilitate patient’s self-control over pain by providing pain information and allowing choices in treatment.  Consider and address emotional response to pain.  Explore and promote use of coping strategies; address barriers to successful coping.  Evaluate and assist with psychosocial, cultural and spiritual factors impacting pain.  Modify pain perception by using techniques, such as distraction, mindfulness, guided imagery, meditation or music.  Assess and monitor for signs and symptoms of behavioral health concerns, such as unhealthy substance use, depression and suicidal ideation.  Consider referral for ongoing coping support, such as cognitive behavioral therapy and mindfulness-based stress reduction.  Recent Flowsheet Documentation  Taken 9/25/2022 0000 by Irish Cordova RN  Supportive Measures: active listening utilized  Diversional Activities: television  Family/Support System Care: self-care encouraged  Taken 9/24/2022 2100 by Irish Cordova RN  Supportive Measures: active listening utilized  Diversional Activities: television  Family/Support System Care: self-care encouraged     Problem: Seizure Disorder Comorbidity  Goal: Maintenance of Seizure Control  Outcome: Ongoing, Progressing  Intervention: Maintain Seizure-Symptom Control  Description: Identify risks that may lower seizure threshold (e.g., hypoglycemia, illness, change in medications); assist in treatment.  Evaluate adherence to management plan (e.g., medication, symptom-control, trigger-avoidance, self-monitoring).  Advocate for continuation of home regimen, including medication, medication  administration schedule, diet, sleep schedule and symptom monitoring; acknowledge home management and routine.  Minimize potential seizure triggers, such as stress, sleep deprivation and diet modifications.  Evaluate effectiveness of coping skills; encourage expression of feelings, expectations and concerns related to disease management and quality of life; reinforce education to enhance management plan and wellbeing.  Maintain seizure precautions, such as removal of potential harmful objects, padded side rails, bed in low position, suction and airway protection equipment readily available.  Provide a quiet, calm environment.  If seizure occurs, stay at bedside; monitor and record details of seizure activity.  Maintain patent airway; do not insert anything into the mouth (unless the airway becomes compromised).  Place head of bed flat and turn to side-lying position to prevent aspiration and promote safety; do not restrain.  Anticipate administration of pharmacologic therapy, such as anticonvulsant.  Assess for injury following seizure; monitor, reassure and reorient patient.  Recent Flowsheet Documentation  Taken 9/25/2022 0000 by Irish Cordova RN  Seizure Precautions: activity supervised  Taken 9/24/2022 2200 by Irish Cordova RN  Seizure Precautions: activity supervised     Problem: Pain Acute  Goal: Acceptable Pain Control and Functional Ability  Outcome: Ongoing, Progressing  Intervention: Prevent or Manage Pain  Description: Evaluate pain level, effect of treatment and patient response at regular intervals.  Minimize painful stimuli; coordinate care and adjust environment (e.g., light, noise, unnecessary movement); promote sleep/rest.  Match pharmacologic analgesia to severity and type of pain mechanism (e.g., neuropathic, muscle, inflammatory); consider multimodal approach (e.g., nonopioid, opioid, adjuvant).  Provide medication at regular intervals; titrate to patient response; premedicate for painful  procedures.  Manage breakthrough pain with additional doses; consider rotation or switching medication.  Monitor for signs of substance tolerance (increased dose to reach desired effect, decreased effect with same dose).  Manage medication-induced effects, such as constipation, nausea, pruritus, urinary retention, somnolence and dizziness.  Provide multimodal interventions, such as as physical activity, therapeutic exercise, yoga, TENS (transcutaneous electrical nerve stimulation) and manual therapy.  Train in functional activity modifications, such as body mechanics, posture, ergonomics, energy conservation and activity pacing.  Consider addition of complementary or alternative therapy, such as acupuncture, hypnosis or therapeutic touch.  Recent Flowsheet Documentation  Taken 9/25/2022 0000 by Irish Cordova RN  Sensory Stimulation Regulation: television on  Sleep/Rest Enhancement:   room darkened   relaxation techniques promoted   regular sleep/rest pattern promoted   noise level reduced   natural light exposure provided   consistent schedule promoted   awakenings minimized  Medication Review/Management: medications reviewed  Taken 9/24/2022 2200 by Irish Cordova RN  Medication Review/Management: medications reviewed  Taken 9/24/2022 2100 by Irish Cordova RN  Sensory Stimulation Regulation: television on  Medication Review/Management: medications reviewed  Intervention: Develop Pain Management Plan  Description: Acknowledge patient as the expert in pain self-management.  Use a consistent, validated tool for pain assessment; include function and quality of life.  Evaluate risk for opioid use and dependence.  Set pain management goals; determine acceptable level of discomfort to allow for maximal functioning.  Determine [id number] pain management plan, including both pharmacologic and nonpharmacologic measures; integrate management of chronic (persistent) pain.  Identify and integrate past  successful treatment measures, if able.  Encourage patient and caregiver involvement in pain assessment, interventions and safety measures.  Re-evaluate plan regularly.  Recent Flowsheet Documentation  Taken 9/25/2022 0000 by Irish Cordova RN  Pain Management Interventions:   see MAR   quiet environment facilitated   care clustered  Taken 9/24/2022 2100 by Irish Cordova RN  Pain Management Interventions:   see MAR   quiet environment facilitated   care clustered  Intervention: Optimize Psychosocial Wellbeing  Description: Facilitate patient’s self-control over pain by providing pain information and allowing choices in treatment.  Consider and address emotional response to pain.  Explore and promote use of coping strategies; address barriers to successful coping.  Evaluate and assist with psychosocial, cultural and spiritual factors impacting pain.  Modify pain perception using techniques, such as distraction, mindfulness, guided imagery, meditation or music.  Assess for risk factors for developing chronic pain, such as depression, fear, pain avoidance and pain catastrophizing.  Consider referral for ongoing coping support, such as education, relaxation training and role of thoughts.  Recent Flowsheet Documentation  Taken 9/25/2022 0000 by Irish Cordova RN  Supportive Measures: active listening utilized  Diversional Activities: television  Taken 9/24/2022 2100 by Irish Cordova RN  Supportive Measures: active listening utilized  Diversional Activities: television   Goal Outcome Evaluation:  Plan of Care Reviewed With: patient        Progress: no change

## 2022-09-25 NOTE — DISCHARGE SUMMARY
[facility] Medicine Services  DISCHARGE SUMMARY    Patient Name: Jorge Luis Nuno  : 1975  MRN: [id number]      Discharge condition: Stabilized  Date of Admission: 2022  Discharge Diagnosis: Altered mental status auditory hallucinations  Date of Discharge:  22    Primary Care Physician: Tyrone Kendrick MD      Presenting Problem:   Hallucinations [R44.3]  MS (multiple sclerosis) (HCC) [G35]  Altered mental status, unspecified altered mental status type [R41.82]  Blepharoconjunctivitis of both eyes, unspecified blepharoconjunctivitis type [H10.503]    Active and Resolved Hospital Problems:  Active Hospital Problems    Diagnosis POA   • Hallucination [R44.3] Yes   • Multiple sclerosis (HCC) [G35] Yes   • General weakness [R53.1] Yes   • Altered mental status, unspecified altered mental status type [R41.82] Yes   • Renal insufficiency [N28.9] Yes   • Acute conjunctivitis of both eyes [H10.33] Yes   • Depression [F32.A] Yes   • Chronic pain [G89.29] Yes   • Migraine [G43.909] Yes   • Hypertension [I10] Yes   • Confusion [R41.0] Yes      Resolved Hospital Problems   No resolved problems to display.         Hospital Course     Hospital Course:  Jorge Luis Nuno is a 47 y.o. male who presented to the hospital after being brought in by his mother due to having visual and auditory hallucinations.  The patient has MS and chronically takes multiple mental status altering medications such as Flexeril baclofen MS Contin Lyrica.  The patient was taken off all the medicines and monitored closely.  The patient was noted to be tachycardic but stated this has been going on for months and he is quite asymptomatic.  The patient remained afebrile and was started on Toprol-XL to help with his both blood pressure and heart rate.  Patient was monitored off IV antibiotic therapy and once stable and cleared by neurology he was sent home in stable condition with stable vitals with instructions to  try to wean some of these medicines that he chronically takes off slowly with the help of his physician.  The patient agreed and was sent home without hallucinations or any other complaints at that time.  He was given a prescription for Toprol-XL          Reasons For Change In Medications and Indications for New Medications:      Day of Discharge     Vital Signs:  Temp:  [98.9 °F (37.2 °C)-99.3 °F (37.4 °C)] 99.1 °F (37.3 °C)  Heart Rate:  [111-122] 111  Resp:  [18-23] 18  BP: (125-173)/() 173/104  Flow (L/min):  [2] 2    Physical Exam:  Physical Exam  Vitals reviewed.   Constitutional:       Comments: Nurse at bedside   HENT:      Head: Normocephalic.   Cardiovascular:      Rate and Rhythm: Tachycardia present.   Pulmonary:      Effort: Pulmonary effort is normal.   Abdominal:      General: Abdomen is flat.      Palpations: Abdomen is soft.   Musculoskeletal:         General: Normal range of motion.      Cervical back: Normal range of motion.   Skin:     General: Skin is warm.      Findings: Bruising: .me.   Neurological:      General: No focal deficit present.      Mental Status: He is alert and oriented to person, place, and time.      Comments: Tremulous   Psychiatric:         Mood and Affect: Mood normal.         Behavior: Behavior normal.            Pertinent  and/or Most Recent Results     LAB RESULTS:      Lab 09/24/22  2343 09/23/22  2317 09/23/22  0131 09/22/22  1858 09/22/22  1855   WBC 10.50 9.90 8.20  --  10.20   HEMOGLOBIN 13.2 12.8* 12.5*  --  13.7   HEMATOCRIT 39.4 39.1 38.4  --  41.3   PLATELETS 218 218 206  --  242   NEUTROS ABS 8.30* 8.40* 5.20  --  7.40*   LYMPHS ABS 1.30 0.80 1.80  --  1.50   MONOS ABS 0.70 0.50 0.80  --  0.90   EOS ABS 0.10 0.10 0.30  --  0.40   MCV 83.5 85.9 86.0  --  84.6   SED RATE 27*  --   --   --   --    CRP 1.31*  --   --   --   --    PROCALCITONIN 0.03  --   --   --   --    LACTATE  --   --   --  1.4  --          Lab 09/24/22  2343 09/24/22  7053 09/23/22  5198  09/23/22  0131 09/22/22  1855   SODIUM 138  --  139 136 137   POTASSIUM 3.4* 3.9 4.1 3.6 3.9   CHLORIDE 101  --  103 101 99   CO2 25.0  --  25.0 26.0 28.0   ANION GAP 12.0  --  11.0 9.0 10.0   BUN 7  --  10 14 10   CREATININE 0.87  --  0.87 1.24 1.37*   EGFR 108.4  --  108.4 73.1 64.8   GLUCOSE 97  --  105* 103* 101*   CALCIUM 8.5*  --  8.7 8.0* 9.0   MAGNESIUM 2.0 1.9 1.9  --   --    PHOSPHORUS 2.4*  --  1.9*  --   --          Lab 09/24/22  2343 09/23/22  2317 09/22/22  1855   TOTAL PROTEIN 6.3 6.1 6.5   ALBUMIN 3.60 3.50 3.70   GLOBULIN 2.7 2.6 2.8   ALT (SGPT) 9 10 15   AST (SGOT) 14 16 13   BILIRUBIN 0.5 0.5 0.6   ALK PHOS 82 90 91                     Lab 09/22/22  2227   PH, ARTERIAL 7.395   PCO2, ARTERIAL 45.6   PO2 ART 83.1   O2 SATURATION ART 96.0   FIO2 21   HCO3 ART 27.9   BASE EXCESS ART 2.4     Brief Urine Lab Results  (Last result in the past 365 days)      Color   Clarity   Blood   Leuk Est   Nitrite   Protein   CREAT   Urine HCG        09/22/22 2124 Yellow   Clear   Negative   Negative   Negative   Negative               Microbiology Results (last 10 days)     Procedure Component Value - Date/Time    Blood Culture - Blood, Hand, Left [[id number]]  (Normal) Collected: 09/23/22 1226    Lab Status: Preliminary result Specimen: Blood from Hand, Left Updated: 09/25/22 1245     Blood Culture No growth at 2 days    MRSA Screen, PCR (Inpatient) - Swab, Nares [[id number]]  (Normal) Collected: 09/23/22 0259    Lab Status: Final result Specimen: Swab from Nares Updated: 09/23/22 0438     MRSA PCR No MRSA Detected    Narrative:      The negative predictive value of this diagnostic test is high and should only be used to consider de-escalating anti-MRSA therapy. A positive result may indicate colonization with MRSA and must be correlated clinically.    Blood Culture - Blood, Arm, Left [[id number]]  (Normal) Collected: 09/22/22 1929    Lab Status: Preliminary result Specimen: Blood from Arm, Left Updated: 09/24/22  1946     Blood Culture No growth at 2 days    Blood Culture - Blood, Arm, Right [[id number]]  (Abnormal) Collected: 09/22/22 1855    Lab Status: Final result Specimen: Blood from Arm, Right Updated: 09/25/22 0650     Blood Culture Bacillus species     Isolated from Anaerobic Bottle     Gram Stain Anaerobic Bottle Gram positive bacilli, large    Narrative:      Probable contaminant requires clinical correlation, susceptibility not performed unless requested by physician.    Blood culture does not meet the specified criteria for PCR identification.  If pregnant, immunocompromised, or clinical concern for meningitis, call lab to run BCID for Listeria monocytogenes.          CT Head Without Contrast    Result Date: 9/22/2022  Impression: No acute intracranial abnormality.  Electronically Signed ByAmber Carrillo On:9/22/2022 8:58 PM This report was finalized on [id number] by  Prosper Carrillo, .    XR Chest 1 View    Result Date: 9/22/2022  Impression: IMPRESSION : Low lung volume exam with mild pulmonary vascular congestion and probable dependent atelectasis[  Electronically Signed ByAmber Carrillo On:9/22/2022 11:22 PM This report was finalized on [id number] by  Prosper Carrillo, .                  Labs Pending at Discharge:  Pending Labs     Order Current Status    Respiratory Panel PCR w/COVID-19(SARS-CoV-2) [facility]LOS/CHRISTY/[facility]L In-House, NP Swab in UTM/VTM, 3-4 HR TAT - Swab, Nasopharynx Collected (09/25/22 1035)    Blood Culture - Blood, Arm, Left Preliminary result    Blood Culture - Blood, Hand, Left Preliminary result          Procedures Performed           Consults:   Consults     Date and Time Order Name Status Description    9/23/2022 11:37 AM Inpatient Infectious Diseases Consult Completed     9/23/2022 12:23 AM Inpatient Neurology Consult General Completed     9/22/2022 10:12 PM Hospitalist (on-call MD unless specified)              Discharge Details        Discharge Medications      New  Medications      Instructions Start Date   metoprolol succinate XL 50 MG 24 hr tablet  Commonly known as: Toprol XL   50 mg, Oral, Daily         Continue These Medications      Instructions Start Date   amLODIPine 10 MG tablet  Commonly known as: NORVASC   10 mg, Oral, Daily      baclofen 20 MG tablet  Commonly known as: LIORESAL   20 mg, Oral, Every 6 Hours PRN      bethanechol 25 MG tablet  Commonly known as: URECHOLINE   25 mg, Oral, 3 Times Daily      cyclobenzaprine 10 MG tablet  Commonly known as: FLEXERIL   10 mg, Oral, 3 Times Daily      lisinopril 10 MG tablet  Commonly known as: PRINIVIL,ZESTRIL   10 mg, Oral, Daily      meclizine 12.5 MG tablet  Commonly known as: ANTIVERT   12.5 mg, Oral, 3 Times Daily PRN      meloxicam 15 MG tablet  Commonly known as: MOBIC   15 mg, Oral, Daily      Methylnaltrexone Bromide 150 MG tablet  Commonly known as: Relistor   450 mg, Oral, Daily      Morphine 15 MG 12 hr tablet  Commonly known as: MS CONTIN   15 mg, Oral, 3 Times Daily PRN      Morphine 15 MG tablet  Commonly known as: MSIR   15 mg, Oral, 3 Times Daily PRN      Morphine 15 MG 12 hr tablet  Commonly known as: MS CONTIN   15 mg, Oral, 3 Times Daily      Morphine 15 MG tablet  Commonly known as: MSIR   15 mg, Oral, 3 Times Daily PRN      pregabalin 300 MG capsule  Commonly known as: LYRICA   300 mg, Oral, 2 Times Daily      rizatriptan 10 MG tablet  Commonly known as: MAXALT   10 mg, Oral, Once As Needed, May repeat in 2 hours if needed              No Known Allergies      Discharge Disposition:   Home or Self Care    Diet:  Hospital:  Diet Order   Procedures   • Diet Regular         Discharge Activity:         CODE STATUS:  Code Status and Medical Interventions:   Ordered at: 09/25/22 0830     Medical Intervention Limits:    NO intubation (DNI)     Code Status (Patient has no pulse and is not breathing):    No CPR (Do Not Attempt to Resuscitate)     Medical Interventions (Patient has pulse or is breathing):     Limited Support     Release to patient:    Routine Release         Future Appointments   Date Time Provider Department Center   10/6/2022  2:15 PM Yg Elliott MD [facility]           Time spent on Discharge including face to face service:  45 minutes    This patient has been examined wearing appropriate Personal Protective Equipment and discussed with Patient. 09/25/22      Signature: Manan Hernandez MD

## 2022-09-25 NOTE — PROGRESS NOTES
Pt remains tachycardic and with low grade temp despite reintroduction of muscle relaxants/opiates. Will resume ceftriaxone. Check viral panel.

## 2022-09-25 NOTE — PROGRESS NOTES
Patient states has been tachycardic for the last several months at home.  He remains asymptomatic.  We will start Toprol XL for blood pressure and heart rate control.

## 2022-09-25 NOTE — PLAN OF CARE
Problem: Adult Inpatient Plan of Care  Goal: Absence of Hospital-Acquired Illness or Injury  Intervention: Identify and Manage Fall Risk  Recent Flowsheet Documentation  Taken 9/25/2022 1110 by Yamini Matthews RN  Safety Promotion/Fall Prevention:   activity supervised   assistive device/personal items within reach   clutter free environment maintained   nonskid shoes/slippers when out of bed   safety round/check completed  Taken 9/25/2022 1000 by Yamini Matthews RN  Safety Promotion/Fall Prevention:   activity supervised   assistive device/personal items within reach   clutter free environment maintained   nonskid shoes/slippers when out of bed   safety round/check completed  Taken 9/25/2022 0835 by Yamini Matthews RN  Safety Promotion/Fall Prevention:   activity supervised   assistive device/personal items within reach   clutter free environment maintained   nonskid shoes/slippers when out of bed   safety round/check completed  Intervention: Prevent Skin Injury  Recent Flowsheet Documentation  Taken 9/25/2022 0835 by Yamini Matthews RN  Skin Protection: adhesive use limited  Intervention: Prevent and Manage VTE (Venous Thromboembolism) Risk  Recent Flowsheet Documentation  Taken 9/25/2022 0835 by Yamini Matthews RN  VTE Prevention/Management: patient refused intervention  Intervention: Prevent Infection  Recent Flowsheet Documentation  Taken 9/25/2022 1110 by Yamini Matthews RN  Infection Prevention: environmental surveillance performed  Taken 9/25/2022 1000 by Yamini Matthews RN  Infection Prevention: environmental surveillance performed  Taken 9/25/2022 0835 by Yamini Matthews RN  Infection Prevention: environmental surveillance performed  Goal: Optimal Comfort and Wellbeing  Intervention: Provide Person-Centered Care  Recent Flowsheet Documentation  Taken 9/25/2022 0835 by Yamini Matthews RN  Trust Relationship/Rapport: care explained   Goal Outcome Evaluation:

## 2022-09-27 ENCOUNTER — TELEPHONE (OUTPATIENT)
Dept: PAIN MEDICINE | Facility: CLINIC | Age: 47
End: 2022-09-27

## 2022-09-27 LAB
BACTERIA SPEC AEROBE CULT: NORMAL
QT INTERVAL: 320 MS

## 2022-09-28 LAB — BACTERIA SPEC AEROBE CULT: NORMAL

## 2023-01-02 ENCOUNTER — HOSPITAL ENCOUNTER (OUTPATIENT)
Facility: HOSPITAL | Age: 48
Setting detail: OBSERVATION
Discharge: HOME | End: 2023-01-04
Attending: EMERGENCY MEDICINE | Admitting: FAMILY MEDICINE
Payer: MEDICARE

## 2023-01-02 DIAGNOSIS — R42 ORTHOSTATIC LIGHTHEADEDNESS: ICD-10-CM

## 2023-01-02 DIAGNOSIS — R42 DIZZINESS: Primary | ICD-10-CM

## 2023-01-02 DIAGNOSIS — R20.2 PARESTHESIAS: ICD-10-CM

## 2023-01-02 LAB
AMPHET+METHAMPHET UR QL: NEGATIVE
ANION GAP SERPL CALCULATED.3IONS-SCNC: 14 MMOL/L (ref 5–15)
BARBITURATES UR QL SCN: NEGATIVE
BASOPHILS # BLD AUTO: 0.1 10*3/MM3 (ref 0–0.2)
BASOPHILS NFR BLD AUTO: 0.4 % (ref 0–1.5)
BENZODIAZ UR QL SCN: NEGATIVE
BUN SERPL-MCNC: 13 MG/DL (ref 6–20)
BUN/CREAT SERPL: 13.1 (ref 7–25)
CALCIUM SPEC-SCNC: 9.2 MG/DL (ref 8.6–10.5)
CANNABINOIDS SERPL QL: NEGATIVE
CHLORIDE SERPL-SCNC: 100 MMOL/L (ref 98–107)
CO2 SERPL-SCNC: 24 MMOL/L (ref 22–29)
COCAINE UR QL: NEGATIVE
CREAT SERPL-MCNC: 0.99 MG/DL (ref 0.76–1.27)
DEPRECATED RDW RBC AUTO: 46.8 FL (ref 37–54)
EGFRCR SERPLBLD CKD-EPI 2021: 95.7 ML/MIN/1.73
EOSINOPHIL # BLD AUTO: 0.1 10*3/MM3 (ref 0–0.4)
EOSINOPHIL NFR BLD AUTO: 0.7 % (ref 0.3–6.2)
ERYTHROCYTE [DISTWIDTH] IN BLOOD BY AUTOMATED COUNT: 15 % (ref 12.3–15.4)
GLUCOSE BLDC GLUCOMTR-MCNC: 109 MG/DL (ref 70–105)
GLUCOSE SERPL-MCNC: 171 MG/DL (ref 65–99)
HBA1C MFR BLD: 5 % (ref 3.5–5.6)
HCT VFR BLD AUTO: 46.3 % (ref 37.5–51)
HGB BLD-MCNC: 15.2 G/DL (ref 13–17.7)
LYMPHOCYTES # BLD AUTO: 1.5 10*3/MM3 (ref 0.7–3.1)
LYMPHOCYTES NFR BLD AUTO: 11 % (ref 19.6–45.3)
MAGNESIUM SERPL-MCNC: 2 MG/DL (ref 1.6–2.6)
MCH RBC QN AUTO: 29.7 PG (ref 26.6–33)
MCHC RBC AUTO-ENTMCNC: 32.7 G/DL (ref 31.5–35.7)
MCV RBC AUTO: 90.6 FL (ref 79–97)
METHADONE UR QL SCN: NEGATIVE
MONOCYTES # BLD AUTO: 0.6 10*3/MM3 (ref 0.1–0.9)
MONOCYTES NFR BLD AUTO: 4.6 % (ref 5–12)
NEUTROPHILS NFR BLD AUTO: 11.1 10*3/MM3 (ref 1.7–7)
NEUTROPHILS NFR BLD AUTO: 83.3 % (ref 42.7–76)
NRBC BLD AUTO-RTO: 0 /100 WBC (ref 0–0.2)
OPIATES UR QL: POSITIVE
OXYCODONE UR QL SCN: NEGATIVE
PLATELET # BLD AUTO: 274 10*3/MM3 (ref 140–450)
PMV BLD AUTO: 8.3 FL (ref 6–12)
POTASSIUM SERPL-SCNC: 3.6 MMOL/L (ref 3.5–5.2)
RBC # BLD AUTO: 5.11 10*6/MM3 (ref 4.14–5.8)
SODIUM SERPL-SCNC: 138 MMOL/L (ref 136–145)
TROPONIN T SERPL-MCNC: <0.01 NG/ML (ref 0–0.03)
WBC NRBC COR # BLD: 13.3 10*3/MM3 (ref 3.4–10.8)

## 2023-01-02 PROCEDURE — G0378 HOSPITAL OBSERVATION PER HR: HCPCS

## 2023-01-02 PROCEDURE — 80048 BASIC METABOLIC PNL TOTAL CA: CPT | Performed by: EMERGENCY MEDICINE

## 2023-01-02 PROCEDURE — 96372 THER/PROPH/DIAG INJ SC/IM: CPT

## 2023-01-02 PROCEDURE — 80307 DRUG TEST PRSMV CHEM ANLYZR: CPT | Performed by: EMERGENCY MEDICINE

## 2023-01-02 PROCEDURE — 93005 ELECTROCARDIOGRAM TRACING: CPT | Performed by: EMERGENCY MEDICINE

## 2023-01-02 PROCEDURE — 85025 COMPLETE CBC W/AUTO DIFF WBC: CPT | Performed by: EMERGENCY MEDICINE

## 2023-01-02 PROCEDURE — 83735 ASSAY OF MAGNESIUM: CPT | Performed by: EMERGENCY MEDICINE

## 2023-01-02 PROCEDURE — 82962 GLUCOSE BLOOD TEST: CPT

## 2023-01-02 PROCEDURE — 25010000002 ENOXAPARIN PER 10 MG: Performed by: PHYSICIAN ASSISTANT

## 2023-01-02 PROCEDURE — 84484 ASSAY OF TROPONIN QUANT: CPT | Performed by: EMERGENCY MEDICINE

## 2023-01-02 PROCEDURE — 83036 HEMOGLOBIN GLYCOSYLATED A1C: CPT | Performed by: PHYSICIAN ASSISTANT

## 2023-01-02 PROCEDURE — 99284 EMERGENCY DEPT VISIT MOD MDM: CPT

## 2023-01-02 RX ORDER — SODIUM CHLORIDE 0.9 % (FLUSH) 0.9 %
10 SYRINGE (ML) INJECTION AS NEEDED
Status: DISCONTINUED | OUTPATIENT
Start: 2023-01-02 | End: 2023-01-04 | Stop reason: HOSPADM

## 2023-01-02 RX ORDER — NITROGLYCERIN 0.4 MG/1
0.4 TABLET SUBLINGUAL
Status: DISCONTINUED | OUTPATIENT
Start: 2023-01-02 | End: 2023-01-04 | Stop reason: HOSPADM

## 2023-01-02 RX ORDER — ACETAMINOPHEN 160 MG/5ML
650 SOLUTION ORAL EVERY 4 HOURS PRN
Status: DISCONTINUED | OUTPATIENT
Start: 2023-01-02 | End: 2023-01-04 | Stop reason: HOSPADM

## 2023-01-02 RX ORDER — SUMATRIPTAN 50 MG/1
50 TABLET, FILM COATED ORAL ONCE AS NEEDED
Status: COMPLETED | OUTPATIENT
Start: 2023-01-02 | End: 2023-01-02

## 2023-01-02 RX ORDER — MAGNESIUM SULFATE HEPTAHYDRATE 40 MG/ML
2 INJECTION, SOLUTION INTRAVENOUS AS NEEDED
Status: DISCONTINUED | OUTPATIENT
Start: 2023-01-02 | End: 2023-01-04 | Stop reason: HOSPADM

## 2023-01-02 RX ORDER — OLANZAPINE 10 MG/2ML
1 INJECTION, POWDER, LYOPHILIZED, FOR SOLUTION INTRAMUSCULAR
Status: DISCONTINUED | OUTPATIENT
Start: 2023-01-02 | End: 2023-01-04 | Stop reason: HOSPADM

## 2023-01-02 RX ORDER — POLYETHYLENE GLYCOL 3350 17 G/17G
17 POWDER, FOR SOLUTION ORAL DAILY PRN
Status: DISCONTINUED | OUTPATIENT
Start: 2023-01-02 | End: 2023-01-04 | Stop reason: HOSPADM

## 2023-01-02 RX ORDER — NICOTINE POLACRILEX 4 MG
15 LOZENGE BUCCAL
Status: DISCONTINUED | OUTPATIENT
Start: 2023-01-02 | End: 2023-01-04 | Stop reason: HOSPADM

## 2023-01-02 RX ORDER — HYDROCODONE BITARTRATE AND ACETAMINOPHEN 10; 325 MG/1; MG/1
1 TABLET ORAL EVERY 6 HOURS PRN
Status: DISCONTINUED | OUTPATIENT
Start: 2023-01-02 | End: 2023-01-04 | Stop reason: HOSPADM

## 2023-01-02 RX ORDER — SODIUM CHLORIDE 0.9 % (FLUSH) 0.9 %
10 SYRINGE (ML) INJECTION EVERY 12 HOURS SCHEDULED
Status: DISCONTINUED | OUTPATIENT
Start: 2023-01-02 | End: 2023-01-04 | Stop reason: HOSPADM

## 2023-01-02 RX ORDER — ACETAMINOPHEN 325 MG/1
650 TABLET ORAL EVERY 4 HOURS PRN
Status: DISCONTINUED | OUTPATIENT
Start: 2023-01-02 | End: 2023-01-04 | Stop reason: HOSPADM

## 2023-01-02 RX ORDER — ALUMINA, MAGNESIA, AND SIMETHICONE 2400; 2400; 240 MG/30ML; MG/30ML; MG/30ML
15 SUSPENSION ORAL EVERY 6 HOURS PRN
Status: DISCONTINUED | OUTPATIENT
Start: 2023-01-02 | End: 2023-01-04 | Stop reason: HOSPADM

## 2023-01-02 RX ORDER — AMLODIPINE BESYLATE 5 MG/1
10 TABLET ORAL DAILY
Status: DISCONTINUED | OUTPATIENT
Start: 2023-01-03 | End: 2023-01-04 | Stop reason: HOSPADM

## 2023-01-02 RX ORDER — HYDROCODONE BITARTRATE AND ACETAMINOPHEN 10; 325 MG/1; MG/1
1 TABLET ORAL 4 TIMES DAILY PRN
COMMUNITY

## 2023-01-02 RX ORDER — MECLIZINE HYDROCHLORIDE 25 MG/1
12.5 TABLET ORAL 3 TIMES DAILY PRN
Status: DISCONTINUED | OUTPATIENT
Start: 2023-01-02 | End: 2023-01-04 | Stop reason: HOSPADM

## 2023-01-02 RX ORDER — ACETAMINOPHEN 650 MG/1
650 SUPPOSITORY RECTAL EVERY 4 HOURS PRN
Status: DISCONTINUED | OUTPATIENT
Start: 2023-01-02 | End: 2023-01-04 | Stop reason: HOSPADM

## 2023-01-02 RX ORDER — INSULIN LISPRO 100 [IU]/ML
2-7 INJECTION, SOLUTION INTRAVENOUS; SUBCUTANEOUS
Status: DISCONTINUED | OUTPATIENT
Start: 2023-01-02 | End: 2023-01-04 | Stop reason: HOSPADM

## 2023-01-02 RX ORDER — AMOXICILLIN 250 MG
2 CAPSULE ORAL 2 TIMES DAILY
Status: DISCONTINUED | OUTPATIENT
Start: 2023-01-02 | End: 2023-01-04 | Stop reason: HOSPADM

## 2023-01-02 RX ORDER — ENOXAPARIN SODIUM 100 MG/ML
40 INJECTION SUBCUTANEOUS
Status: DISCONTINUED | OUTPATIENT
Start: 2023-01-02 | End: 2023-01-04 | Stop reason: HOSPADM

## 2023-01-02 RX ORDER — CHOLECALCIFEROL (VITAMIN D3) 125 MCG
5 CAPSULE ORAL NIGHTLY PRN
Status: DISCONTINUED | OUTPATIENT
Start: 2023-01-02 | End: 2023-01-04 | Stop reason: HOSPADM

## 2023-01-02 RX ORDER — BACLOFEN 10 MG/1
20 TABLET ORAL EVERY 6 HOURS SCHEDULED
Status: DISCONTINUED | OUTPATIENT
Start: 2023-01-02 | End: 2023-01-04 | Stop reason: HOSPADM

## 2023-01-02 RX ORDER — DEXTROSE MONOHYDRATE 25 G/50ML
25 INJECTION, SOLUTION INTRAVENOUS
Status: DISCONTINUED | OUTPATIENT
Start: 2023-01-02 | End: 2023-01-04 | Stop reason: HOSPADM

## 2023-01-02 RX ORDER — ONDANSETRON 2 MG/ML
4 INJECTION INTRAMUSCULAR; INTRAVENOUS EVERY 6 HOURS PRN
Status: DISCONTINUED | OUTPATIENT
Start: 2023-01-02 | End: 2023-01-04 | Stop reason: HOSPADM

## 2023-01-02 RX ORDER — LISINOPRIL 5 MG/1
10 TABLET ORAL DAILY
Status: DISCONTINUED | OUTPATIENT
Start: 2023-01-03 | End: 2023-01-04 | Stop reason: HOSPADM

## 2023-01-02 RX ORDER — BISACODYL 10 MG
10 SUPPOSITORY, RECTAL RECTAL DAILY PRN
Status: DISCONTINUED | OUTPATIENT
Start: 2023-01-02 | End: 2023-01-04 | Stop reason: HOSPADM

## 2023-01-02 RX ORDER — POTASSIUM CHLORIDE 20 MEQ/1
40 TABLET, EXTENDED RELEASE ORAL AS NEEDED
Status: DISCONTINUED | OUTPATIENT
Start: 2023-01-02 | End: 2023-01-04 | Stop reason: HOSPADM

## 2023-01-02 RX ORDER — BISACODYL 5 MG/1
5 TABLET, DELAYED RELEASE ORAL DAILY PRN
Status: DISCONTINUED | OUTPATIENT
Start: 2023-01-02 | End: 2023-01-04 | Stop reason: HOSPADM

## 2023-01-02 RX ORDER — POTASSIUM CHLORIDE 1.5 G/1.77G
40 POWDER, FOR SOLUTION ORAL AS NEEDED
Status: DISCONTINUED | OUTPATIENT
Start: 2023-01-02 | End: 2023-01-04 | Stop reason: HOSPADM

## 2023-01-02 RX ORDER — MAGNESIUM SULFATE 1 G/100ML
1 INJECTION INTRAVENOUS AS NEEDED
Status: DISCONTINUED | OUTPATIENT
Start: 2023-01-02 | End: 2023-01-04 | Stop reason: HOSPADM

## 2023-01-02 RX ORDER — PREGABALIN 100 MG/1
300 CAPSULE ORAL EVERY 12 HOURS SCHEDULED
Status: DISCONTINUED | OUTPATIENT
Start: 2023-01-02 | End: 2023-01-04 | Stop reason: HOSPADM

## 2023-01-02 RX ORDER — SODIUM CHLORIDE 9 MG/ML
40 INJECTION, SOLUTION INTRAVENOUS AS NEEDED
Status: DISCONTINUED | OUTPATIENT
Start: 2023-01-02 | End: 2023-01-04 | Stop reason: HOSPADM

## 2023-01-02 RX ORDER — ONDANSETRON 4 MG/1
4 TABLET, FILM COATED ORAL EVERY 6 HOURS PRN
Status: DISCONTINUED | OUTPATIENT
Start: 2023-01-02 | End: 2023-01-04 | Stop reason: HOSPADM

## 2023-01-02 RX ADMIN — ENOXAPARIN SODIUM 40 MG: 100 INJECTION SUBCUTANEOUS at 18:00

## 2023-01-02 RX ADMIN — Medication 10 ML: at 20:08

## 2023-01-02 RX ADMIN — SODIUM CHLORIDE 1000 ML: 9 INJECTION, SOLUTION INTRAVENOUS at 14:37

## 2023-01-02 RX ADMIN — SUMATRIPTAN SUCCINATE 50 MG: 50 TABLET ORAL at 23:34

## 2023-01-02 RX ADMIN — PREGABALIN 300 MG: 100 CAPSULE ORAL at 20:07

## 2023-01-02 RX ADMIN — BACLOFEN 20 MG: 10 TABLET ORAL at 23:34

## 2023-01-02 RX ADMIN — BACLOFEN 20 MG: 10 TABLET ORAL at 18:05

## 2023-01-02 RX ADMIN — Medication 5 MG: at 23:34

## 2023-01-02 RX ADMIN — HYDROCODONE BITARTRATE AND ACETAMINOPHEN 1 TABLET: 10; 325 TABLET ORAL at 18:14

## 2023-01-02 NOTE — PLAN OF CARE
Problem: Adult Inpatient Plan of Care  Goal: Plan of Care Review  Outcome: Ongoing, Progressing  Goal: Patient-Specific Goal (Individualized)  Outcome: Ongoing, Progressing  Goal: Absence of Hospital-Acquired Illness or Injury  Outcome: Ongoing, Progressing  Intervention: Identify and Manage Fall Risk  Recent Flowsheet Documentation  Taken 1/2/2023 1800 by Yamini Martins LPN  Safety Promotion/Fall Prevention: safety round/check completed  Intervention: Prevent and Manage VTE (Venous Thromboembolism) Risk  Recent Flowsheet Documentation  Taken 1/2/2023 1800 by Yamini Martins LPN  Activity Management: up ad nathan  Intervention: Prevent Infection  Recent Flowsheet Documentation  Taken 1/2/2023 1800 by Yamini Martins LPN  Infection Prevention:   single patient room provided   hand hygiene promoted  Goal: Optimal Comfort and Wellbeing  Outcome: Ongoing, Progressing  Goal: Readiness for Transition of Care  Outcome: Ongoing, Progressing     Problem: Hypertension Comorbidity  Goal: Blood Pressure in Desired Range  Outcome: Ongoing, Progressing  Intervention: Maintain Blood Pressure Management  Recent Flowsheet Documentation  Taken 1/2/2023 1800 by Yamini Martins LPN  Medication Review/Management: medications reviewed   Goal Outcome Evaluation:

## 2023-01-02 NOTE — ED PROVIDER NOTES
Subjective   History of Present Illness  Chief complaint: Dizziness    47-year-old male with a history of multiple sclerosis presents with dizziness.  Patient states symptoms have been present over the past 3 days.  He states he has a burning sensation on his skin that is worse in his head, neck, upper chest.  This is bilateral.  He denies any focal numbness, weakness, tingling.  He states he feels weak all over.  He has had no fever or recent illness.  He denies any alleviating or exacerbating factors.    History provided by:  Patient      Review of Systems   Constitutional: Negative for fever.   HENT: Negative for congestion.    Respiratory: Negative for cough and shortness of breath.    Cardiovascular: Negative for chest pain.   Gastrointestinal: Negative for abdominal pain, diarrhea and vomiting.   Musculoskeletal: Negative for back pain.   Neurological: Positive for dizziness and weakness. Negative for headaches.   Psychiatric/Behavioral: Negative for confusion.       Past Medical History:   Diagnosis Date   • Foot pain    • Hypertension    • Migraine    • MS (multiple sclerosis) ([facility]) 2015       No Known Allergies    Past Surgical History:   Procedure Laterality Date   • FOOT FRACTURE SURGERY Left 2002   • FOOT FRACTURE SURGERY Left    • LEG SURGERY Right     Fibula plates and screws       No family history on file.    Social History     Socioeconomic History   • Marital status:    Tobacco Use   • Smoking status: Never   • Smokeless tobacco: Never   Vaping Use   • Vaping Use: Never used   Substance and Sexual Activity   • Alcohol use: Never   • Drug use: Never   • Sexual activity: Defer       /90   Pulse 104   Temp 98 °F (36.7 °C) (Oral)   Resp 16   Ht 175.3 cm (69\")   Wt 120 kg (264 lb 8.8 oz)   SpO2 98%   BMI 39.07 kg/m²       Objective   Physical Exam  Vitals and nursing note reviewed.   Constitutional:       Appearance: He is well-developed.   HENT:      Head: Normocephalic and  atraumatic.   Eyes:      Pupils: Pupils are equal, round, and reactive to light.   Cardiovascular:      Rate and Rhythm: Regular rhythm. Tachycardia present.      Heart sounds: Normal heart sounds.   Pulmonary:      Effort: Pulmonary effort is normal. No respiratory distress.      Breath sounds: Normal breath sounds.   Abdominal:      General: Bowel sounds are normal.      Palpations: Abdomen is soft.      Tenderness: There is no abdominal tenderness.   Skin:     General: Skin is warm and dry.   Neurological:      General: No focal deficit present.      Mental Status: He is alert and oriented to person, place, and time.         Procedures           ED Course      Results for orders placed or performed during the hospital encounter of 01/02/23   Troponin    Specimen: Blood   Result Value Ref Range    Troponin T <0.010 0.000 - 0.030 ng/mL   Urine Drug Screen - Urine, Clean Catch    Specimen: Urine, Clean Catch   Result Value Ref Range    Amphet/Methamphet, Screen Negative Negative    Barbiturates Screen, Urine Negative Negative    Benzodiazepine Screen, Urine Negative Negative    Cocaine Screen, Urine Negative Negative    Opiate Screen Positive (A) Negative    THC, Screen, Urine Negative Negative    Methadone Screen, Urine Negative Negative    Oxycodone Screen, Urine Negative Negative   Basic Metabolic Panel    Specimen: Blood   Result Value Ref Range    Glucose 171 (H) 65 - 99 mg/dL    BUN 13 6 - 20 mg/dL    Creatinine 0.99 0.76 - 1.27 mg/dL    Sodium 138 136 - 145 mmol/L    Potassium 3.6 3.5 - 5.2 mmol/L    Chloride 100 98 - 107 mmol/L    CO2 24.0 22.0 - 29.0 mmol/L    Calcium 9.2 8.6 - 10.5 mg/dL    BUN/Creatinine Ratio 13.1 7.0 - 25.0    Anion Gap 14.0 5.0 - 15.0 mmol/L    eGFR 95.7 >60.0 mL/min/1.73   Magnesium    Specimen: Blood   Result Value Ref Range    Magnesium 2.0 1.6 - 2.6 mg/dL   CBC Auto Differential    Specimen: Blood   Result Value Ref Range    WBC 13.30 (H) 3.40 - 10.80 10*3/mm3    RBC 5.11 4.14 -  5.80 10*6/mm3    Hemoglobin 15.2 13.0 - 17.7 g/dL    Hematocrit 46.3 37.5 - 51.0 %    MCV 90.6 79.0 - 97.0 fL    MCH 29.7 26.6 - 33.0 pg    MCHC 32.7 31.5 - 35.7 g/dL    RDW 15.0 12.3 - 15.4 %    RDW-SD 46.8 37.0 - 54.0 fl    MPV 8.3 6.0 - 12.0 fL    Platelets 274 140 - 450 10*3/mm3    Neutrophil % 83.3 (H) 42.7 - 76.0 %    Lymphocyte % 11.0 (L) 19.6 - 45.3 %    Monocyte % 4.6 (L) 5.0 - 12.0 %    Eosinophil % 0.7 0.3 - 6.2 %    Basophil % 0.4 0.0 - 1.5 %    Neutrophils, Absolute 11.10 (H) 1.70 - 7.00 10*3/mm3    Lymphocytes, Absolute 1.50 0.70 - 3.10 10*3/mm3    Monocytes, Absolute 0.60 0.10 - 0.90 10*3/mm3    Eosinophils, Absolute 0.10 0.00 - 0.40 10*3/mm3    Basophils, Absolute 0.10 0.00 - 0.20 10*3/mm3    nRBC 0.0 0.0 - 0.2 /100 WBC   ECG 12 Lead Other; dizziness   Result Value Ref Range    QT Interval 311 ms               My interpretation of EKG shows sinus tachycardia, rate of 115, no ST elevation                           Medical Decision Making  Dizziness: acute illness or injury  Orthostatic lightheadedness: acute illness or injury  Paresthesias: acute illness or injury  Amount and/or Complexity of Data Reviewed  Labs: ordered. Decision-making details documented in ED Course.  ECG/medicine tests: ordered and independent interpretation performed.      Risk  Decision regarding hospitalization.         Patient had the above evaluation.  Results were discussed with the patient.  White blood cell count was borderline elevated at 13.3.  BMP is unremarkable.  Magnesium is normal.  Urine drug screen is positive for opiates however patient is on pain management.  Troponin is negative.  EKG shows no acute ischemia.  Orthostatic vital signs were positive.  His systolic blood pressure dropped from 148 while laying to 116 standing.  This is likely contributing to his dizziness.  Patient was started on IV fluids.  I discussed with the nurse practitioner on-call for the hospitalist and the patient will be admitted for  further evaluation and management.      Final diagnoses:   Dizziness   Orthostatic lightheadedness   Paresthesias       ED Disposition  ED Disposition     ED Disposition   Decision to Admit    Condition   --    Comment   Level of Care: Telemetry [5]   Admitting Physician: PARVIN HAMILTON [[id number]]               No follow-up provider specified.       Medication List      No changes were made to your prescriptions during this visit.          Giovanni Keene MD  01/02/23 [id number]

## 2023-01-02 NOTE — PLAN OF CARE
Goal Outcome Evaluation:  Plan of Care Reviewed With: patient        Progress: no change  Outcome Evaluation: pt. admitted with c/o dizziness. Reports tingling to lt side of body & face.

## 2023-01-02 NOTE — H&P
[facility] Medicine Services      Patient Name: Jorge Luis Nuno  : 1975  MRN: [id number]  Primary Care Physician:  Tyrone Kendrick MD  Date of admission: 2023      Subjective      Chief Complaint: Dizziness    History of Present Illness: Jorge Luis Nuno is a 47 y.o. male with a past medical history to include hypertension and multiple sclerosis who presented to [facility] on 2023 complaining of dizziness.  Patient reports 3-day history of blurred vision, bilateral facial pins-and-needles, bilateral lower extremity weakness.  Patient reports that he has MS and this feels like his general flareups.  He states that since he started having infusions he has had less flareups however he is due for his next treatment in January.  He denies having any fever, nausea or vomiting.  He denies having any syncopal episodes or lightheadedness.  He denies having chest pain or shortness of air.  He denies abdominal pain, constipation or diarrhea.  He denies lower extremity pain but reports weakness.  He denies urinary symptoms.  We have been asked to admit this patient for further evaluation and treatment of possible MS flareup versus orthostatic hypotension.    Emergency department work-up afebrile.  Tachycardia, blood pressure upon arrival 159/104 sitting, standing 116/92.  Room air 100%.  Labs significant for <0.010.  Glucose 171.  Creatinine 0.99.  Sodium 138.  Potassium 3.6.  Magnesium 2.0.  White blood count 13.3.  Hemoglobin 15.2.  Platelets 274.  UA significant for opiate positive.    EKG Sinus tachycardia. Low voltage, precordial leads      Review of Systems   Constitutional: Negative.   HENT: Negative.    Eyes: Positive for blurred vision.   Cardiovascular: Negative for chest pain, leg swelling and syncope.   Respiratory: Negative for cough and shortness of breath.    Musculoskeletal: Negative.    Gastrointestinal: Negative.    Genitourinary: Negative.    Neurological: Positive  for dizziness and paresthesias. Negative for light-headedness.   Psychiatric/Behavioral: Negative.           Personal History     Past Medical History:   Diagnosis Date   • Foot pain    • Hypertension    • Migraine    • [facility] (multiple sclerosis) ([facility]) 2015       Past Surgical History:   Procedure Laterality Date   • FOOT FRACTURE SURGERY Left 2002   • FOOT FRACTURE SURGERY Left    • LEG SURGERY Right     Fibula plates and screws       Family History: family history is not on file. Otherwise pertinent FHx was reviewed and not pertinent to current issue.    Social History:  reports that he has never smoked. He has never used smokeless tobacco. He reports that he does not drink alcohol and does not use drugs.    Home Medications:  Prior to Admission Medications     Prescriptions Last Dose Informant Patient Reported? Taking?    amLODIPine (NORVASC) 10 MG tablet  Self Yes No    Take 10 mg by mouth Daily.    baclofen (LIORESAL) 20 MG tablet   No No    Take 1 tablet by mouth Every 6 (Six) Hours As Needed for Muscle Spasms.    bethanechol (URECHOLINE) 25 MG tablet  Self Yes No    Take 25 mg by mouth 3 (Three) Times a Day.    cyclobenzaprine (FLEXERIL) 10 MG tablet   No No    Take 1 tablet by mouth 3 (Three) Times a Day.    lisinopril (PRINIVIL,ZESTRIL) 10 MG tablet   Yes No    Take 10 mg by mouth Daily.    meclizine (ANTIVERT) 12.5 MG tablet  Self Yes No    Take 12.5 mg by mouth 3 (Three) Times a Day As Needed for Dizziness.    meloxicam (MOBIC) 15 MG tablet   No No    Take 1 tablet by mouth Daily.    Methylnaltrexone Bromide (Relistor) 150 MG tablet   No No    Take 3 tablets by mouth Daily.    metoprolol succinate XL (Toprol XL) 50 MG 24 hr tablet   No No    Take 1 tablet by mouth Daily.    Morphine (MS CONTIN) 15 MG 12 hr tablet   No No    Take 1 tablet by mouth 3 (Three) Times a Day.    Morphine (MS CONTIN) 15 MG 12 hr tablet   No No    Take 1 tablet by mouth 3 (Three) Times a Day As Needed for Severe Pain .    Morphine  (MSIR) 15 MG tablet   No No    Take 1 tablet by mouth 3 (Three) Times a Day As Needed for Severe Pain .    Morphine (MSIR) 15 MG tablet   No No    Take 1 tablet by mouth 3 (Three) Times a Day As Needed for Severe Pain .    pregabalin (LYRICA) 300 MG capsule   No No    Take 1 capsule by mouth 2 (Two) Times a Day.    rizatriptan (MAXALT) 10 MG tablet  Self Yes No    Take 10 mg by mouth 1 (One) Time As Needed for Migraine. May repeat in 2 hours if needed            Allergies:  No Known Allergies    Objective      Vitals:   Temp:  [98 °F (36.7 °C)] 98 °F (36.7 °C)  Heart Rate:  [100-124] 101  Resp:  [16-18] 16  BP: (116-159)/() 122/86    Physical Exam  Constitutional:       General: He is not in acute distress.     Appearance: He is obese.   HENT:      Head: Normocephalic and atraumatic.   Cardiovascular:      Rate and Rhythm: Regular rhythm. Tachycardia present.      Heart sounds: Normal heart sounds. No murmur heard.  Pulmonary:      Effort: Pulmonary effort is normal. No respiratory distress.   Abdominal:      General: Bowel sounds are normal.      Palpations: Abdomen is soft.      Tenderness: There is no abdominal tenderness.   Musculoskeletal:         General: Normal range of motion.      Cervical back: Normal range of motion.   Skin:     General: Skin is warm and dry.   Neurological:      Mental Status: He is alert.      Comments: Bilateral facial tingling/pins.  Upper chest tingling and pins.  Bilateral lower extremity weakness.  Blurred vision   Psychiatric:         Mood and Affect: Mood normal.            Result Review    Result Review:  I have personally reviewed the results from the time of this admission to 1/2/2023 16:55 EST and agree with these findings:  [x]  Laboratory  [x]  Microbiology  [x]  Radiology  [x]  EKG/Telemetry   []  Cardiology/Vascular   []  Pathology  []  Old records  []  Other:        Assessment & Plan        Active Hospital Problems:  Active Hospital Problems    Diagnosis    •  **Dizziness      Plan:     MS, possible flair   -WBC 13.0  -Yarely Hooker Neurologist at [facility].   -Consult Neurology for evaluation of possible MS flare.  Recommendations steroids and MRI  -Continue home meds  -Infusion due 1/7/23, (patient received Ocrevus on 7/12/22)    Migraines  -Continue home meds     Hypertension   -Current /84  -Patient had positive orthostatics with 30 point drop   -tachycardia, chronic   -ECHO, pending     Bilateral Ankle and Left shoulder pain   --Inspect verified Norco 10/325 mg and Lyrica pain management Ana Rosa Conn MD in [place]   -Continue home meds     DVT prophylaxis:  Medical DVT prophylaxis orders are present.    CODE STATUS:    Level Of Support Discussed With: Patient  Code Status (Patient has no pulse and is not breathing): CPR (Attempt to Resuscitate)  Medical Interventions (Patient has pulse or is breathing): Full Support    Admission Status:  I believe this patient meets observation status.    I discussed the patient's findings and my recommendations with patient.    Signature: Electronically signed by Jacklyn Beaulieu PA-C, 01/02/23, 4:52 PM EST.

## 2023-01-03 ENCOUNTER — APPOINTMENT (OUTPATIENT)
Dept: CARDIOLOGY | Facility: HOSPITAL | Age: 48
End: 2023-01-03
Payer: MEDICARE

## 2023-01-03 PROBLEM — E66.9 OBESITY (BMI 30-39.9): Chronic | Status: ACTIVE | Noted: 2023-01-03

## 2023-01-03 LAB
ANION GAP SERPL CALCULATED.3IONS-SCNC: 10 MMOL/L (ref 5–15)
BASOPHILS # BLD AUTO: 0.1 10*3/MM3 (ref 0–0.2)
BASOPHILS NFR BLD AUTO: 0.7 % (ref 0–1.5)
BH CV ECHO MEAS - ACS: 2.31 CM
BH CV ECHO MEAS - AO MAX PG: 6.3 MMHG
BH CV ECHO MEAS - AO MEAN PG: 3.6 MMHG
BH CV ECHO MEAS - AO ROOT DIAM: 4 CM
BH CV ECHO MEAS - AO V2 MAX: 125.8 CM/SEC
BH CV ECHO MEAS - AO V2 VTI: 20.8 CM
BH CV ECHO MEAS - AVA(I,D): 2.8 CM2
BH CV ECHO MEAS - EDV(CUBED): 103.8 ML
BH CV ECHO MEAS - EDV(MOD-SP4): 79.2 ML
BH CV ECHO MEAS - EF(MOD-SP4): 62.8 %
BH CV ECHO MEAS - ESV(CUBED): 32.4 ML
BH CV ECHO MEAS - ESV(MOD-SP4): 29.5 ML
BH CV ECHO MEAS - FS: 32.2 %
BH CV ECHO MEAS - IVS/LVPW: 1.09 CM
BH CV ECHO MEAS - IVSD: 1.15 CM
BH CV ECHO MEAS - LA DIMENSION: 3.4 CM
BH CV ECHO MEAS - LV DIASTOLIC VOL/BSA (35-75): 34.3 CM2
BH CV ECHO MEAS - LV MASS(C)D: 188 GRAMS
BH CV ECHO MEAS - LV MAX PG: 5.2 MMHG
BH CV ECHO MEAS - LV MEAN PG: 2.7 MMHG
BH CV ECHO MEAS - LV SYSTOLIC VOL/BSA (12-30): 12.8 CM2
BH CV ECHO MEAS - LV V1 MAX: 114.6 CM/SEC
BH CV ECHO MEAS - LV V1 VTI: 18.3 CM
BH CV ECHO MEAS - LVIDD: 4.7 CM
BH CV ECHO MEAS - LVIDS: 3.2 CM
BH CV ECHO MEAS - LVOT AREA: 3.2 CM2
BH CV ECHO MEAS - LVOT DIAM: 2.02 CM
BH CV ECHO MEAS - LVPWD: 1.06 CM
BH CV ECHO MEAS - MV A MAX VEL: 77.5 CM/SEC
BH CV ECHO MEAS - MV DEC SLOPE: 743.4 CM/SEC2
BH CV ECHO MEAS - MV DEC TIME: 0.09 MSEC
BH CV ECHO MEAS - MV E MAX VEL: 69.7 CM/SEC
BH CV ECHO MEAS - MV E/A: 0.9
BH CV ECHO MEAS - MV MAX PG: 3.5 MMHG
BH CV ECHO MEAS - MV MEAN PG: 2.38 MMHG
BH CV ECHO MEAS - MV V2 VTI: 19.2 CM
BH CV ECHO MEAS - MVA(VTI): 3 CM2
BH CV ECHO MEAS - PA ACC TIME: 0.04 SEC
BH CV ECHO MEAS - PA PR(ACCEL): 62.4 MMHG
BH CV ECHO MEAS - PA V2 MAX: 87.8 CM/SEC
BH CV ECHO MEAS - RV MAX PG: 3.9 MMHG
BH CV ECHO MEAS - RV V1 MAX: 98.6 CM/SEC
BH CV ECHO MEAS - RV V1 VTI: 12.1 CM
BH CV ECHO MEAS - RVDD: 3.2 CM
BH CV ECHO MEAS - SI(MOD-SP4): 21.5 ML/M2
BH CV ECHO MEAS - SV(LVOT): 58.3 ML
BH CV ECHO MEAS - SV(MOD-SP4): 49.7 ML
BUN SERPL-MCNC: 14 MG/DL (ref 6–20)
BUN/CREAT SERPL: 16.1 (ref 7–25)
CALCIUM SPEC-SCNC: 8.7 MG/DL (ref 8.6–10.5)
CHLORIDE SERPL-SCNC: 105 MMOL/L (ref 98–107)
CO2 SERPL-SCNC: 24 MMOL/L (ref 22–29)
CREAT SERPL-MCNC: 0.87 MG/DL (ref 0.76–1.27)
DEPRECATED RDW RBC AUTO: 48.1 FL (ref 37–54)
EGFRCR SERPLBLD CKD-EPI 2021: 108.4 ML/MIN/1.73
EOSINOPHIL # BLD AUTO: 0.1 10*3/MM3 (ref 0–0.4)
EOSINOPHIL NFR BLD AUTO: 1.5 % (ref 0.3–6.2)
ERYTHROCYTE [DISTWIDTH] IN BLOOD BY AUTOMATED COUNT: 14.8 % (ref 12.3–15.4)
GLUCOSE BLDC GLUCOMTR-MCNC: 94 MG/DL (ref 70–105)
GLUCOSE BLDC GLUCOMTR-MCNC: 95 MG/DL (ref 70–105)
GLUCOSE BLDC GLUCOMTR-MCNC: 99 MG/DL (ref 70–105)
GLUCOSE SERPL-MCNC: 98 MG/DL (ref 65–99)
HCT VFR BLD AUTO: 43.9 % (ref 37.5–51)
HGB BLD-MCNC: 15.1 G/DL (ref 13–17.7)
LYMPHOCYTES # BLD AUTO: 1.6 10*3/MM3 (ref 0.7–3.1)
LYMPHOCYTES NFR BLD AUTO: 16.4 % (ref 19.6–45.3)
MAGNESIUM SERPL-MCNC: 2.1 MG/DL (ref 1.6–2.6)
MAXIMAL PREDICTED HEART RATE: 175 BPM
MCH RBC QN AUTO: 30.5 PG (ref 26.6–33)
MCHC RBC AUTO-ENTMCNC: 34.4 G/DL (ref 31.5–35.7)
MCV RBC AUTO: 88.6 FL (ref 79–97)
MONOCYTES # BLD AUTO: 0.8 10*3/MM3 (ref 0.1–0.9)
MONOCYTES NFR BLD AUTO: 8.2 % (ref 5–12)
NEUTROPHILS NFR BLD AUTO: 7.1 10*3/MM3 (ref 1.7–7)
NEUTROPHILS NFR BLD AUTO: 73.2 % (ref 42.7–76)
NRBC BLD AUTO-RTO: 0.1 /100 WBC (ref 0–0.2)
PLATELET # BLD AUTO: 242 10*3/MM3 (ref 140–450)
PMV BLD AUTO: 8.6 FL (ref 6–12)
POTASSIUM SERPL-SCNC: 4 MMOL/L (ref 3.5–5.2)
RBC # BLD AUTO: 4.95 10*6/MM3 (ref 4.14–5.8)
SODIUM SERPL-SCNC: 139 MMOL/L (ref 136–145)
STRESS TARGET HR: 149 BPM
WBC NRBC COR # BLD: 9.6 10*3/MM3 (ref 3.4–10.8)

## 2023-01-03 PROCEDURE — 36415 COLL VENOUS BLD VENIPUNCTURE: CPT | Performed by: PHYSICIAN ASSISTANT

## 2023-01-03 PROCEDURE — 93306 TTE W/DOPPLER COMPLETE: CPT

## 2023-01-03 PROCEDURE — 80048 BASIC METABOLIC PNL TOTAL CA: CPT | Performed by: PHYSICIAN ASSISTANT

## 2023-01-03 PROCEDURE — 99214 OFFICE O/P EST MOD 30 MIN: CPT | Performed by: NURSE PRACTITIONER

## 2023-01-03 PROCEDURE — 82962 GLUCOSE BLOOD TEST: CPT

## 2023-01-03 PROCEDURE — 96372 THER/PROPH/DIAG INJ SC/IM: CPT

## 2023-01-03 PROCEDURE — G0378 HOSPITAL OBSERVATION PER HR: HCPCS

## 2023-01-03 PROCEDURE — 97162 PT EVAL MOD COMPLEX 30 MIN: CPT

## 2023-01-03 PROCEDURE — 93306 TTE W/DOPPLER COMPLETE: CPT | Performed by: INTERNAL MEDICINE

## 2023-01-03 PROCEDURE — 25010000002 ENOXAPARIN PER 10 MG: Performed by: PHYSICIAN ASSISTANT

## 2023-01-03 PROCEDURE — 85025 COMPLETE CBC W/AUTO DIFF WBC: CPT | Performed by: PHYSICIAN ASSISTANT

## 2023-01-03 PROCEDURE — 83735 ASSAY OF MAGNESIUM: CPT | Performed by: PHYSICIAN ASSISTANT

## 2023-01-03 RX ORDER — SUMATRIPTAN 50 MG/1
50 TABLET, FILM COATED ORAL ONCE
Status: COMPLETED | OUTPATIENT
Start: 2023-01-03 | End: 2023-01-03

## 2023-01-03 RX ADMIN — SUMATRIPTAN SUCCINATE 50 MG: 50 TABLET ORAL at 18:04

## 2023-01-03 RX ADMIN — Medication 10 ML: at 20:02

## 2023-01-03 RX ADMIN — HYDROCODONE BITARTRATE AND ACETAMINOPHEN 1 TABLET: 10; 325 TABLET ORAL at 04:35

## 2023-01-03 RX ADMIN — BACLOFEN 20 MG: 10 TABLET ORAL at 11:35

## 2023-01-03 RX ADMIN — HYDROCODONE BITARTRATE AND ACETAMINOPHEN 1 TABLET: 10; 325 TABLET ORAL at 23:56

## 2023-01-03 RX ADMIN — PREGABALIN 300 MG: 100 CAPSULE ORAL at 07:53

## 2023-01-03 RX ADMIN — HYDROCODONE BITARTRATE AND ACETAMINOPHEN 1 TABLET: 10; 325 TABLET ORAL at 11:35

## 2023-01-03 RX ADMIN — LISINOPRIL 10 MG: 5 TABLET ORAL at 07:52

## 2023-01-03 RX ADMIN — BACLOFEN 20 MG: 10 TABLET ORAL at 23:56

## 2023-01-03 RX ADMIN — PREGABALIN 300 MG: 100 CAPSULE ORAL at 20:02

## 2023-01-03 RX ADMIN — SENNOSIDES AND DOCUSATE SODIUM 2 TABLET: 50; 8.6 TABLET ORAL at 07:52

## 2023-01-03 RX ADMIN — ENOXAPARIN SODIUM 40 MG: 100 INJECTION SUBCUTANEOUS at 16:31

## 2023-01-03 RX ADMIN — HYDROCODONE BITARTRATE AND ACETAMINOPHEN 1 TABLET: 10; 325 TABLET ORAL at 18:06

## 2023-01-03 RX ADMIN — AMLODIPINE BESYLATE 10 MG: 5 TABLET ORAL at 07:52

## 2023-01-03 RX ADMIN — BACLOFEN 20 MG: 10 TABLET ORAL at 18:04

## 2023-01-03 RX ADMIN — BACLOFEN 20 MG: 10 TABLET ORAL at 04:35

## 2023-01-03 RX ADMIN — Medication 10 ML: at 07:53

## 2023-01-03 NOTE — PLAN OF CARE
Goal Outcome Evaluation:  Plan of Care Reviewed With: patient           Outcome Evaluation: Slept at intervals. No c/o dizziness. PRN med given for c/o migraine HA. Will continue to monitor.

## 2023-01-03 NOTE — CASE MANAGEMENT/SOCIAL WORK
Discharge Planning Assessment  [facility]     Patient Name: Jorge Luis Nuno  MRN: [id number]  Today's Date: 1/3/2023    Admit Date: 1/2/2023    Plan: Return home with mother. Declined OP PT.   Discharge Needs Assessment     Row Name 01/03/23 1521       Living Environment    People in Home parent(s)    Current Living Arrangements home    Primary Care Provided by self    Provides Primary Care For no one    Family Caregiver if Needed parent(s)    Quality of Family Relationships supportive    Able to Return to Prior Arrangements yes       Resource/Environmental Concerns    Resource/Environmental Concerns none    Transportation Concerns none       Transition Planning    Patient/Family Anticipates Transition to home with family    Patient/Family Anticipated Services at Transition none    Transportation Anticipated family or friend will provide       Discharge Needs Assessment    Readmission Within the Last 30 Days no previous admission in last 30 days    Concerns to be Addressed no discharge needs identified    Anticipated Changes Related to Illness none    Equipment Needed After Discharge none               Discharge Plan     Row Name 01/03/23 1522       Plan    Plan Return home with mother. Declined OP PT.    Patient/Family in Agreement with Plan yes    Plan Comments Met with pt in room. Pt lives with mother, and is IADLs with most things. Pt mother assists with some daily needs. PCP and pharmacy verified. Denies trouble affording home medications. Denies need of DME. Declined outpatient PT. Pt mother will provide transportation at d/c.              Continued Care and Services - Admitted Since 1/2/2023           Expected Discharge Date and Time     Expected Discharge Date Expected Discharge Time    Jan 4, 2023          Demographic Summary     Row Name 01/03/23 1521       General Information    Admission Type observation    Arrived From emergency department    Referral Source admission list    Reason for Consult discharge  planning    Preferred Language English               Functional Status     Row Name 01/03/23 1521       Functional Status    Usual Activity Tolerance moderate    Current Activity Tolerance moderate       Functional Status, IADL    Medications independent    Meal Preparation assistive person    Housekeeping assistive person    Laundry assistive person    Shopping assistive person       Mental Status    General Appearance WDL WDL       Mental Status Summary    Recent Changes in Mental Status/Cognitive Functioning no changes                      Radha Ratliff RN

## 2023-01-03 NOTE — PROGRESS NOTES
[facility] Medicine Services   Daily Progress Note    Patient Name: Jorge Luis Nuno  : 1975  MRN: [id number]  Primary Care Physician:  Tyrone Kendrick MD  Date of admission: 2023    Subjective    Admitted in consultation with neurology for weakness and nonspecific neurologic symptoms in the setting of chronic MS and chronic opioid dependence for treatment of pain.    Did okay overnight.  Feeling a little bit better.  Seen by neurology, and they also feel this is not a typical MS flare.  He has a bit of a migraine this morning as he did last evening.  This has been responsive to triptan therapy.  He would like to try that again today.  Hopeful to discharge home tomorrow.  Bedside rounding completed with the nursing staff. No other acute concerns.    Objective      Vitals:   Temp:  [96.8 °F (36 °C)-98.7 °F (37.1 °C)] 98 °F (36.7 °C)  Heart Rate:  [] 89  Resp:  [18] 18  BP: (122-136)/(85-94) 125/90    Physical Exam     GEN: Obese young man, no acute distress  HEENT: NCAT, PERRLA, moist mucous membranes  NECK: Supple, midline trachea  CARD: RRR, no appreciable M/R/G, no peripheral edema  PULM: CTAB, non-distressed  ABD: soft, NTND, normoactive bowel sounds throughout  SKIN: Warm, dry  NEURO: Grossly intact, generalized weakness, otherwise nonfocal exam  PSYCH: Pleasant     Result Review    Result Review:  I have personally reviewed the results from the time of this admission to 1/3/2023 14:23 EST and agree with these findings:  [x]  Laboratory  [x]  Microbiology  [x]  Radiology  [x]  EKG/Telemetry   [x]  Cardiology/Vascular   []  Pathology  [x]  Old records  []  Other:    Assessment & Plan      amLODIPine, 10 mg, Oral, Daily  baclofen, 20 mg, Oral, Q6H  enoxaparin, 40 mg, Subcutaneous, Q24H  insulin lispro, 2-7 Units, Subcutaneous, TID With Meals  lisinopril, 10 mg, Oral, Daily  pregabalin, 300 mg, Oral, Q12H  senna-docusate sodium, 2 tablet, Oral, BID  sodium chloride, 10 mL, Intravenous,  Q12H             Active Hospital Problems:  Active Hospital Problems    Diagnosis    • **Dizziness    • Obesity (BMI 30-39.9)    • Multiple sclerosis (HCC)    • General weakness    • Chronic pain    • Depression    • Hypertension    • Migraine      Plan:   Repeat triptan therapy for migraine treatment.  He has upcoming outpatient pain management follow-up with his new provider and also has an infusion scheduled for later this week for his MS.  I encouraged him to consider going to [facility] here for these subacute issues unless it is a true emergency for continuity purposes from a neurologic standpoint.  He thought this was a great idea to but says sometimes his mother just takes him \"wherever.\"  Physical therapy evaluations do not suggest any acute needs    DVT prophylaxis:  Medical DVT prophylaxis orders are present.    CODE STATUS:    Level Of Support Discussed With: Patient  Code Status (Patient has no pulse and is not breathing): CPR (Attempt to Resuscitate)  Medical Interventions (Patient has pulse or is breathing): Full Support      Disposition:  I expect patient to be discharged tomorrow morning.    Electronically signed by Mata Landers MD, 01/03/23, 14:23 EST.  [facility] Hospitalist Team

## 2023-01-03 NOTE — THERAPY EVALUATION
Patient Name: Jorge Luis Nuno  : 1975    MRN: [id number]                              Today's Date: 1/3/2023       Admit Date: 2023    Visit Dx:     ICD-10-CM ICD-9-CM   1. Dizziness  R42 780.4   2. Orthostatic lightheadedness  R42 780.4   3. Paresthesias  R20.2 782.0     Patient Active Problem List   Diagnosis   • Confusion   • Depression   • Chronic pain   • Migraine   • Hypertension   • COVID-19   • Dizziness   • Orthostasis   • Hallucination   • Multiple sclerosis (HCC)   • General weakness   • Altered mental status, unspecified altered mental status type   • Renal insufficiency   • Acute conjunctivitis of both eyes   • Obesity (BMI 30-39.9)     Past Medical History:   Diagnosis Date   • Foot pain    • Hypertension    • Migraine    • MS (multiple sclerosis) (HCC)    • Obesity (BMI 30-39.9) 1/3/2023     Past Surgical History:   Procedure Laterality Date   • FOOT FRACTURE SURGERY Left    • FOOT FRACTURE SURGERY Left    • LEG SURGERY Right     Fibula plates and screws      General Information     Row Name 23 1148          Physical Therapy Time and Intention    Document Type evaluation  -NS     Mode of Treatment individual therapy;physical therapy  -NS     Row Name 23 1148          General Information    Patient Profile Reviewed yes  -NS     Prior Level of Function independent:  -NS     Existing Precautions/Restrictions fall  -NS     Barriers to Rehab previous functional deficit  -NS     Row Name 23 1148          Living Environment    People in Home parent(s)  -NS     Row Name 23 1148          Home Main Entrance    Number of Stairs, Main Entrance three  -NS     Stair Railings, Main Entrance railings safe and in good condition  -NS     Row Name 23 1148          Stairs Within Home, Primary    Number of Stairs, Within Home, Primary none  -NS     Row Name 23 1148          Cognition    Orientation Status (Cognition) oriented x 4  -NS     Row Name 23 1148           Safety Issues, Functional Mobility    Impairments Affecting Function (Mobility) balance;endurance/activity tolerance;strength  -NS           User Key  (r) = Recorded By, (t) = Taken By, (c) = Cosigned By    Initials Name Provider Type    Dalia Sharpe, YECENIA Physical Therapist               Mobility     Row Name 01/03/23 1149          Bed Mobility    Bed Mobility supine-sit-supine  -NS     Supine-Sit-Supine [place] (Bed Mobility) standby assist  -NS     Assistive Device (Bed Mobility) bed rails;head of bed elevated  -NS     Row Name 01/03/23 1149          Sit-Stand Transfer    Sit-Stand [place] (Transfers) contact guard  -NS     Row Name 01/03/23 1149          Gait/Stairs (Locomotion)    [place] Level (Gait) contact guard  -NS     Distance in Feet (Gait) 50  -NS     Deviations/Abnormal Patterns (Gait) bianca decreased;gait speed decreased;weight shifting decreased  -NS     Bilateral Gait Deviations heel strike decreased  -NS           User Key  (r) = Recorded By, (t) = Taken By, (c) = Cosigned By    Initials Name Provider Type    Dalia Sharpe PT Physical Therapist               Obj/Interventions     Row Name 01/03/23 1149          Range of Motion Comprehensive    General Range of Motion no range of motion deficits identified  -NS     Row Name 01/03/23 1149          Strength Comprehensive (MMT)    Comment, General Manual Muscle Testing (MMT) Assessment BUE/BLE grossly 4-/5  -NS     Row Name 01/03/23 1149          Balance    Balance Assessment sitting static balance;sitting dynamic balance;sit to stand dynamic balance;standing static balance;standing dynamic balance  -NS     Static Sitting Balance independent  -NS     Dynamic Sitting Balance independent  -NS     Position, Sitting Balance unsupported;sitting edge of bed  -NS     Sit to Stand Dynamic Balance contact guard  -NS     Static Standing Balance contact guard  -NS     Dynamic Standing Balance contact guard  -NS      Position/Device Used, Standing Balance unsupported  -NS           User Key  (r) = Recorded By, (t) = Taken By, (c) = Cosigned By    Initials Name Provider Type    Dalia Sharpe PT Physical Therapist               Goals/Plan     Row Name 01/03/23 1259          Bed Mobility Goal 1 (PT)    Activity/Assistive Device (Bed Mobility Goal 1, PT) bed mobility activities, all  -NS     [place] Level/Cues Needed (Bed Mobility Goal 1, PT) independent  -NS     Time Frame (Bed Mobility Goal 1, PT) long term goal (LTG);2 weeks  -NS     Row Name 01/03/23 1259          Transfer Goal 1 (PT)    Activity/Assistive Device (Transfer Goal 1, PT) transfers, all  -NS     [place] Level/Cues Needed (Transfer Goal 1, PT) independent  -NS     Time Frame (Transfer Goal 1, PT) long term goal (LTG);2 weeks  -NS     [facility] Name 01/03/23 1259          Gait Training Goal 1 (PT)    Activity/Assistive Device (Gait Training Goal 1, PT) gait (walking locomotion)  -NS     [place] Level (Gait Training Goal 1, PT) independent  -NS     Distance (Gait Training Goal 1, PT) 150  -NS     Time Frame (Gait Training Goal 1, PT) long term goal (LTG);2 weeks  -NS     [facility] Name 01/03/23 1259          Therapy Assessment/Plan (PT)    Planned Therapy Interventions (PT) balance training;bed mobility training;gait training;home exercise program;neuromuscular re-education;patient/family education;strengthening;stair training;transfer training  -NS           User Key  (r) = Recorded By, (t) = Taken By, (c) = Cosigned By    Initials Name Provider Type    Dalia Sharpe PT Physical Therapist               Clinical Impression     Row Name 01/03/23 1150          Pain    Additional Documentation Pain Scale: FACES Pre/Post-Treatment (Group)  -NS     [facility] Name 01/03/23 1150          Pain Scale: FACES Pre/Post-Treatment    Pain: FACES Scale, Pretreatment 0-->no hurt  -NS     Posttreatment Pain Rating 0-->no hurt  -NS     Row Name 01/03/23 1150          Plan  of Care Review    Plan of Care Reviewed With patient  -NS     Outcome Evaluation Pt is a 48 y/o male with PMHx of hypertension and multiple sclerosis who presents with c/o dizziness, blurred vision, bilateral facial pins and needles, BLE weakness reporting that it feels like an MS flareup and that he is due for an infusion in January. Pt lives with his mother in a single level home with 3 IVETTE and is independent at baseline with rare use of a cane. Pt appears to be functioning slightly below baseline with gross strength deficits. He is positive for \"lightheadedness\" during supine to sit and sit to stand and per chart review has documented orthostatic hypotension. Pt educated on importance of slowing down transitional movements to avoid symptoms of orthostatic hypotension and he verbalized understanding. Pt is ambulating and transferring with CGA demonstrating some gait and balance deviations associated with his weakness. Pt may benefit from outpatient PT at d/c. Will continue to follow and progress as tolerated.  -NS     Row Name 01/03/23 1150          Therapy Assessment/Plan (PT)    Criteria for Skilled Interventions Met (PT) yes;meets criteria;skilled treatment is necessary  -NS     Therapy Frequency (PT) 3 times/wk  -NS     Predicted Duration of Therapy Intervention (PT) until d/c  -NS     Row Name 01/03/23 1150          Vital Signs    Pre Patient Position Supine  -NS     Post Patient Position Supine  -NS     Row Name 01/03/23 1150          Positioning and Restraints    Pre-Treatment Position in bed  -NS     Post Treatment Position bed  -NS     In Bed supine;call light within reach;encouraged to call for assist;exit alarm on  -NS           User Key  (r) = Recorded By, (t) = Taken By, (c) = Cosigned By    Initials Name Provider Type    Dalia Sharpe, PT Physical Therapist               Outcome Measures     Row Name 01/03/23 1259 01/03/23 0801       How much help from another person do you currently need...     Turning from your back to your side while in flat bed without using bedrails? 4  -NS 4  -AT    Moving from lying on back to sitting on the side of a flat bed without bedrails? 4  -NS 4  -AT    Moving to and from a bed to a chair (including a wheelchair)? 3  -NS 4  -AT    Standing up from a chair using your arms (e.g., wheelchair, bedside chair)? 3  -NS 4  -AT    Climbing 3-5 steps with a railing? 3  -NS 3  -AT    To walk in hospital room? 3  -NS 4  -AT    AM-PAC 6 Clicks Score (PT) 20  -NS 23  -AT    Highest level of mobility 6 --> Walked 10 steps or more  -NS 7 --> Walked 25 feet or more  -AT    Row Name 01/03/23 1259          Functional Assessment    Outcome Measure Options AM-PAC 6 Clicks Basic Mobility (PT)  -NS           User Key  (r) = Recorded By, (t) = Taken By, (c) = Cosigned By    Initials Name Provider Type    AT Sedrick, Jonathan, RN Registered Nurse    Dalia Sharpe, YECENIA Physical Therapist                             Physical Therapy Education     Title: PT OT SLP Therapies (In Progress)     Topic: Physical Therapy (In Progress)     Point: Mobility training (Done)     Learning Progress Summary           Patient Acceptance, E, VU by NS at 1/3/2023 1301                   Point: Home exercise program (Not Started)     Learner Progress:  Not documented in this visit.          Point: Body mechanics (Not Started)     Learner Progress:  Not documented in this visit.          Point: Precautions (Not Started)     Learner Progress:  Not documented in this visit.                      User Key     Initials Effective Dates Name Provider Type Discipline    NS 06/30/22 -  Dalia Mobley PT Physical Therapist PT              PT Recommendation and Plan  Planned Therapy Interventions (PT): balance training, bed mobility training, gait training, home exercise program, neuromuscular re-education, patient/family education, strengthening, stair training, transfer training  Plan of Care Reviewed With:  patient  Outcome Evaluation: Pt is a 48 y/o male with PMHx of hypertension and multiple sclerosis who presents with c/o dizziness, blurred vision, bilateral facial pins and needles, BLE weakness reporting that it feels like an MS flareup and that he is due for an infusion in January. Pt lives with his mother in a single level home with 3 IVETTE and is independent at baseline with rare use of a cane. Pt appears to be functioning slightly below baseline with gross strength deficits. He is positive for \"lightheadedness\" during supine to sit and sit to stand and per chart review has documented orthostatic hypotension. Pt educated on importance of slowing down transitional movements to avoid symptoms of orthostatic hypotension and he verbalized understanding. Pt is ambulating and transferring with CGA demonstrating some gait and balance deviations associated with his weakness. Pt may benefit from outpatient PT at d/c. Will continue to follow and progress as tolerated.     Time Calculation:    PT Charges     Row Name 01/03/23 1302             Time Calculation    Start Time 1051  -NS      Stop Time 1102  -NS      Time Calculation (min) 11 min  -NS      PT Received On 01/03/23  -NS      PT - Next Appointment 01/05/23  -NS      PT Goal Re-Cert Due Date 01/17/23  -NS         Time Calculation- PT    Total Timed Code Minutes- PT 0 minute(s)  -NS            User Key  (r) = Recorded By, (t) = Taken By, (c) = Cosigned By    Initials Name Provider Type    Dalia Sharpe PT Physical Therapist              Therapy Charges for Today     Code Description Service Date Service Provider Modifiers Qty    68964022212 HC PT EVAL MOD COMPLEXITY 3 1/3/2023 Dalia Mobley PT GP 1          PT G-Codes  Outcome Measure Options: AM-PAC 6 Clicks Basic Mobility (PT)  AM-PAC 6 Clicks Score (PT): 20  PT Discharge Summary  Anticipated Discharge Disposition (PT): home with outpatient therapy services    Dalia Mobley PT  1/3/2023

## 2023-01-03 NOTE — CONSULTS
Primary Care Provider: Tyrone Kendrick MD     Consult requested by:  SYLVESTER Romero    Reason for Consultation: Neurological evaluation     History taken from: patient chart RN    Chief complaint: tingling/pinpricks in the chest and face bilaterally        SUBJECTIVE:    History of present illness: Background per H&P: Jorge Luis Nuno is a 47 y.o. year old male who presented to [facility] on 1/2/2023 complaining of dizziness.  Patient reports 3-day history of blurred vision, bilateral facial pins-and-needles, bilateral lower extremity weakness.  Patient reports that he has MS and this feels like his general flareups.  He states that since he started having infusions he has had less flareups however he is due for his next treatment in January.  He denies having any fever, nausea or vomiting.  He denies having any syncopal episodes or lightheadedness.  He denies having chest pain or shortness of air.  He denies abdominal pain, constipation or diarrhea.  He denies lower extremity pain but reports weakness.  He denies urinary symptoms.  We have been asked to admit this patient for further evaluation and treatment of possible MS flareup versus orthostatic hypotension.  - Portions of the above HPI were copied from previous encounters and edited as appropriate. PMH as detailed below.     Pt states he developed tingling/pinkpricks in his chest and face about 4 days ago. He has chronic left sided numbness, but feels like it may be a little more numb and weak than usual. He feels a little weak in the right leg as well. He feels like the weakness and numbness is typical of his MS exacerbations, but states he has never had tingling and pinpricks like this. He is noted to be scratching his chest frequently during exam and he states it is because it feels irritated. He denies any new medications, lotions, or other changes. He denies any chemical or plant exposure.     Review of Systems   Constitutional: Negative for  chills, diaphoresis and fatigue.   Eyes: Negative for photophobia and visual disturbance.   Neurological: Positive for weakness and numbness. Negative for dizziness, tremors, seizures, syncope, facial asymmetry, speech difficulty, light-headedness and headaches.          PATIENT HISTORY:  Past Medical History:   Diagnosis Date   • Foot pain    • Hypertension    • Migraine    • MS (multiple sclerosis) ([facility]) 2015   • Obesity (BMI 30-39.9) 1/3/2023   ,   Past Surgical History:   Procedure Laterality Date   • FOOT FRACTURE SURGERY Left 2002   • FOOT FRACTURE SURGERY Left    • LEG SURGERY Right     Fibula plates and screws   , No family history on file.,   Social History     Tobacco Use   • Smoking status: Never   • Smokeless tobacco: Never   Vaping Use   • Vaping Use: Never used   Substance Use Topics   • Alcohol use: Never   • Drug use: Never   ,   Prior to Admission medications    Medication Sig Start Date End Date Taking? Authorizing Provider   amLODIPine (NORVASC) 10 MG tablet Take 10 mg by mouth Daily.   Yes Cyndi Alberto MD   lisinopril (PRINIVIL,ZESTRIL) 10 MG tablet Take 10 mg by mouth Daily.   Yes Cyndi Alberto MD   pregabalin (LYRICA) 300 MG capsule Take 1 capsule by mouth 2 (Two) Times a Day. 8/12/22  Yes Yg Elliott MD   baclofen (LIORESAL) 20 MG tablet Take 1 tablet by mouth Every 6 (Six) Hours As Needed for Muscle Spasms. 8/12/22   Yg Elliott MD   HYDROcodone-acetaminophen (NORCO)  MG per tablet Take 1 tablet by mouth 4 (Four) Times a Day As Needed for Moderate Pain.    Cyndi Alberto MD   meclizine (ANTIVERT) 12.5 MG tablet Take 12.5 mg by mouth 3 (Three) Times a Day As Needed for Dizziness.    Cyndi Alberto MD   rizatriptan (MAXALT) 10 MG tablet Take 10 mg by mouth 1 (One) Time As Needed for Migraine. May repeat in 2 hours if needed    Cyndi Alberto MD    Allergies:  Patient has no known allergies.    Current Facility-Administered Medications    Medication Dose Route Frequency Provider Last Rate Last Admin   • acetaminophen (TYLENOL) tablet 650 mg  650 mg Oral Q4H PRN MERI'Jacklyn Renteria PA-C        Or   • acetaminophen (TYLENOL) 160 MG/5ML solution 650 mg  650 mg Oral Q4H PRN MERI'Jacklyn Renteria PA-C        Or   • acetaminophen (TYLENOL) suppository 650 mg  650 mg Rectal Q4H PRN MERI'Jacklyn Renteria PA-C       • aluminum-magnesium hydroxide-simethicone (MAALOX MAX) 400-400-40 MG/5ML suspension 15 mL  15 mL Oral Q6H PRN MERI'Jacklyn Renteria PA-C       • amLODIPine (NORVASC) tablet 10 mg  10 mg Oral Daily MERI'Jacklyn Renteria PA-C   10 mg at 01/03/23 0752   • baclofen (LIORESAL) tablet 20 mg  20 mg Oral Q6H Mata Landers MD   20 mg at 01/03/23 1135   • sennosides-docusate (PERICOLACE) 8.6-50 MG per tablet 2 tablet  2 tablet Oral BID MERI'Jacklyn Renteria PA-C   2 tablet at 01/03/23 0752    And   • polyethylene glycol (MIRALAX) packet 17 g  17 g Oral Daily PRN Jacklyn Beaulieu PA-C        And   • bisacodyl (DULCOLAX) EC tablet 5 mg  5 mg Oral Daily PRN MERI'Jacklyn Renteria PA-C        And   • bisacodyl (DULCOLAX) suppository 10 mg  10 mg Rectal Daily PRN Jacklyn Beaulieu PA-C       • dextrose (D50W) (25 g/50 mL) IV injection 25 g  25 g Intravenous Q15 Min PRN Jacklyn Beaulieu PA-C       • dextrose (GLUTOSE) oral gel 15 g  15 g Oral Q15 Min PRN Jacklyn Beaulieu PA-C       • Enoxaparin Sodium (LOVENOX) syringe 40 mg  40 mg Subcutaneous Q24H MERI'Jacklyn Renteria PA-C   40 mg at 01/02/23 1800   • glucagon (human recombinant) (GLUCAGEN DIAGNOSTIC) 1 mg in sterile water (preservative free) 1 mL injection  1 mg Intramuscular Q15 Min PRN Jacklyn Beaulieu PA-C       • HYDROcodone-acetaminophen (NORCO)  MG per tablet 1 tablet  1 tablet Oral Q6H PRN Mata Landers MD   1 tablet at 01/03/23 1135   • insulin lispro (ADMELOG) injection 2-7 Units  2-7 Units Subcutaneous TID With Meals Jacklyn Beaulieu PA-C       • lisinopril (PRINIVIL,ZESTRIL) tablet 10 mg  10 mg Oral Daily Jacklyn Beaulieu  KESHIA SHOOK   10 mg at 01/03/23 0752   • Magnesium Sulfate 2 gram infusion - Mg less than or equal to 1.5 mg/dL  2 g Intravenous PRN MERI'Jacklyn Renteria PA-C        Or   • Magnesium Sulfate 1 gram infusion - Mg 1.6-1.9 mg/dL  1 g Intravenous PRN MERI'Jacklyn Renteria PA-C       • meclizine (ANTIVERT) tablet 12.5 mg  12.5 mg Oral TID PRN Jacklyn Beaulieu PA-C       • melatonin tablet 5 mg  5 mg Oral Nightly PRN MERI'Jacklyn Renteria PA-C   5 mg at 01/02/23 2334   • nitroglycerin (NITROSTAT) SL tablet 0.4 mg  0.4 mg Sublingual Q5 Min PRN MERI'Jacklyn Renteria PA-C       • ondansetron (ZOFRAN) tablet 4 mg  4 mg Oral Q6H PRN MERI'Jacklyn Renteria PA-C        Or   • ondansetron (ZOFRAN) injection 4 mg  4 mg Intravenous Q6H PRN MERI'Jacklyn Renteria PA-C       • potassium chloride (K-DUR,KLOR-CON) CR tablet 40 mEq  40 mEq Oral PRN MERI'Jacklyn Renteria PA-C       • potassium chloride (KLOR-CON) packet 40 mEq  40 mEq Oral PRN MERI'Jacklyn Renteria PA-C       • pregabalin (LYRICA) capsule 300 mg  300 mg Oral Q12H Mata Landers MD   300 mg at 01/03/23 0753   • sodium chloride 0.9 % flush 10 mL  10 mL Intravenous PRN Giovanni Keene MD       • sodium chloride 0.9 % flush 10 mL  10 mL Intravenous Q12H O'Jacklyn Renteria PA-C   10 mL at 01/03/23 0753   • sodium chloride 0.9 % flush 10 mL  10 mL Intravenous PRN MERI'Jacklyn Renteria PA-C       • sodium chloride 0.9 % infusion 40 mL  40 mL Intravenous PRN MERI'Jacklyn Renteria PA-C            ________________________________________________________        OBJECTIVE:  Upon today's exam, pt is awake and alert. Oriented x3. Pt observed to scratch his chest and face frequently throughout exam.      Neurologic Exam  PHYSICAL EXAM:    CONSTITUTIONAL: The patient is in no apparent distress, bright awake and alert.      HEENT: There is no tenderness over the temporal arteries bilaterally. Normocephalic, atraumatic. TMJ open symmetrically without tenderness. There is some redness to the cheeks and chin bilaterally without obvious rash,  though exam is somewhat limited by pt's facial hair.     SKIN: pink/red irritation with dry skin across the upper chest bilaterally. Irritation increases with touch. No blisters, bumps, or lesions. No rash or redness in the abdomen, arms, or legs.     NECK: ROM normal, supple    RESPIRATORY: Breathing unlabored, Breath sounds are normal    CARDIAC: Regular rate and rhythm.     EXTREMITIES:  No clubbing, cyanosis or edema.    PSYCHIATRIC: Mood/affect normal, judgement normal, appropriate    NEUROLOGICAL:    Cognition:   Fully oriented.  Fund of knowledge excellent.  Concentration and attention normal.   Language normal with normal comprehension, fluent speech, intact repetition and naming.   Short and long term memory appears intact    Cranial nerves;    II - pupils bilaterally equal reacting to light,  No new Visual field deficits;  Fundoscopic exam- Not able to be done, non-dilated exam  III,IV,VI: EOMI with no diplopia  V: decreased sensation in the face on the left, tingling reported with touch on the right side of the face  VII: No facial asymmetry,  VIII: No New hearing abnormality  IX, X, XI: normal gag and shoulder shrug;  XII: tongue is in the midline.    Sensory:  Decreased sensation in the left arm and leg    Motor: Strength 5/5 bilaterally upper and lower extremities. No involuntary movements present. Normal tone and bulk.  Deep tendon reflexes: 2/4 and symmetrical in biceps, brachioradialis, triceps, bilateral 2/4 knees and ankles. Both plantars are flexor.    Cerebellar: Finger to nose and mirror movements normal bilaterally.    Gait and balance: deferred    ________________________________________________________   RESULTS REVIEW:    VITAL SIGNS:   Temp:  [96.8 °F (36 °C)-98.7 °F (37.1 °C)] 98 °F (36.7 °C)  Heart Rate:  [] 89  Resp:  [16-18] 18  BP: (120-136)/(85-94) 125/90     LABS:      Lab 01/03/23  0801 01/02/23  1322   WBC 9.60 13.30*   HEMOGLOBIN 15.1 15.2   HEMATOCRIT 43.9 46.3   PLATELETS  242 274   NEUTROS ABS 7.10* 11.10*   LYMPHS ABS 1.60 1.50   MONOS ABS 0.80 0.60   EOS ABS 0.10 0.10   MCV 88.6 90.6         Lab 01/03/23  0801 01/03/23  0431 01/02/23  1322   SODIUM 139  --  138   POTASSIUM 4.0  --  3.6   CHLORIDE 105  --  100   CO2 24.0  --  24.0   ANION GAP 10.0  --  14.0   BUN 14  --  13   CREATININE 0.87  --  0.99   EGFR 108.4  --  95.7   GLUCOSE 98  --  171*   CALCIUM 8.7  --  9.2   MAGNESIUM  --  2.1 2.0   HEMOGLOBIN A1C  --   --  5.0             Lab 01/02/23  1322   TROPONIN T <0.010                 UA    Urinalysis 4/30/22 9/22/22   Specific Blanch, UA 1.021 1.008   Ketones, UA Trace (A) Negative   Blood, UA Negative Negative   Leukocytes, UA Negative Negative   Nitrite, UA Negative Negative   (A) Abnormal value              Lab Results   Component Value Date    TSH 1.050 06/29/2021    HGBA1C 5.0 01/02/2023    [id number] 06/26/2021       IMAGING STUDIES:  No radiology results for the last day    I reviewed the patient's new clinical results.    ________________________________________________________     PROBLEM LIST:    Dizziness    Depression    Chronic pain    Migraine    Hypertension    Multiple sclerosis (HCC)    General weakness    Obesity (BMI 30-39.9)            ASSESSMENT/PLAN:  1. Tingling/irrtation in the face and chest x4 days. It is unclear if this is an MS exacerbation or if pt has an underlying skin irritation. The skin is notably irritated as pt has been scratching it frequently, though no clear rash or lesions. The area is not numb. He denies any new medications or exposure to chemicals/irritants. He does feels his chronic left sided numbness and BLE weakness is mildly worse than normal, but is not interfering with his function.   - Discussed options with pt including possible steroid treatment. Considering pt's symptoms are mild and this is not a clear MS exacerbation, recommend to defer steroids for now.   - Pt is closely followed by Dr. Hooker at [facility]. Recommend pt  call their office to discuss plan   - He is currently on Ocrevus outpt and has an infusion scheduled for next week   - Pt is already taking Lyrica 300mg BID   - Nothing more to add from an inpatient neurology standpoint at this time.      Will sign off. Please call with questions or concerns.       I discussed the patient's findings and my recommendations with patient, nursing staff and consulting provider    KATHY Ford  01/03/23  13:39 EST

## 2023-01-03 NOTE — PLAN OF CARE
Goal Outcome Evaluation:  Plan of Care Reviewed With: patient           Outcome Evaluation: Pt is a 48 y/o male with PMHx of hypertension and multiple sclerosis who presents with c/o dizziness, blurred vision, bilateral facial pins and needles, BLE weakness reporting that it feels like an MS flareup and that he is due for an infusion in January. Pt lives with his mother in a single level home with 3 IVETTE and is independent at baseline with rare use of a cane. Pt appears to be functioning slightly below baseline with gross strength deficits. He is positive for \"lightheadedness\" during supine to sit and sit to stand and per chart review has documented orthostatic hypotension. Pt educated on importance of slowing down transitional movements to avoid symptoms of orthostatic hypotension and he verbalized understanding. Pt is ambulating and transferring with CGA demonstrating some gait and balance deviations associated with his weakness. Pt may benefit from outpatient PT at d/c. Will continue to follow and progress as tolerated.

## 2023-01-04 VITALS
SYSTOLIC BLOOD PRESSURE: 128 MMHG | DIASTOLIC BLOOD PRESSURE: 86 MMHG | WEIGHT: 261.25 LBS | HEIGHT: 69 IN | HEART RATE: 99 BPM | RESPIRATION RATE: 20 BRPM | OXYGEN SATURATION: 96 % | BODY MASS INDEX: 38.69 KG/M2 | TEMPERATURE: 98.7 F

## 2023-01-04 LAB
GLUCOSE BLDC GLUCOMTR-MCNC: 169 MG/DL (ref 70–105)
MAGNESIUM SERPL-MCNC: 2.2 MG/DL (ref 1.6–2.6)
POTASSIUM SERPL-SCNC: 3.9 MMOL/L (ref 3.5–5.2)
WHOLE BLOOD HOLD SPECIMEN: NORMAL

## 2023-01-04 PROCEDURE — 36415 COLL VENOUS BLD VENIPUNCTURE: CPT | Performed by: PHYSICIAN ASSISTANT

## 2023-01-04 PROCEDURE — 25010000002 METHYLPREDNISOLONE PER 125 MG: Performed by: NURSE PRACTITIONER

## 2023-01-04 PROCEDURE — 82962 GLUCOSE BLOOD TEST: CPT

## 2023-01-04 PROCEDURE — 84132 ASSAY OF SERUM POTASSIUM: CPT | Performed by: PHYSICIAN ASSISTANT

## 2023-01-04 PROCEDURE — G0378 HOSPITAL OBSERVATION PER HR: HCPCS

## 2023-01-04 PROCEDURE — 96374 THER/PROPH/DIAG INJ IV PUSH: CPT

## 2023-01-04 PROCEDURE — 63710000001 INSULIN LISPRO (HUMAN) PER 5 UNITS: Performed by: PHYSICIAN ASSISTANT

## 2023-01-04 PROCEDURE — 83735 ASSAY OF MAGNESIUM: CPT | Performed by: PHYSICIAN ASSISTANT

## 2023-01-04 RX ORDER — METHYLPREDNISOLONE SODIUM SUCCINATE 125 MG/2ML
125 INJECTION, POWDER, LYOPHILIZED, FOR SOLUTION INTRAMUSCULAR; INTRAVENOUS ONCE
Status: COMPLETED | OUTPATIENT
Start: 2023-01-04 | End: 2023-01-04

## 2023-01-04 RX ADMIN — BACLOFEN 20 MG: 10 TABLET ORAL at 06:03

## 2023-01-04 RX ADMIN — PREGABALIN 300 MG: 100 CAPSULE ORAL at 09:45

## 2023-01-04 RX ADMIN — LISINOPRIL 10 MG: 5 TABLET ORAL at 09:45

## 2023-01-04 RX ADMIN — HYDROCODONE BITARTRATE AND ACETAMINOPHEN 1 TABLET: 10; 325 TABLET ORAL at 06:03

## 2023-01-04 RX ADMIN — METHYLPREDNISOLONE SODIUM SUCCINATE 125 MG: 125 INJECTION, POWDER, FOR SOLUTION INTRAMUSCULAR; INTRAVENOUS at 04:11

## 2023-01-04 RX ADMIN — INSULIN LISPRO 2 UNITS: 100 INJECTION, SOLUTION INTRAVENOUS; SUBCUTANEOUS at 08:00

## 2023-01-04 RX ADMIN — AMLODIPINE BESYLATE 10 MG: 5 TABLET ORAL at 09:45

## 2023-01-04 NOTE — PLAN OF CARE
Problem: Adult Inpatient Plan of Care  Goal: Plan of Care Review  Outcome: Ongoing, Progressing   Goal Outcome Evaluation:               Pt awake off & on throughout shift, c/o nerve pain, treated per MAR with minimal relief noted. Pt up ad nathan, VSS. Pt educated on current plan of care, verbalized understanding

## 2023-01-04 NOTE — DISCHARGE SUMMARY
[facility] Medicine Services  Discharge Summary    Patient Name: Jorge Luis Nuno  : 1975  MRN: [id number]    Date of Admission: 2023  Discharge Diagnosis: Same as below  Date of Discharge:  2023  Primary Care Physician: Tyrone Kendrick MD    Presenting Problem:   Dizziness [R42]  Paresthesias [R20.2]  Orthostatic lightheadedness [R42]    Active and Resolved Hospital Problems:  Active Hospital Problems    Diagnosis POA   • **Dizziness [R42] Yes   • Obesity (BMI 30-39.9) [E66.9] Yes   • Multiple sclerosis (HCC) [G35] Yes   • General weakness [R53.1] Yes   • Chronic pain [G89.29] Yes   • Depression [F32.A] Yes   • Hypertension [I10] Yes   • Migraine [G43.909] Yes      Resolved Hospital Problems   No resolved problems to display.       Hospital Course     HPI:  Jorge Luis Nuno is a 47 y.o. male with a past medical history to include hypertension and multiple sclerosis who presented to [facility] on 2023 complaining of dizziness.  Patient reports 3-day history of blurred vision, bilateral facial pins-and-needles, bilateral lower extremity weakness.  Patient reports that he has MS and this feels like his general flareups.  He states that since he started having infusions he has had less flareups however he is due for his next treatment in January.  He denies having any fever, nausea or vomiting.  He denies having any syncopal episodes or lightheadedness.  He denies having chest pain or shortness of air.  He denies abdominal pain, constipation or diarrhea.  He denies lower extremity pain but reports weakness.  He denies urinary symptoms.  We have been asked to admit this patient for further evaluation and treatment of possible MS flareup versus orthostatic hypotension.     Emergency department work-up afebrile.  Tachycardia, blood pressure upon arrival 159/104 sitting, standing 116/92.  Room air 100%.  Labs significant for <0.010.  Glucose 171.  Creatinine 0.99.  Sodium 138.   Potassium 3.6.  Magnesium 2.0.  White blood count 13.3.  Hemoglobin 15.2.  Platelets 274.  UA significant for opiate positive.     EKG Sinus tachycardia. Low voltage, precordial leads    Hospital course:  He was admitted in consultation with neurology for further evaluation and treatment.  A transthoracic echocardiogram was completed with findings as noted.  There was no evidence of acute infection.  Neither neurology nor I felt like this was a typical MS flare.  He had a bit of a migraine that was responsive to triptan therapy.  He was evaluated by physical therapy.  There was no indication for any long-term placement, as this is a longstanding ongoing problem.  I think some of his symptoms are related to polypharmacy.  He was fired by his pain management clinic for not following up on pill counts.  He did start with a new pain management provider last month, and his urine drug screen was appropriately positive for opiates.  He also has an upcoming infusion for his MS later this week.  I encouraged him to consider going to [facility] for these subacute issues unless it is a true emergency for continuity purposes from a neurologic standpoint.  He thought this was a great idea to but says sometimes his mother just takes him \"wherever.\"  He told me to go ahead and just let him discharge from the hospital so he could go to the emergency room at [facility] and get taken care of.    Day of Discharge     Vital Signs:  Temp:  [98.6 °F (37 °C)-99.4 °F (37.4 °C)] 98.6 °F (37 °C)  Heart Rate:  [79-92] 79  Resp:  [18-20] 20  BP: (109-125)/(73-90) 109/73    Physical Exam:  GEN: Obese young man, no acute distress  HEENT: NCAT, PERRLA, moist mucous membranes  NECK: Supple, midline trachea  CARD: RRR, no appreciable M/R/G, no peripheral edema  PULM: CTAB, non-distressed  ABD: soft, NTND, normoactive bowel sounds throughout  SKIN: Warm, dry  NEURO: Grossly intact, generalized weakness, otherwise nonfocal exam  PSYCH:  Aggravated today because he feels like he did not accomplish anything during this hospital stay           Pertinent  and/or Most Recent Results     LAB RESULTS:      Lab 01/03/23  0801 01/02/23  1322   WBC 9.60 13.30*   HEMOGLOBIN 15.1 15.2   HEMATOCRIT 43.9 46.3   PLATELETS 242 274   NEUTROS ABS 7.10* 11.10*   LYMPHS ABS 1.60 1.50   MONOS ABS 0.80 0.60   EOS ABS 0.10 0.10   MCV 88.6 90.6         Lab 01/04/23  0457 01/03/23  0801 01/03/23  0431 01/02/23  1322   SODIUM  --  139  --  138   POTASSIUM 3.9 4.0  --  3.6   CHLORIDE  --  105  --  100   CO2  --  24.0  --  24.0   ANION GAP  --  10.0  --  14.0   BUN  --  14  --  13   CREATININE  --  0.87  --  0.99   EGFR  --  108.4  --  95.7   GLUCOSE  --  98  --  171*   CALCIUM  --  8.7  --  9.2   MAGNESIUM 2.2  --  2.1 2.0   HEMOGLOBIN A1C  --   --   --  5.0             Lab 01/02/23  1322   TROPONIN T <0.010                 Brief Urine Lab Results  (Last result in the past 365 days)      Color   Clarity   Blood   Leuk Est   Nitrite   Protein   CREAT   Urine HCG        09/22/22 2124 Yellow   Clear   Negative   Negative   Negative   Negative               Microbiology Results (last 10 days)     ** No results found for the last 240 hours. **          Results for orders placed during the hospital encounter of 01/02/23    Adult Transthoracic Echo Complete w/ Color, Spectral and Contrast if Necessary Per Protocol    Interpretation Summary  •  Left ventricular ejection fraction appears to be 61 - 65%.  •  Left ventricular wall thickness is consistent with borderline concentric hypertrophy.    Transthoracic echocardiography reveals normal LV systolic function with EF of 63%.  Chamber sizes are normal.  Borderline LVH noted.  No effusion.    Electronically signed by Arvin Hinton MD, 01/03/23, 7:04 PM EST.        Consults:   Consults     Date and Time Order Name Status Description    1/2/2023  4:54 PM Inpatient Neurology Consult General Completed     1/2/2023  2:35 PM  Hospitalist (on-call MD unless specified)              Discharge Details        Discharge Medications      Continue These Medications      Instructions Start Date   amLODIPine 10 MG tablet  Commonly known as: NORVASC   10 mg, Oral, Daily      baclofen 20 MG tablet  Commonly known as: LIORESAL   20 mg, Oral, Every 6 Hours PRN      HYDROcodone-acetaminophen  MG per tablet  Commonly known as: NORCO   1 tablet, Oral, 4 Times Daily PRN      lisinopril 10 MG tablet  Commonly known as: PRINIVIL,ZESTRIL   10 mg, Oral, Daily      meclizine 12.5 MG tablet  Commonly known as: ANTIVERT   12.5 mg, Oral, 3 Times Daily PRN      pregabalin 300 MG capsule  Commonly known as: LYRICA   300 mg, Oral, 2 Times Daily      rizatriptan 10 MG tablet  Commonly known as: MAXALT   10 mg, Oral, Once As Needed, May repeat in 2 hours if needed              No Known Allergies      Discharge Disposition:   Home or Self Care    Diet:  Hospital:  Diet Order   Procedures   • Diet: Cardiac Diets, Diabetic Diets; Healthy Heart (2-3 Na+); Consistent Carbohydrate; Texture: Regular Texture (IDDSI 7); Fluid Consistency: Thin (IDDSI 0)         Discharge Activity:   Activity Instructions     Activity as Tolerated              CODE STATUS:  Code Status and Medical Interventions:   Ordered at: 01/02/23 9931     Level Of Support Discussed With:    Patient     Code Status (Patient has no pulse and is not breathing):    CPR (Attempt to Resuscitate)     Medical Interventions (Patient has pulse or is breathing):    Full Support         No future appointments.    Additional Instructions for the Follow-ups that You Need to Schedule     Call MD With Problems / Concerns   As directed      Instructions: Neurology    Order Comments: Instructions: Neurology          Discharge Follow-up with Specialty: Neurology follow up as previously scheduled for this week. Pain managment follow up as previously scheduled.   As directed      Specialty: Neurology follow up as  previously scheduled for this week. Pain managment follow up as previously scheduled.             Time spent on Discharge including face to face service:  25 minutes    Signature: Electronically signed by Mata Landers MD, 01/04/23, 08:00 EST.  [facility] Hospitalist Team

## 2023-01-05 LAB — QT INTERVAL: 311 MS

## 2023-01-05 NOTE — CASE MANAGEMENT/SOCIAL WORK
Case Management Discharge Note                Selected Continued Care - Discharged on 1/4/2023 Admission date: 1/2/2023 - Discharge disposition: Home or Self Care            Transportation Services  Private: Car    Final Discharge Disposition Code: 01 - home or self-care